# Patient Record
Sex: MALE | Race: WHITE | NOT HISPANIC OR LATINO | Employment: OTHER | URBAN - METROPOLITAN AREA
[De-identification: names, ages, dates, MRNs, and addresses within clinical notes are randomized per-mention and may not be internally consistent; named-entity substitution may affect disease eponyms.]

---

## 2017-02-06 ENCOUNTER — APPOINTMENT (OUTPATIENT)
Dept: RADIOLOGY | Facility: IMAGING CENTER | Age: 62
End: 2017-02-06
Attending: FAMILY MEDICINE
Payer: COMMERCIAL

## 2017-02-06 ENCOUNTER — OFFICE VISIT (OUTPATIENT)
Dept: URGENT CARE | Facility: PHYSICIAN GROUP | Age: 62
End: 2017-02-06
Payer: COMMERCIAL

## 2017-02-06 VITALS
HEART RATE: 114 BPM | TEMPERATURE: 98.4 F | WEIGHT: 315 LBS | OXYGEN SATURATION: 95 % | DIASTOLIC BLOOD PRESSURE: 76 MMHG | RESPIRATION RATE: 18 BRPM | HEIGHT: 71 IN | SYSTOLIC BLOOD PRESSURE: 128 MMHG | BODY MASS INDEX: 44.1 KG/M2

## 2017-02-06 DIAGNOSIS — J40 BRONCHITIS: ICD-10-CM

## 2017-02-06 DIAGNOSIS — R05.9 COUGH: ICD-10-CM

## 2017-02-06 DIAGNOSIS — R06.2 WHEEZE: ICD-10-CM

## 2017-02-06 DIAGNOSIS — J06.9 ACUTE URI: ICD-10-CM

## 2017-02-06 PROCEDURE — 99203 OFFICE O/P NEW LOW 30 MIN: CPT | Mod: 25 | Performed by: FAMILY MEDICINE

## 2017-02-06 PROCEDURE — 71020 DX-CHEST-2 VIEWS: CPT | Mod: 26 | Performed by: FAMILY MEDICINE

## 2017-02-06 PROCEDURE — 94760 N-INVAS EAR/PLS OXIMETRY 1: CPT | Performed by: FAMILY MEDICINE

## 2017-02-06 RX ORDER — DOXYCYCLINE HYCLATE 100 MG
100 TABLET ORAL 2 TIMES DAILY
Qty: 20 TAB | Refills: 0 | Status: SHIPPED | OUTPATIENT
Start: 2017-02-06 | End: 2017-03-16

## 2017-02-06 RX ORDER — PROMETHAZINE HYDROCHLORIDE AND CODEINE PHOSPHATE 6.25; 1 MG/5ML; MG/5ML
5 SYRUP ORAL 4 TIMES DAILY PRN
Qty: 120 ML | Refills: 0 | Status: SHIPPED | OUTPATIENT
Start: 2017-02-06 | End: 2017-03-16

## 2017-02-06 RX ORDER — ALBUTEROL SULFATE 90 UG/1
2 AEROSOL, METERED RESPIRATORY (INHALATION) EVERY 4 HOURS PRN
Qty: 1 INHALER | Refills: 0 | Status: SHIPPED | OUTPATIENT
Start: 2017-02-06 | End: 2019-06-06

## 2017-02-06 ASSESSMENT — ENCOUNTER SYMPTOMS
EYE REDNESS: 0
HEADACHES: 0
COUGH: 1
EYE DISCHARGE: 0
WEIGHT LOSS: 0
WEAKNESS: 1
MYALGIAS: 1

## 2017-02-06 NOTE — PROGRESS NOTES
"Subjective:      Darci Davis is a 61 y.o. male who presents with Cough            Cough  This is a new problem. Episode onset: 1 week waxing and waning productive cough without blood in sputum. Initial fever resolved. +SOB and \"gurgling\". No PMH asthma/pneumonia. +nasal congestion. +ST. OTC mucinex with min improvement.  Associated symptoms include myalgias. Pertinent negatives include no eye redness, headaches, rash or weight loss.   Sx's worse with exertion. No other aggravating or alleviating factors.      Review of Systems   Constitutional: Positive for malaise/fatigue. Negative for weight loss.   Eyes: Negative for discharge and redness.   Respiratory: Positive for cough.    Cardiovascular: Negative for leg swelling.   Musculoskeletal: Positive for myalgias. Negative for joint pain.   Skin: Negative for itching and rash.   Neurological: Positive for weakness. Negative for headaches.     .  Medications, Allergies, and current problem list reviewed today in Epic       Objective:     /76 mmHg  Pulse 114  Temp(Src) 36.9 °C (98.4 °F)  Resp 18  Ht 1.803 m (5' 10.98\")  Wt 147.419 kg (325 lb)  BMI 45.35 kg/m2  SpO2 95%     Physical Exam   Constitutional: He appears well-developed and well-nourished. No distress.   HENT:   Head: Normocephalic and atraumatic.   Nasal congestion  Pharynx red without exudate     Eyes: Conjunctivae are normal.   Neck: Normal range of motion. Neck supple.   Cardiovascular: Regular rhythm and normal heart sounds.    rapid   Pulmonary/Chest: Effort normal. He has wheezes (and rhonchi scattered). He has no rales.   Neurological:   Speech is clear. Patient is appropriate and cooperative.     Skin: Skin is warm and dry. No rash noted.               Assessment/Plan:     Cell: [phone]  CXR: no acute cardiopulmonary process by my read, radiology pending.    1. Acute URI     2. Bronchitis  doxycycline (VIBRAMYCIN) 100 MG Tab   3. Wheeze  DX-CHEST-2 VIEWS    albuterol 108 " (90 BASE) MCG/ACT Aero Soln inhalation aerosol   4. Cough  DX-CHEST-2 VIEWS    promethazine-codeine (PHENERGAN-CODEINE) 6.25-10 MG/5ML Syrup      Differential diagnosis, natural history, supportive care, and indications for immediate follow-up discussed at length.

## 2017-02-06 NOTE — Clinical Note
February 6, 2017         Patient: Darci Davis   YOB: 1955   Date of Visit: 2/6/2017           To Whom it May Concern:    Darci Davis was seen in my clinic on 2/6/2017. Please excuse 2/6 and 2/7/2017.       Sincerely,           Curt Akbar M.D.  Electronically Signed

## 2017-03-16 ENCOUNTER — OFFICE VISIT (OUTPATIENT)
Dept: MEDICAL GROUP | Facility: MEDICAL CENTER | Age: 62
End: 2017-03-16
Payer: COMMERCIAL

## 2017-03-16 VITALS
HEIGHT: 71 IN | BODY MASS INDEX: 44.1 KG/M2 | TEMPERATURE: 98.7 F | RESPIRATION RATE: 16 BRPM | OXYGEN SATURATION: 97 % | HEART RATE: 76 BPM | DIASTOLIC BLOOD PRESSURE: 82 MMHG | WEIGHT: 315 LBS | SYSTOLIC BLOOD PRESSURE: 128 MMHG

## 2017-03-16 DIAGNOSIS — E66.01 MORBID OBESITY, UNSPECIFIED OBESITY TYPE (HCC): Chronic | ICD-10-CM

## 2017-03-16 DIAGNOSIS — E55.9 HYPOVITAMINOSIS D: ICD-10-CM

## 2017-03-16 DIAGNOSIS — Z12.5 PROSTATE CANCER SCREENING: ICD-10-CM

## 2017-03-16 DIAGNOSIS — E78.5 DYSLIPIDEMIA: Chronic | ICD-10-CM

## 2017-03-16 DIAGNOSIS — G47.00 INSOMNIA, UNSPECIFIED TYPE: ICD-10-CM

## 2017-03-16 DIAGNOSIS — R73.01 IMPAIRED FASTING GLUCOSE: Chronic | ICD-10-CM

## 2017-03-16 DIAGNOSIS — Z00.00 PREVENTATIVE HEALTH CARE: ICD-10-CM

## 2017-03-16 DIAGNOSIS — Z12.11 COLON CANCER SCREENING: ICD-10-CM

## 2017-03-16 DIAGNOSIS — I10 ESSENTIAL HYPERTENSION: Chronic | ICD-10-CM

## 2017-03-16 PROCEDURE — 99213 OFFICE O/P EST LOW 20 MIN: CPT | Performed by: INTERNAL MEDICINE

## 2017-03-16 RX ORDER — TRAZODONE HYDROCHLORIDE 50 MG/1
50 TABLET ORAL NIGHTLY PRN
Qty: 30 TAB | Refills: 4 | Status: SHIPPED | OUTPATIENT
Start: 2017-03-16 | End: 2019-06-06

## 2017-03-16 NOTE — MR AVS SNAPSHOT
"        Darci Davis   3/16/2017 11:40 AM   Office Visit   MRN: [id number]    Department:  [facility]   Dept Phone:  [phone]    Description:  Male : 1955   Provider:  Phil Kohler M.D.           Allergies as of 3/16/2017     Allergen Noted Reactions    Ace Inhibitors 2009       Latex 2016   Rash    .    Pcn [Penicillins] 2010   Rash    .    Zocor [Simvastatin] 2010         You were diagnosed with     Morbid obesity, unspecified obesity type (CMS-HCC)   [4583520]       Essential hypertension   [5714797]       Dyslipidemia   [821001]       Impaired fasting glucose   [790.21.ICD-9-CM]       Prostate cancer screening   [445888]       Hypovitaminosis D   [703110]       Preventative health care   [984604]       Insomnia, unspecified type   [6651964]       Colon cancer screening   [090523]         Vital Signs     Blood Pressure Pulse Temperature Respirations Height Weight    128/82 mmHg 76 37.1 °C (98.7 °F) 16 1.803 m (5' 10.98\") 144.244 kg (318 lb)    Body Mass Index Oxygen Saturation Smoking Status             44.37 kg/m2 97% Former Smoker         Basic Information     Date Of Birth Sex Race Ethnicity Preferred Language    1955 Male White Non- English      Problem List              ICD-10-CM Priority Class Noted - Resolved    Hypertension (Chronic) I10   2009 - Present    Morbid obesity (CMS-HCC) (Chronic) E66.01   2009 - Present    Status post total left knee replacement Z96.652   2009 - Present    Preventative health care (Chronic) Z00.00   2009 - Present    Dyslipidemia (Chronic) E78.5   2009 - Present    Low back pain M54.5   12/10/2012 - Present    Steatosis of liver K76.0   2013 - Present    Renal cyst (Chronic) N28.1   2013 - Present    Impaired fasting glucose (Chronic) R73.01   10/10/2014 - Present    BPH (benign prostatic hypertrophy) (Chronic) N40.0   2015 - Present    Hypogonadism male (Chronic) E29.1   " 1/6/2016 - Present    Cervical pain M54.2   2/20/2016 - Present      Health Maintenance        Date Due Completion Dates    IMM INFLUENZA (1) 9/1/2016 12/29/2015, 10/9/2014, 11/26/2013, 12/6/2012, 10/14/2011    COLONOSCOPY 12/19/2022 12/19/2012 (Declined)    Override on 12/19/2012: Patient Declined (pt did not contact [facility])    IMM DTaP/Tdap/Td Vaccine (2 - Td) 5/28/2025 5/28/2015            Current Immunizations     Influenza TIV (IM) 11/26/2013, 12/6/2012, 10/14/2011    Influenza Vaccine Quad Inj (Pf) 12/29/2015  5:10 PM, 10/9/2014    SHINGLES VACCINE 10/9/2014    Tdap Vaccine 5/28/2015    Tetanus Vaccine 7/30/2010  2:40 PM      Below and/or attached are the medications your provider expects you to take. Review all of your home medications and newly ordered medications with your provider and/or pharmacist. Follow medication instructions as directed by your provider and/or pharmacist. Please keep your medication list with you and share with your provider. Update the information when medications are discontinued, doses are changed, or new medications (including over-the-counter products) are added; and carry medication information at all times in the event of emergency situations     Allergies:  ACE INHIBITORS - (reactions not documented)     LATEX - Rash     PCN - Rash     ZOCOR - (reactions not documented)               Medications  Valid as of: March 16, 2017 - 12:02 PM    Generic Name Brand Name Tablet Size Instructions for use    Albuterol Sulfate (Aero Soln) albuterol 108 (90 BASE) MCG/ACT Inhale 2 Puffs by mouth every four hours as needed for Shortness of Breath.        Cholecalciferol (Cap) Vitamin D 2000 UNITS Take  by mouth every day.        Naproxen (Tab) NAPROSYN 500 MG Take 1 Tab by mouth 2 times a day as needed (pain).        Pravastatin Sodium (Tab) PRAVACHOL 20 MG Take 1 Tab by mouth every day.        TraZODone HCl (Tab) DESYREL 50 MG Take 1 Tab by mouth at bedtime as needed for Sleep.         Triamcinolone Acetonide (Cream) KENALOG 0.1 % Apply affected area bid prn itching        Valsartan-Hydrochlorothiazide (Tab) DIOVAN--25 MG Take 1 Tab by mouth every day.        .                 Medicines prescribed today were sent to:     [facility] [address] [place] [address] [place], [place] - [address] AT [facility] OF [facility] & [place]    [address] [place] [address]    Phone: [phone] Fax: [phone]    Open 24 Hours?: No      Medication refill instructions:       If your prescription bottle indicates you have medication refills left, it is not necessary to call your provider’s office. Please contact your pharmacy and they will refill your medication.    If your prescription bottle indicates you do not have any refills left, you may request refills at any time through one of the following ways: The online [employer]Link system (except Urgent Care), by calling your provider’s office, or by asking your pharmacy to contact your provider’s office with a refill request. Medication refills are processed only during regular business hours and may not be available until the next business day. Your provider may request additional information or to have a follow-up visit with you prior to refilling your medication.   *Please Note: Medication refills are assigned a new Rx number when refilled electronically. Your pharmacy may indicate that no refills were authorized even though a new prescription for the same medication is available at the pharmacy. Please request the medicine by name with the pharmacy before contacting your provider for a refill.        Your To Do List     Future Labs/Procedures Complete By Expires    CBC WITH DIFFERENTIAL  As directed 3/17/2018    COMP METABOLIC PANEL  As directed 3/17/2018    LIPID PROFILE  As directed 3/17/2018    MICROALBUMIN CREAT RATIO URINE  As directed 3/17/2018    PROSTATE SPECIFIC AG SCREENING  As directed 3/17/2018    TSH  As directed 3/17/2018    URINALYSIS,CULTURE IF INDICATED   As directed 3/17/2018    VITAMIN D,25 HYDROXY  As directed 3/17/2018      Referral     A referral request has been sent to our patient care coordination department. Please allow 3-5 business days for us to process this request and contact you either by phone or mail. If you do not hear from us by the 5th business day, please call us at [phone].        Other Notes About Your Plan     Need follow up MRI kidney ordered renal cyst                 [employer] Access Code: Activation code not generated  Current [employer] Status: Active

## 2017-03-16 NOTE — PROGRESS NOTES
Subjective:      Darci Davis is a 61 y.o. male who presents with htn        HPI     Blood pressure running 120-130/70 on diovan hct 160/25 no lightheadedness or dizziness, has been exercising more since left knee arthroplasty last year, able to ride a stationary bike twice daily 15 minutes at a time.  No associated chest pain or shortness of breath with activity.  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  No tobacco, no alcohol.  No caffeine during the day, no sodas, diet sodas.  Trying to drink water.  Still trying to eat healthy, limiting sweets and candies, low-carb diet currently.  Insurance not cover bariatric surgery.  Last month developed a cold, symptoms have improved, no sore throat, no cough, no shortness of breath, no fevers or chills, no antibiotics, no asthma COPD        Current Outpatient Prescriptions   Medication Sig Dispense Refill   • doxycycline (VIBRAMYCIN) 100 MG Tab Take 1 Tab by mouth 2 times a day. 20 Tab 0   • promethazine-codeine (PHENERGAN-CODEINE) 6.25-10 MG/5ML Syrup Take 5 mL by mouth 4 times a day as needed for Cough. 120 mL 0   • albuterol 108 (90 BASE) MCG/ACT Aero Soln inhalation aerosol Inhale 2 Puffs by mouth every four hours as needed for Shortness of Breath. 1 Inhaler 0   • Cholecalciferol (VITAMIN D) 2000 UNITS Cap Take  by mouth every day.     • valsartan-hydrochlorothiazide (DIOVAN-HCT) 160-25 MG per tablet Take 1 Tab by mouth every day. 90 Tab 2   • pravastatin (PRAVACHOL) 20 MG Tab Take 1 Tab by mouth every day. 90 Tab 3   • triamcinolone acetonide (KENALOG) 0.1 % CREA Apply affected area bid prn itching 45 g 4   • naproxen (NAPROSYN) 500 MG TABS Take 1 Tab by mouth 2 times a day as needed (pain). 60 Tab 6     No current facility-administered medications for this visit.     Patient Active Problem List    Diagnosis Date  Noted   • Cervical pain 02/20/2016   • Hypogonadism male 01/06/2016   • BPH (benign prostatic hypertrophy) 05/28/2015   • Impaired fasting glucose 10/10/2014   • Steatosis of liver 12/09/2013   • Renal cyst 12/09/2013   • Low back pain 12/10/2012   • Hypertension 05/04/2009   • Morbid obesity (CMS-HCC) 05/04/2009   • Status post total left knee replacement 05/04/2009   • Preventative health care 05/04/2009   • Dyslipidemia 05/04/2009       Steatosis liver  12/9/13 AST 19,ALT 15  12/9/13 ultrasound abdomen echogenic inhomogenous liver nonspecific finding often found to represent steatosis  10/10/14 AST 17,ALT 15  6/3/15 AST 17,ALT 18  1/4/16 AST 14,ALT 19  2/12/16 AST 27,ALT 30  6/21/16 AST 14,ALT 16    Status post total knee replacement  6/08  right meniscus repair  5/11 MRI left knee medial meniscus tear, moderate chondral thinning, chondromalacia  6/11  ortho left knee medial and lateral meniscectomy  7/12  left knee injection  12/12 now sees  ortho  1/13  ortho note, injection left knee  5/21/13  ortho note, injection left knee  3/31/15  ortho note; left knee injection steroid  5/29/15  ortho note knee arthritis,second injection euflexxa left knee  7/11/16  orthopedic operative note, left total knee arthroplasty  1/31/17  orthopedic note 6 months status post left total knee, x-ray left knee 3 view good component position and alignment, follow-up 6 months repeat x-rays    Renal cyst  12/9/13 ultrasound abdomen left renal 18 mm hypoechoic mass most likely cysts, MRI or CT to clarify  12/13/13 CT abd/pelvis with and without; 8 mm simple appearing cyst in the lower pole the left kidney which is discordant in size with the recent ultrasound. Followup ultrasound surveillance or MRI examination may be of value to further study the left kidney,11 mm in diameter simple cyst in the midpole region of the right kidney  11/17/14 ultrasound  renal kidneys are poorly visualized due to poor sonographic penetration of the current exam,no focal renal cyst or mass is identified in the left kidney as seen on prior imaging studies at this time    Preventative health  10/9/14 zoster vaccine  5/28/15 colon cancer screening made  5/28/15 tdap  1/6/16 psa 2.0  6/21/16 vit d 38    Morbid obesity  7/2/13 declines bariatric eval  7/12/13 apnea link AHI 2, desaturation <89% for 27 min, total sleep time 6 hours 34 min  12/2/13  referral bariatric surgery  10/9/14 BMI 44.3; referral to  weight loss bariatric  5/28/15 BMI 46.3 has seen  and declines bariatric surgery, trial orlistat  12/29/15 BMI 46.3    2/4/16 patient called, insurance does not cover bariatric surgery  6/16/16 BMI 45.2    Impaired fasting blood sugar  10/10/14 bs 114  6/3/15 A1c 5.9%,bs 94  1/6/16 bs 104,A1c 6.1%  6/21/16 A1c 5.8%    Hypertension  9/08 urine mac 9, intolerant ace cough  Was on diovan hct 320/12.5 caused lightheadedness, taking half pill a day  10/14/11 on diovan 320/12.5 mg  11/3/11 change to diovan 320 mg, combo dose caused fatigue  12/6/12 change to diovan 160/25 mg daily  12/12 urine mac 4 on diovan hct 160/25 mg  12/2/13 urine mac <0.5 on diovan hct 160/25 mg  10/10/14 urine mac 6 on diovan 160/25 mg  6/3/15 urine mac <0.5 on diovan 160/25 mg  1/6/16 urine mac <0.5 on diovan hct 160/25 mg    Hypogonadism  10/10/14 testosterone 201,free testosterone 49  1/6/16 testosterone 150,free testosterone 28  2/12/16 testosterone 179,prolactin 7,FSH 2.5,LH 1.0,SHBG 37  2/16/16 will try androgel 20.25 mg apply two per day  6/16/16 off androgel    Dyslipidemia  9/08 chol 213, trig 119, hdl 49, ldl 140  5/09 chol 171,trig 118,hdl 51,ldl 96  9/10 hold pravachol  3/11 chol 247,trig 143,hdl 51,ldl 167   3/11 start lipitor samples  5/11 need to try crestor per insurance, trial of crestor 10 mg  10/14/11 on lipitor 20 mg insurance cleared  10/24/11 chol 203,trig 102,hdl 52,ldl  131 off med; start lipitor 20 mg  12/12 chol 167,trig 114,hdl 53,ldl 91 on lipitor 20 mg  12/2/13 chol 199,trig 167,hdl 52,ldl 114 on lipitor 20 mg  10/10/14 chol 198,trig 140,hdl 55,ldl 115 on lipitor 20 mg  6/3/15 chol 162,trig 159,hdl 50,ldl 80 on lipitor 20 mg  12/29/15 off lipitor  1/6/16 chol 233,trig 147,hdl 52,ldl 142 off lipitor  1/7/16 start pravachol 20 mg  2/12/16 chol 190,trig 134,hdl 46,ldl 117 on pravachol 20 mg  6/21/16 chol 184,trig 131,hdl 54,ldl 104 on pravachol 20 mg    cervical pain  2/11/16 spine [place] note evaluation cervical pain, x-rays cervical spine from february 2016 showed lordosis C4-C6 and DJD, slight C4-C5 spondylolisthesis, will obtain cervical flexion and extension x-rays, continue naprosyn and robaxin as needed, start physical therapy, follow-up 6 weeks if no improvement consider MRI    BPH  5/28/15 start Flomax              ROS       Objective:         Physical Exam   Constitutional: He appears well-developed and well-nourished. No distress.   HENT:   Head: Normocephalic and atraumatic.   Eyes: Right eye exhibits no discharge. Left eye exhibits no discharge. No scleral icterus.   Neck: Neck supple.   Cardiovascular: Normal rate and regular rhythm.    Pulmonary/Chest: Effort normal and breath sounds normal. No respiratory distress.   Abdominal: Soft.   Neurological: He is alert.   Skin: Skin is warm. He is not diaphoretic.   Psychiatric: He has a normal mood and affect. His behavior is normal.   Nursing note and vitals reviewed.              Assessment/Plan:     Assessment  #! htn on diovan hct stable and controlled    #2 BMI 44.38 not covered by insurance for bariatric surgery, declines nutrition consultation, riding bike 10-15 minutes twice per day    #3 dyslipidemia on pravachol with no muscle pain or aches     #4 insomnia secondary to stress, no anxiety or depression    #5 resolving bronchitis, chest x-ray last month negative, normal breath sounds, normal oxygenation, afebrile        Plan  #1 sleep hygiene discussed, no caffeine, no alcohol, recommend try meditation therapy, has been trying over-the-counter medication with some benefit, no anxiety or depression concurrently    #2 recommend trazodone 50 mg every evening, can cause dry eyes, dry mouth, constipation    #3 labs    #4 [facility] referral placed again colon cancer screening    #5 declines nutrition consultation, unable to refer to bariatric not covered by insurance, importance of weight loss discussed with regards to preventing diabetes, cardiovascular disease, will continue low carbohydrate diet, exercising at least 30 minutes a day, he has lost weight compared to one month ago    #6 follow-up 6 months

## 2017-04-06 ENCOUNTER — TELEPHONE (OUTPATIENT)
Dept: MEDICAL GROUP | Facility: MEDICAL CENTER | Age: 62
End: 2017-04-06

## 2017-04-06 ENCOUNTER — HOSPITAL ENCOUNTER (OUTPATIENT)
Dept: LAB | Facility: MEDICAL CENTER | Age: 62
End: 2017-04-06
Attending: INTERNAL MEDICINE
Payer: COMMERCIAL

## 2017-04-06 DIAGNOSIS — Z00.00 PREVENTATIVE HEALTH CARE: ICD-10-CM

## 2017-04-06 LAB
25(OH)D3 SERPL-MCNC: 32 NG/ML (ref 30–100)
ALBUMIN SERPL BCP-MCNC: 4 G/DL (ref 3.2–4.9)
ALBUMIN/GLOB SERPL: 1.2 G/DL
ALP SERPL-CCNC: 57 U/L (ref 30–99)
ALT SERPL-CCNC: 14 U/L (ref 2–50)
ANION GAP SERPL CALC-SCNC: 9 MMOL/L (ref 0–11.9)
APPEARANCE UR: CLEAR
AST SERPL-CCNC: 16 U/L (ref 12–45)
BASOPHILS # BLD AUTO: 0.9 % (ref 0–1.8)
BASOPHILS # BLD: 0.09 K/UL (ref 0–0.12)
BILIRUB SERPL-MCNC: 0.5 MG/DL (ref 0.1–1.5)
BILIRUB UR QL STRIP.AUTO: NEGATIVE
BUN SERPL-MCNC: 19 MG/DL (ref 8–22)
CALCIUM SERPL-MCNC: 9.6 MG/DL (ref 8.5–10.5)
CHLORIDE SERPL-SCNC: 102 MMOL/L (ref 96–112)
CHOLEST SERPL-MCNC: 190 MG/DL (ref 100–199)
CO2 SERPL-SCNC: 26 MMOL/L (ref 20–33)
COLOR UR: NORMAL
CREAT SERPL-MCNC: 0.93 MG/DL (ref 0.5–1.4)
CREAT UR-MCNC: 69.7 MG/DL
CULTURE IF INDICATED INDCX: NO UA CULTURE
EOSINOPHIL # BLD AUTO: 0.25 K/UL (ref 0–0.51)
EOSINOPHIL NFR BLD: 2.6 % (ref 0–6.9)
ERYTHROCYTE [DISTWIDTH] IN BLOOD BY AUTOMATED COUNT: 45.3 FL (ref 35.9–50)
GFR SERPL CREATININE-BSD FRML MDRD: >60 ML/MIN/1.73 M 2
GLOBULIN SER CALC-MCNC: 3.4 G/DL (ref 1.9–3.5)
GLUCOSE SERPL-MCNC: 104 MG/DL (ref 65–99)
GLUCOSE UR STRIP.AUTO-MCNC: NEGATIVE MG/DL
HCT VFR BLD AUTO: 51 % (ref 42–52)
HDLC SERPL-MCNC: 51 MG/DL
HGB BLD-MCNC: 16.7 G/DL (ref 14–18)
IMM GRANULOCYTES # BLD AUTO: 0.04 K/UL (ref 0–0.11)
IMM GRANULOCYTES NFR BLD AUTO: 0.4 % (ref 0–0.9)
KETONES UR STRIP.AUTO-MCNC: NEGATIVE MG/DL
LDLC SERPL CALC-MCNC: 110 MG/DL
LEUKOCYTE ESTERASE UR QL STRIP.AUTO: NEGATIVE
LYMPHOCYTES # BLD AUTO: 2.12 K/UL (ref 1–4.8)
LYMPHOCYTES NFR BLD: 22.2 % (ref 22–41)
MCH RBC QN AUTO: 31.3 PG (ref 27–33)
MCHC RBC AUTO-ENTMCNC: 32.7 G/DL (ref 33.7–35.3)
MCV RBC AUTO: 95.7 FL (ref 81.4–97.8)
MICRO URNS: NORMAL
MICROALBUMIN UR-MCNC: <0.7 MG/DL
MICROALBUMIN/CREAT UR: NORMAL MG/G (ref 0–30)
MONOCYTES # BLD AUTO: 0.81 K/UL (ref 0–0.85)
MONOCYTES NFR BLD AUTO: 8.5 % (ref 0–13.4)
NEUTROPHILS # BLD AUTO: 6.26 K/UL (ref 1.82–7.42)
NEUTROPHILS NFR BLD: 65.4 % (ref 44–72)
NITRITE UR QL STRIP.AUTO: NEGATIVE
NRBC # BLD AUTO: 0 K/UL
NRBC BLD AUTO-RTO: 0 /100 WBC
PH UR STRIP.AUTO: 7 [PH]
PLATELET # BLD AUTO: 284 K/UL (ref 164–446)
PMV BLD AUTO: 10.9 FL (ref 9–12.9)
POTASSIUM SERPL-SCNC: 4.3 MMOL/L (ref 3.6–5.5)
PROT SERPL-MCNC: 7.4 G/DL (ref 6–8.2)
PROT UR QL STRIP: NEGATIVE MG/DL
PSA SERPL-MCNC: 2.85 NG/ML (ref 0–4)
RBC # BLD AUTO: 5.33 M/UL (ref 4.7–6.1)
RBC UR QL AUTO: NEGATIVE
SODIUM SERPL-SCNC: 137 MMOL/L (ref 135–145)
SP GR UR STRIP.AUTO: 1.01
TRIGL SERPL-MCNC: 147 MG/DL (ref 0–149)
TSH SERPL DL<=0.005 MIU/L-ACNC: 2.04 UIU/ML (ref 0.3–3.7)
WBC # BLD AUTO: 9.6 K/UL (ref 4.8–10.8)

## 2017-04-06 PROCEDURE — 36415 COLL VENOUS BLD VENIPUNCTURE: CPT

## 2017-04-06 PROCEDURE — 80061 LIPID PANEL: CPT

## 2017-04-06 PROCEDURE — 82570 ASSAY OF URINE CREATININE: CPT

## 2017-04-06 PROCEDURE — 84153 ASSAY OF PSA TOTAL: CPT

## 2017-04-06 PROCEDURE — 85025 COMPLETE CBC W/AUTO DIFF WBC: CPT

## 2017-04-06 PROCEDURE — 82043 UR ALBUMIN QUANTITATIVE: CPT

## 2017-04-06 PROCEDURE — 82306 VITAMIN D 25 HYDROXY: CPT

## 2017-04-06 PROCEDURE — 81003 URINALYSIS AUTO W/O SCOPE: CPT

## 2017-04-06 PROCEDURE — 84443 ASSAY THYROID STIM HORMONE: CPT

## 2017-04-06 PROCEDURE — 80053 COMPREHEN METABOLIC PANEL: CPT

## 2017-04-07 ENCOUNTER — TELEPHONE (OUTPATIENT)
Dept: MEDICAL GROUP | Facility: MEDICAL CENTER | Age: 62
End: 2017-04-07

## 2017-04-07 NOTE — TELEPHONE ENCOUNTER
----- Message from Phil Kohler M.D. sent at 4/6/2017  9:08 PM PDT -----  Called the patient and left a message  Please let him know that his blood tests show:  (1) his cholesterol is still good at 190, good cholesterol is 51 (goal is above 40), bad cholesterol 110 (goal is 100 or less)  (2) his liver function, kidney function, thyroid tests and vitamin D levels are normal  (3) his prostate cancer blood test is normal  (4) his blood sugar is slightly high at 104, normal blood sugar is less than 100  (5) have him continue to work in his diet and exercise

## 2017-04-07 NOTE — TELEPHONE ENCOUNTER
Called the patient and left a message  Please let him know that his blood tests show:  (1) his cholesterol is still good at 190, good cholesterol is 51 (goal is above 40), bad cholesterol 110 (goal is 100 or less)  (2) his liver function, kidney function, thyroid tests and vitamin D levels are normal  (3) his prostate cancer blood test is normal  (4) his blood sugar is slightly high at 104, normal blood sugar is less than 100  (5) have him continue to work in his diet and exercise

## 2017-12-26 ENCOUNTER — OFFICE VISIT (OUTPATIENT)
Dept: MEDICAL GROUP | Facility: MEDICAL CENTER | Age: 62
End: 2017-12-26
Payer: COMMERCIAL

## 2017-12-26 VITALS
SYSTOLIC BLOOD PRESSURE: 134 MMHG | DIASTOLIC BLOOD PRESSURE: 80 MMHG | BODY MASS INDEX: 44.1 KG/M2 | TEMPERATURE: 98 F | OXYGEN SATURATION: 95 % | HEIGHT: 71 IN | WEIGHT: 315 LBS | HEART RATE: 103 BPM

## 2017-12-26 DIAGNOSIS — R73.01 IMPAIRED FASTING GLUCOSE: Chronic | ICD-10-CM

## 2017-12-26 DIAGNOSIS — R19.7 DIARRHEA, UNSPECIFIED TYPE: ICD-10-CM

## 2017-12-26 DIAGNOSIS — E66.01 MORBID OBESITY (HCC): Chronic | ICD-10-CM

## 2017-12-26 DIAGNOSIS — I10 ESSENTIAL HYPERTENSION: Chronic | ICD-10-CM

## 2017-12-26 DIAGNOSIS — E78.5 DYSLIPIDEMIA: Chronic | ICD-10-CM

## 2017-12-26 PROCEDURE — 99214 OFFICE O/P EST MOD 30 MIN: CPT | Performed by: INTERNAL MEDICINE

## 2017-12-26 ASSESSMENT — PATIENT HEALTH QUESTIONNAIRE - PHQ9: CLINICAL INTERPRETATION OF PHQ2 SCORE: 0

## 2017-12-26 NOTE — PROGRESS NOTES
Subjective:      Darci Davis is a 62 y.o. male who presents with GI Problem (diarrhea, stomach pain)            HPI   New complaint  Has had loose stools since thanksgiving, no one else got symptoms, initially was watery and then stools improved but still with looser stools at times can have regularly formed stools at times, will have looser stools a few times per week, usually occurs in am with gas, no blood in stool, no abdominal pain or cramping, no fevers or chills, no nausea or emesis. Solid foods are ok but veggies seem to precipitate the symptoms   No regular NSAIDs , no history of peptic ulcer disease  Has used peptol bismol on a regular basis because of the bowel changes  No food allergies  No diet changes  No new meds or supplements   No recent travel  No recent antibiotics  Blood pressure running 120/70 on diovan hct, no lightheadedness or dizziness  Dyslipidemia on pravastatin no muscle pain or muscle ache with statin therapy  Still keeps active around house         Current Outpatient Prescriptions   Medication Sig Dispense Refill   • pravastatin (PRAVACHOL) 20 MG Tab Take 1 Tab by mouth every day. 90 Tab 3   • valsartan-hydrochlorothiazide (DIOVAN-HCT) 160-25 MG per tablet Take 1 Tab by mouth every day. 90 Tab 3   • trazodone (DESYREL) 50 MG Tab Take 1 Tab by mouth at bedtime as needed for Sleep. 30 Tab 4   • albuterol 108 (90 BASE) MCG/ACT Aero Soln inhalation aerosol Inhale 2 Puffs by mouth every four hours as needed for Shortness of Breath. 1 Inhaler 0   • Cholecalciferol (VITAMIN D) 2000 UNITS Cap Take  by mouth every day.     • triamcinolone acetonide (KENALOG) 0.1 % CREA Apply affected area bid prn itching 45 g 4   • naproxen (NAPROSYN) 500 MG TABS Take 1 Tab by mouth 2 times a day as needed (pain). 60 Tab 6     No current facility-administered medications for this visit.      Patient Active Problem List   Diagnosis   • Hypertension   • Morbid obesity (CMS-HCC)   • Status post total left  knee replacement   • Preventative health care   • Dyslipidemia   • Low back pain   • Steatosis of liver   • Renal cyst   • Impaired fasting glucose   • BPH (benign prostatic hypertrophy)   • Hypogonadism male   • Cervical pain     Depression Screening    Little interest or pleasure in doing things?  0 - not at all  Feeling down, depressed , or hopeless? 0 - not at all  Patient Health Questionnaire Score: 0         Steatosis liver  12/9/13 AST 19,ALT 15  12/9/13 ultrasound abdomen echogenic inhomogenous liver nonspecific finding often found to represent steatosis  10/10/14 AST 17,ALT 15  6/3/15 AST 17,ALT 18  1/4/16 AST 14,ALT 19  2/12/16 AST 27,ALT 30  6/21/16 AST 14,ALT 16  4/6/17 AST 16,ALT 14     Status post total knee replacement  6/08  right meniscus repair  5/11 MRI left knee medial meniscus tear, moderate chondral thinning, chondromalacia  6/11  ortho left knee medial and lateral meniscectomy  7/12  left knee injection  12/12 now sees  ortho  1/13  ortho note, injection left knee  5/21/13  ortho note, injection left knee  3/31/15  ortho note; left knee injection steroid  5/29/15  ortho note knee arthritis,second injection euflexxa left knee  7/11/16  orthopedic operative note, left total knee arthroplasty  1/31/17  orthopedic note 6 months status post left total knee, x-ray left knee 3 view good component position and alignment, follow-up 6 months repeat x-rays     Renal cyst  12/9/13 ultrasound abdomen left renal 18 mm hypoechoic mass most likely cysts, MRI or CT to clarify  12/13/13 CT abd/pelvis with and without; 8 mm simple appearing cyst in the lower pole the left kidney which is discordant in size with the recent ultrasound. Followup ultrasound surveillance or MRI examination may be of value to further study the left kidney,11 mm in diameter simple cyst in the midpole region of the right kidney  11/17/14 ultrasound  renal kidneys are poorly visualized due to poor sonographic penetration of the current exam,no focal renal cyst or mass is identified in the left kidney as seen on prior imaging studies at this time     Preventative health  10/9/14 zoster vaccine  5/28/15 tdap  3/16/17 [facility] referral placed again colon cancer screening  4/6/17 vit d 32  4/6/17 psa 2.8     Morbid obesity  7/2/13 declines bariatric eval  7/12/13 apnea link AHI 2, desaturation <89% for 27 min, total sleep time 6 hours 34 min  12/2/13  referral bariatric surgery  10/9/14 BMI 44.3; referral to  weight loss bariatric  5/28/15 BMI 46.3 has seen  and declines bariatric surgery, trial orlistat  12/29/15 BMI 46.3   2/4/16 patient called, insurance does not cover bariatric surgery  6/16/16 BMI 45.2  9/16/16 BMI 42.8   3/6/17 BMI 44.3 insurance not cover bariatric, declines nutrition consultation; 20-30 minutes bike per day    low back pain  12/12 x-ray hip bilateral mild DJD  12/12 x-ray lumbar spine multilevel DJD and arthropathy, mild to moderate dextroconvex scoliosis    Impaired fasting blood sugar  10/10/14 bs 114  6/3/15 A1c 5.9%,bs 94  1/6/16 bs 104,A1c 6.1%  6/21/16 A1c 5.8%  4/6/17 bs 104     Hypertension  9/08 urine mac 9, intolerant ace cough  Was on diovan hct 320/12.5 caused lightheadedness, taking half pill a day  10/14/11 on diovan 320/12.5 mg  11/3/11 change to diovan 320 mg, combo dose caused fatigue  12/6/12 change to diovan 160/25 mg daily  12/12 urine mac 4 on diovan hct 160/25 mg  12/2/13 urine mac <0.5 on diovan hct 160/25 mg  10/10/14 urine mac 6 on diovan 160/25 mg  6/3/15 urine mac <0.5 on diovan 160/25 mg  1/6/16 urine mac <0.5 on diovan hct 160/25 mg  4/6/17 urine mac <0.7 on diovan hct 160/25 mg     Hypogonadism  10/10/14 testosterone 201,free testosterone 49  1/6/16 testosterone 150,free testosterone 28  2/12/16 testosterone 179,prolactin 7,FSH 2.5,LH 1.0,SHBG 37  2/16/16 will try androgel 20.25 mg apply two  "per day  6/16/16 off androgel     Dyslipidemia  9/08 chol 213, trig 119, hdl 49, ldl 140  5/09 chol 171,trig 118,hdl 51,ldl 96  9/10 hold pravachol  3/11 chol 247,trig 143,hdl 51,ldl 167   3/11 start lipitor samples  5/11 need to try crestor per insurance, trial of crestor 10 mg  10/14/11 on lipitor 20 mg insurance cleared  10/24/11 chol 203,trig 102,hdl 52,ldl 131 off med; start lipitor 20 mg  12/12 chol 167,trig 114,hdl 53,ldl 91 on lipitor 20 mg  12/2/13 chol 199,trig 167,hdl 52,ldl 114 on lipitor 20 mg  10/10/14 chol 198,trig 140,hdl 55,ldl 115 on lipitor 20 mg  6/3/15 chol 162,trig 159,hdl 50,ldl 80 on lipitor 20 mg  12/29/15 off lipitor  1/6/16 chol 233,trig 147,hdl 52,ldl 142 off lipitor  1/7/16 start pravachol 20 mg  2/12/16 chol 190,trig 134,hdl 46,ldl 117 on pravachol 20 mg  6/21/16 chol 184,trig 131,hdl 54,ldl 104 on pravachol 20 mg  4/6/17 chol 190,trig 147,hdl 51,ldl 110 on pravachol 20 mg  (lipitor cause muscle pain)     cervical pain  2/11/16 [place] note evaluation cervical pain, x-rays cervical spine from february 2016 showed lordosis C4-C6 and DJD, slight C4-C5 spondylolisthesis, will obtain cervical flexion and extension x-rays, continue naprosyn and robaxin as needed, start physical therapy, follow-up 6 weeks if no improvement consider MRI     BPH  5/28/15 start Flomax  12/29/15 start cialis 5 mg daily   4/6/17 psa 2.8          Health Maintenance Summary                IMM INFLUENZA Overdue 9/1/2017      Done 12/29/2015 Imm Admin: Influenza Vaccine Quad Inj (Pf)     Patient has more history with this topic...    COLONOSCOPY Next Due 12/19/2022      Patient Declined 12/19/2012 pt did not contact [facility]    IMM DTaP/Tdap/Td Vaccine Next Due 5/28/2025      Done 5/28/2015 Imm Admin: Tdap Vaccine          Patient Care Team:  Philshy Kohler M.D. as PCP - General    ROS       Objective:     /80   Pulse (!) 103   Temp 36.7 °C (98 °F)   Ht 1.803 m (5' 11\")   Wt (!) 147.4 kg (325 lb)   SpO2 " 95%   BMI 45.33 kg/m²      Physical Exam   Constitutional: He appears well-developed and well-nourished. No distress.   HENT:   Head: Normocephalic and atraumatic.   Mouth/Throat: Oropharynx is clear and moist.   Eyes: Conjunctivae are normal. Right eye exhibits no discharge. Left eye exhibits no discharge.   Neck: Neck supple. No JVD present. No thyromegaly present.   Cardiovascular: Normal rate, regular rhythm and normal heart sounds.    Pulmonary/Chest: Effort normal and breath sounds normal. No respiratory distress. He has no wheezes.   Abdominal: Soft. He exhibits no distension and no mass. There is no tenderness. There is no guarding.   Musculoskeletal: He exhibits no edema.   Neurological: He is alert.   Skin: Skin is warm. He is not diaphoretic.   Psychiatric: He has a normal mood and affect. His behavior is normal.   Nursing note and vitals reviewed.              Assessment/Plan:     Assessment  #! Loose stools, off and on for last few months, which are related to Pepto-Bismol, no melena nor hematochezia, consider IBS, bacterial overgrowth, no recent antibiotics, no recent travel, no abdominal pain, no evidence of obstruction, no fevers or chills    #2 hypertension stable and controlled on Diovan HCT    #3 obesity BMI 45.3    #4 dyslipidemia on pravastatin    #5 impaired fasting blood sugar      Plan  #1 stool studies    #2 labs    #3 discontinue Pepto-Bismol which may be contributing to his symptoms    #4 continue check blood pressure and record    #5 importance of diet, exercise, weight loss discussed, has declined nutrition consultation previously, exercises not cover bariatric treatment    #6 notify us if worsening symptoms    #7 Follow up 3 months, sooner if symptoms persist

## 2017-12-27 ENCOUNTER — HOSPITAL ENCOUNTER (OUTPATIENT)
Dept: LAB | Facility: MEDICAL CENTER | Age: 62
End: 2017-12-27
Attending: INTERNAL MEDICINE
Payer: COMMERCIAL

## 2017-12-27 DIAGNOSIS — E78.5 DYSLIPIDEMIA: Chronic | ICD-10-CM

## 2017-12-27 DIAGNOSIS — R73.01 IMPAIRED FASTING GLUCOSE: Chronic | ICD-10-CM

## 2017-12-27 DIAGNOSIS — R19.7 DIARRHEA, UNSPECIFIED TYPE: ICD-10-CM

## 2017-12-27 LAB
ALBUMIN SERPL BCP-MCNC: 4.2 G/DL (ref 3.2–4.9)
ALBUMIN/GLOB SERPL: 1.3 G/DL
ALP SERPL-CCNC: 43 U/L (ref 30–99)
ALT SERPL-CCNC: 20 U/L (ref 2–50)
ANION GAP SERPL CALC-SCNC: 10 MMOL/L (ref 0–11.9)
AST SERPL-CCNC: 23 U/L (ref 12–45)
BASOPHILS # BLD AUTO: 1.1 % (ref 0–1.8)
BASOPHILS # BLD: 0.11 K/UL (ref 0–0.12)
BILIRUB SERPL-MCNC: 0.6 MG/DL (ref 0.1–1.5)
BUN SERPL-MCNC: 16 MG/DL (ref 8–22)
CALCIUM SERPL-MCNC: 10.1 MG/DL (ref 8.5–10.5)
CHLORIDE SERPL-SCNC: 102 MMOL/L (ref 96–112)
CHOLEST SERPL-MCNC: 215 MG/DL (ref 100–199)
CO2 SERPL-SCNC: 26 MMOL/L (ref 20–33)
CREAT SERPL-MCNC: 0.92 MG/DL (ref 0.5–1.4)
EOSINOPHIL # BLD AUTO: 0.42 K/UL (ref 0–0.51)
EOSINOPHIL NFR BLD: 4.2 % (ref 0–6.9)
ERYTHROCYTE [DISTWIDTH] IN BLOOD BY AUTOMATED COUNT: 47.1 FL (ref 35.9–50)
EST. AVERAGE GLUCOSE BLD GHB EST-MCNC: 120 MG/DL
GFR SERPL CREATININE-BSD FRML MDRD: >60 ML/MIN/1.73 M 2
GLOBULIN SER CALC-MCNC: 3.3 G/DL (ref 1.9–3.5)
GLUCOSE SERPL-MCNC: 98 MG/DL (ref 65–99)
HBA1C MFR BLD: 5.8 % (ref 0–5.6)
HCT VFR BLD AUTO: 49.9 % (ref 42–52)
HDLC SERPL-MCNC: 53 MG/DL
HGB BLD-MCNC: 16.5 G/DL (ref 14–18)
IMM GRANULOCYTES # BLD AUTO: 0.03 K/UL (ref 0–0.11)
IMM GRANULOCYTES NFR BLD AUTO: 0.3 % (ref 0–0.9)
LDLC SERPL CALC-MCNC: 122 MG/DL
LYMPHOCYTES # BLD AUTO: 2.71 K/UL (ref 1–4.8)
LYMPHOCYTES NFR BLD: 27 % (ref 22–41)
MCH RBC QN AUTO: 32.2 PG (ref 27–33)
MCHC RBC AUTO-ENTMCNC: 33.1 G/DL (ref 33.7–35.3)
MCV RBC AUTO: 97.3 FL (ref 81.4–97.8)
MONOCYTES # BLD AUTO: 0.8 K/UL (ref 0–0.85)
MONOCYTES NFR BLD AUTO: 8 % (ref 0–13.4)
NEUTROPHILS # BLD AUTO: 5.97 K/UL (ref 1.82–7.42)
NEUTROPHILS NFR BLD: 59.4 % (ref 44–72)
NRBC # BLD AUTO: 0 K/UL
NRBC BLD-RTO: 0 /100 WBC
PLATELET # BLD AUTO: 285 K/UL (ref 164–446)
PMV BLD AUTO: 10.9 FL (ref 9–12.9)
POTASSIUM SERPL-SCNC: 4.4 MMOL/L (ref 3.6–5.5)
PROT SERPL-MCNC: 7.5 G/DL (ref 6–8.2)
RBC # BLD AUTO: 5.13 M/UL (ref 4.7–6.1)
SODIUM SERPL-SCNC: 138 MMOL/L (ref 135–145)
TRIGL SERPL-MCNC: 201 MG/DL (ref 0–149)
TSH SERPL DL<=0.005 MIU/L-ACNC: 1.69 UIU/ML (ref 0.38–5.33)
WBC # BLD AUTO: 10 K/UL (ref 4.8–10.8)

## 2017-12-27 PROCEDURE — 36415 COLL VENOUS BLD VENIPUNCTURE: CPT

## 2017-12-27 PROCEDURE — 80061 LIPID PANEL: CPT

## 2017-12-27 PROCEDURE — 85025 COMPLETE CBC W/AUTO DIFF WBC: CPT

## 2017-12-27 PROCEDURE — 80053 COMPREHEN METABOLIC PANEL: CPT

## 2017-12-27 PROCEDURE — 84443 ASSAY THYROID STIM HORMONE: CPT

## 2017-12-27 PROCEDURE — 83036 HEMOGLOBIN GLYCOSYLATED A1C: CPT

## 2017-12-28 ENCOUNTER — TELEPHONE (OUTPATIENT)
Dept: MEDICAL GROUP | Facility: MEDICAL CENTER | Age: 62
End: 2017-12-28

## 2017-12-28 ENCOUNTER — HOSPITAL ENCOUNTER (OUTPATIENT)
Facility: MEDICAL CENTER | Age: 62
End: 2017-12-28
Attending: INTERNAL MEDICINE
Payer: COMMERCIAL

## 2017-12-28 DIAGNOSIS — R19.7 DIARRHEA, UNSPECIFIED TYPE: ICD-10-CM

## 2017-12-28 LAB
C DIFF DNA SPEC QL NAA+PROBE: NEGATIVE
C DIFF TOX GENS STL QL NAA+PROBE: NEGATIVE

## 2017-12-28 PROCEDURE — 87899 AGENT NOS ASSAY W/OPTIC: CPT

## 2017-12-28 PROCEDURE — 87045 FECES CULTURE AEROBIC BACT: CPT

## 2017-12-28 PROCEDURE — 87046 STOOL CULTR AEROBIC BACT EA: CPT

## 2017-12-28 PROCEDURE — 87493 C DIFF AMPLIFIED PROBE: CPT

## 2017-12-28 NOTE — TELEPHONE ENCOUNTER
----- Message from Phil Kohler M.D. sent at 12/28/2017 12:41 AM PST -----  Called the patient and left message  Please notify him that:  (1) his cholesterol has slightly worsened compared to 8 months ago, total cholesterol 215 compared to 190, good cholesterol is still very good at 53 (goal is less than 100), bad cholesterol is 122, previously it was 110, (goal is less than 100), no need to adjust his cholesterol medication, continue on the same dose and try to optimize his nutrition and exercise program  (2) fasting blood sugar is 98, the 3 month blood sugar percent is 5.8% unchanged from one year ago, he still has slightly elevated blood sugar, but no diabetes; again we will try to optimize his nutrition and exercise program  (3) his liver function, kidney function, thyroid blood tests are normal

## 2017-12-28 NOTE — TELEPHONE ENCOUNTER
Called the patient and left message  Please notify him that:  (1) his cholesterol has slightly worsened compared to 8 months ago, total cholesterol 215 compared to 190, good cholesterol is still very good at 53 (goal is less than 100), bad cholesterol is 122, previously it was 110, (goal is less than 100), no need to adjust his cholesterol medication, continue on the same dose and try to optimize his nutrition and exercise program  (2) fasting blood sugar is 98, the 3 month blood sugar percent is 5.8% unchanged from one year ago, he still has slightly elevated blood sugar, but no diabetes; again we will try to optimize his nutrition and exercise program  (3) his liver function, kidney function, thyroid blood tests are normal

## 2017-12-28 NOTE — TELEPHONE ENCOUNTER
Spoke to Pt, all results reviewed. Pt updated phone number in chart and is sorry he had forgot to do this earlier. Pt did complete stool samples this morning as well.

## 2017-12-29 LAB
E COLI SXT1+2 STL IA: NORMAL
SIGNIFICANT IND 70042: NORMAL
SITE SITE: NORMAL
SOURCE SOURCE: NORMAL

## 2018-01-01 LAB
BACTERIA STL CULT: NORMAL
E COLI SXT1+2 STL IA: NORMAL
SIGNIFICANT IND 70042: NORMAL
SITE SITE: NORMAL
SOURCE SOURCE: NORMAL

## 2018-01-02 ENCOUNTER — TELEPHONE (OUTPATIENT)
Dept: MEDICAL GROUP | Facility: MEDICAL CENTER | Age: 63
End: 2018-01-02

## 2018-01-02 DIAGNOSIS — Z12.11 COLON CANCER SCREENING: ICD-10-CM

## 2018-01-02 DIAGNOSIS — R19.7 DIARRHEA, UNSPECIFIED TYPE: ICD-10-CM

## 2018-01-02 NOTE — TELEPHONE ENCOUNTER
----- Message from Phil Kohler M.D. sent at 1/1/2018 11:20 PM PST -----  Please notify patient that his stool tests are negative for infection, if he still having loose stools?

## 2018-01-31 DIAGNOSIS — I10 HYPERTENSION, UNSPECIFIED TYPE: ICD-10-CM

## 2018-01-31 RX ORDER — VALSARTAN AND HYDROCHLOROTHIAZIDE 160; 25 MG/1; MG/1
1 TABLET ORAL DAILY
Qty: 90 TAB | Refills: 3 | Status: SHIPPED | OUTPATIENT
Start: 2018-01-31 | End: 2020-12-04 | Stop reason: SDUPTHER

## 2018-05-02 DIAGNOSIS — E78.5 DYSLIPIDEMIA: ICD-10-CM

## 2018-05-02 RX ORDER — PRAVASTATIN SODIUM 20 MG
20 TABLET ORAL DAILY
Qty: 90 TAB | Refills: 3 | Status: SHIPPED | OUTPATIENT
Start: 2018-05-02 | End: 2019-04-09 | Stop reason: SDUPTHER

## 2018-05-07 ENCOUNTER — OFFICE VISIT (OUTPATIENT)
Dept: MEDICAL GROUP | Facility: MEDICAL CENTER | Age: 63
End: 2018-05-07

## 2018-05-07 VITALS
WEIGHT: 315 LBS | HEIGHT: 71 IN | RESPIRATION RATE: 16 BRPM | BODY MASS INDEX: 44.1 KG/M2 | HEART RATE: 88 BPM | TEMPERATURE: 99.2 F | SYSTOLIC BLOOD PRESSURE: 128 MMHG | DIASTOLIC BLOOD PRESSURE: 80 MMHG | OXYGEN SATURATION: 98 %

## 2018-05-07 DIAGNOSIS — E78.5 DYSLIPIDEMIA: Chronic | ICD-10-CM

## 2018-05-07 DIAGNOSIS — I10 ESSENTIAL HYPERTENSION: Chronic | ICD-10-CM

## 2018-05-07 DIAGNOSIS — M54.2 NECK PAIN: ICD-10-CM

## 2018-05-07 DIAGNOSIS — D22.9 ATYPICAL MOLE: ICD-10-CM

## 2018-05-07 DIAGNOSIS — L57.0 ACTINIC KERATOSES: ICD-10-CM

## 2018-05-07 DIAGNOSIS — R42 DIZZINESS: ICD-10-CM

## 2018-05-07 PROBLEM — H16.133 ACTINIC KERATITIS OF BOTH EYES: Status: ACTIVE | Noted: 2018-05-07

## 2018-05-07 PROCEDURE — 99214 OFFICE O/P EST MOD 30 MIN: CPT | Performed by: NURSE PRACTITIONER

## 2018-05-07 ASSESSMENT — PAIN SCALES - GENERAL: PAINLEVEL: 4=SLIGHT-MODERATE PAIN
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
## 2018-05-07 NOTE — ASSESSMENT & PLAN NOTE
Patient has been having intermittent dizziness over the last several months. He states it is not severe, he just feels a little off balance. He was attributing it to his blood pressure medication or being dehydrated, however since patient has began having a sharp right-sided neck pain and he was concerned that it may be related to his carotid arteries. He denies headache, vision changes, confusion, numbness or tingling on his face or extremities.
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
## 2018-05-07 NOTE — ASSESSMENT & PLAN NOTE
Patient has an irregular shaped mole on his scalp that he wants to have checked as it has been changing in appearance. He is up out in the sun several hours a day. He generally does not put sunscreen on his head. He does try to wear hats.

## 2018-05-07 NOTE — ASSESSMENT & PLAN NOTE
Patient has multiple hard, crusted spots on bilateral arms. He has noticed them develop over the last month or so. He spends several hours per day out [place] the sun [place] property. He tries to remember to wear sunscreen but does not always. No history of skin cancer. He is requesting a referral for dermatology.

## 2018-05-17 ENCOUNTER — TELEPHONE (OUTPATIENT)
Dept: MEDICAL GROUP | Facility: MEDICAL CENTER | Age: 63
End: 2018-05-17

## 2018-05-17 ENCOUNTER — HOSPITAL ENCOUNTER (OUTPATIENT)
Dept: RADIOLOGY | Facility: MEDICAL CENTER | Age: 63
End: 2018-05-17
Attending: NURSE PRACTITIONER
Payer: COMMERCIAL

## 2018-05-17 DIAGNOSIS — R42 DIZZINESS: ICD-10-CM

## 2018-05-17 DIAGNOSIS — M54.2 NECK PAIN: ICD-10-CM

## 2018-05-17 DIAGNOSIS — I10 ESSENTIAL HYPERTENSION: Chronic | ICD-10-CM

## 2018-05-17 DIAGNOSIS — E78.5 DYSLIPIDEMIA: Chronic | ICD-10-CM

## 2018-05-17 PROCEDURE — 93880 EXTRACRANIAL BILAT STUDY: CPT

## 2018-05-17 NOTE — TELEPHONE ENCOUNTER
----- Message from YULIYA Lee sent at 5/17/2018 12:12 PM PDT -----  Please notify patient that the ultrasound if his carotid artery showed a mild amount of plaque, but no blockage or reduced blood flow.  This is likely not the cause of his neck pain and dizziness.    YULIYA Lee

## 2018-07-04 PROBLEM — D22.9 ATYPICAL MOLE: Status: RESOLVED | Noted: 2018-05-07 | Resolved: 2018-07-04

## 2018-07-04 PROBLEM — R42 DIZZINESS: Status: RESOLVED | Noted: 2018-05-07 | Resolved: 2018-07-04

## 2018-07-04 PROBLEM — L57.0 ACTINIC KERATOSES: Status: RESOLVED | Noted: 2018-05-07 | Resolved: 2018-07-04

## 2018-08-20 ENCOUNTER — OFFICE VISIT (OUTPATIENT)
Dept: DERMATOLOGY | Facility: IMAGING CENTER | Age: 63
End: 2018-08-20
Payer: COMMERCIAL

## 2018-08-20 DIAGNOSIS — Q82.8 ACCESSORY SKIN TAGS: ICD-10-CM

## 2018-08-20 DIAGNOSIS — L82.1 SEBORRHEIC KERATOSES: ICD-10-CM

## 2018-08-20 DIAGNOSIS — L57.8 SUN-DAMAGED SKIN: ICD-10-CM

## 2018-08-20 DIAGNOSIS — D18.01 CHERRY ANGIOMA: ICD-10-CM

## 2018-08-20 PROCEDURE — 17110 DESTRUCTION B9 LES UP TO 14: CPT | Performed by: NURSE PRACTITIONER

## 2018-08-20 PROCEDURE — 99213 OFFICE O/P EST LOW 20 MIN: CPT | Mod: 25 | Performed by: NURSE PRACTITIONER

## 2018-08-20 NOTE — PROGRESS NOTES
Chief Complaint   Patient presents with   • Skin Lesion         HISTORY OF THE PRESENT ILLNESS: Patient is a 63 y.o. male. This pleasant patient is here today regarding skin lesions.   He has no personal or family history of skin cancer. He did chew tobacco some years ago.     Seborrheic keratoses  Patient has some rough lesions on his scalp and face he has been concerned about.  He indicates these are itchy at times    Accessory skin tags  He has skin tags around his neck that bother him.       Allergies: Ace inhibitors; Latex; Pcn [penicillins]; and Zocor [simvastatin]                    Review of Systems   Skin:rough lesions on his scalp, skin tags on his neck.     Exam: There were no vitals taken for this visit.  General: Normal appearing. No distress.  Skin:  Scalp: multiple seborrheic keratosis over the scalp.  No concerning lesions are seen.  2 seborrheic keratosis are noted on the left temple area of the face.  These 2 seborrheic chart ptosis are treated with liquid nitrogen as patient states they do cause some itching and distress.  Also areas of sun damage.  Neck with scattered skin tags.  Trunk and back with scattered cherry angioma and seborrheic keratosis no concerning lesions are seen.    Please note that this dictation was created using voice recognition software. I have made every reasonable attempt to correct obvious errors, but I expect that there are errors of grammar and possibly content that I did not discover before finalizing the note.      Assessment/Plan  1. Seborrheic keratoses      We discussed these are benign lesions that can be treated with liquid nitrogen that are recurrent and persistent   2. Accessory skin tags      We discussed this can be treated cosmetically if desired   3. Cherry angioma      We discussed these are benign lesions that can be treated cosmetically if desired   4. Sun-damaged skin      He is encouraged to use sunscreen daily.   Is given written information on self  screening for melanoma and the use of sunscreen from the [employer] of [employer].

## 2018-12-05 ENCOUNTER — TELEPHONE (OUTPATIENT)
Dept: MEDICAL GROUP | Facility: MEDICAL CENTER | Age: 63
End: 2018-12-05

## 2018-12-05 NOTE — TELEPHONE ENCOUNTER
ESTABLISHED PATIENT PRE-VISIT PLANNING     Patient was NOT contacted to complete PVP.     Note: Patient will not be contacted if there is no indication to call.     1.  Reviewed notes from the last few office visits within the medical group: Yes 12/26/17, 5/7/18    2.  If any orders were placed at last visit or intended to be done for this visit (i.e. 6 mos follow-up), do we have Results/Consult Notes?        •  Labs - Labs ordered, completed on 12/27/18 and results are in chart.   Note: If patient appointment is for lab review and patient did not complete labs, check with provider if OK to reschedule patient until labs completed.       •  Imaging - Imaging ordered, completed and results are in chart.       •  Referrals - Referral ordered, patient has NOT been seen.    3. Is this appointment scheduled as a Hospital Follow-Up? No    4.  Immunizations were updated in [facility] using WebIZ?: Yes       •  Web Iz Recommendations: PREVNAR (PCV13) , TD and ZOSTAVAX (Shingles)    5.  Patient is due for the following Health Maintenance Topics:   Health Maintenance Due   Topic Date Due   • IMM ZOSTER VACCINES (2 of 3) 12/04/2014   • IMM PNEUMOCOCCAL 65+ (ADULT) LOW/MEDIUM RISK SERIES (1 of 2 - PCV13) 04/18/2018     6.  MDX printed for Provider? NO    7.  Patient was NOT informed to arrive 15 min prior to their scheduled appointment and bring in their medication bottles.

## 2019-03-11 ENCOUNTER — TELEPHONE (OUTPATIENT)
Dept: MEDICAL GROUP | Facility: MEDICAL CENTER | Age: 64
End: 2019-03-11

## 2019-03-11 DIAGNOSIS — I10 ESSENTIAL HYPERTENSION: ICD-10-CM

## 2019-03-11 RX ORDER — VALSARTAN AND HYDROCHLOROTHIAZIDE 160; 25 MG/1; MG/1
1 TABLET ORAL DAILY
Qty: 90 TAB | Refills: 0 | Status: SHIPPED | OUTPATIENT
Start: 2019-03-11 | End: 2019-06-03 | Stop reason: SDUPTHER

## 2019-04-09 PROBLEM — Q82.8 ACCESSORY SKIN TAGS: Status: RESOLVED | Noted: 2018-08-20 | Resolved: 2019-04-09

## 2019-05-31 PROBLEM — L82.1 SEBORRHEIC KERATOSES: Status: RESOLVED | Noted: 2018-08-20 | Resolved: 2019-05-31

## 2019-06-06 ENCOUNTER — OFFICE VISIT (OUTPATIENT)
Dept: MEDICAL GROUP | Facility: MEDICAL CENTER | Age: 64
End: 2019-06-06
Payer: COMMERCIAL

## 2019-06-06 VITALS
WEIGHT: 315 LBS | BODY MASS INDEX: 44.1 KG/M2 | TEMPERATURE: 98.7 F | HEIGHT: 71 IN | HEART RATE: 99 BPM | DIASTOLIC BLOOD PRESSURE: 80 MMHG | OXYGEN SATURATION: 95 % | SYSTOLIC BLOOD PRESSURE: 136 MMHG

## 2019-06-06 DIAGNOSIS — Z12.11 COLON CANCER SCREENING: ICD-10-CM

## 2019-06-06 DIAGNOSIS — N40.1 BENIGN PROSTATIC HYPERPLASIA WITH URINARY FREQUENCY: Chronic | ICD-10-CM

## 2019-06-06 DIAGNOSIS — R73.01 IMPAIRED FASTING GLUCOSE: Chronic | ICD-10-CM

## 2019-06-06 DIAGNOSIS — R35.0 BENIGN PROSTATIC HYPERPLASIA WITH URINARY FREQUENCY: Chronic | ICD-10-CM

## 2019-06-06 DIAGNOSIS — I10 ESSENTIAL HYPERTENSION: Chronic | ICD-10-CM

## 2019-06-06 DIAGNOSIS — Z11.59 NEED FOR HEPATITIS C SCREENING TEST: ICD-10-CM

## 2019-06-06 DIAGNOSIS — Z23 NEED FOR PNEUMOCOCCAL VACCINE: ICD-10-CM

## 2019-06-06 DIAGNOSIS — Z00.00 PREVENTATIVE HEALTH CARE: Primary | Chronic | ICD-10-CM

## 2019-06-06 DIAGNOSIS — E66.01 MORBID OBESITY (HCC): Chronic | ICD-10-CM

## 2019-06-06 DIAGNOSIS — Z12.5 PROSTATE CANCER SCREENING: ICD-10-CM

## 2019-06-06 DIAGNOSIS — E78.5 DYSLIPIDEMIA: Chronic | ICD-10-CM

## 2019-06-06 PROCEDURE — 99397 PER PM REEVAL EST PAT 65+ YR: CPT | Mod: 25 | Performed by: INTERNAL MEDICINE

## 2019-06-06 PROCEDURE — 90471 IMMUNIZATION ADMIN: CPT | Performed by: INTERNAL MEDICINE

## 2019-06-06 PROCEDURE — 90670 PCV13 VACCINE IM: CPT | Performed by: INTERNAL MEDICINE

## 2019-06-06 RX ORDER — TAMSULOSIN HYDROCHLORIDE 0.4 MG/1
0.4 CAPSULE ORAL
Qty: 30 CAP | Refills: 3 | Status: SHIPPED | OUTPATIENT
Start: 2019-06-06 | End: 2019-09-21 | Stop reason: SDUPTHER

## 2019-06-06 ASSESSMENT — ENCOUNTER SYMPTOMS
COUGH: 0
HEARTBURN: 0
INSOMNIA: 0
DOUBLE VISION: 0
ABDOMINAL PAIN: 0
DEPRESSION: 0
BLURRED VISION: 0
CONSTIPATION: 0
PALPITATIONS: 0
BLOOD IN STOOL: 0

## 2019-06-06 ASSESSMENT — PATIENT HEALTH QUESTIONNAIRE - PHQ9: CLINICAL INTERPRETATION OF PHQ2 SCORE: 0

## 2019-06-06 NOTE — LETTER
June 6, 2019          Dear Sir raina Best,    This letter is in regards to Mr. Darci Davis ([birth year]).  Mr. Davis was seen today for his annual examination, and his resting blood pressure while seated was 138/80.  Currently his blood pressure is stable and controlled on Diovan /25 mg/day.        Sincerely,        Philshy Kohler M.D.    Electronically Signed

## 2019-06-06 NOTE — PROGRESS NOTES
Subjective:      Darci Davis is a 64 y.o. male who presents with Annual Exam            HPI   Here for annual exam.  Medications, allergies, medical history, surgical history, social history, family history reviewed and updated.  Hypertension on Diovan /25 mg daily. Checks blood pressure once a month but more elevated recently 130-140/85.  Dyslipidemia on pravastatin  Has been eating healthy and limiting sweets and candies, no soda, occasional diet soda, will eat two meals per day, oatmeal in am and dinner will eat chicken and vegetable. Tries to avoid snacks during the day, granola bar for lunch.  Walking 3000 steps per day  SH works at [employer], no tobacco, no etoh, , drinks three cups of coffee in am, occasional powerade drink   FH brother  lung cancer recently   Eye exam annually [facility] uses glasses reading and driving, hearing no changes  Occasional urinary frequency during the day, nocturia x1, does drink caffeinated beverages during the day      Current Outpatient Prescriptions   Medication Sig Dispense Refill   • valsartan-hydrochlorothiazide (DIOVAN-HCT) 160-25 MG per tablet Take 1 Tab by mouth every day. 90 Tab 0   • pravastatin (PRAVACHOL) 20 MG Tab Take 1 Tab by mouth every day. 90 Tab 0   • valsartan-hydrochlorothiazide (DIOVAN-HCT) 160-25 MG per tablet Take 1 Tab by mouth every day. 90 Tab 3   • trazodone (DESYREL) 50 MG Tab Take 1 Tab by mouth at bedtime as needed for Sleep. 30 Tab 4   • albuterol 108 (90 BASE) MCG/ACT Aero Soln inhalation aerosol Inhale 2 Puffs by mouth every four hours as needed for Shortness of Breath. 1 Inhaler 0   • Cholecalciferol (VITAMIN D) 2000 UNITS Cap Take  by mouth every day.     • triamcinolone acetonide (KENALOG) 0.1 % CREA Apply affected area bid prn itching 45 g 4   • naproxen (NAPROSYN) 500 MG TABS Take 1 Tab by mouth 2 times a day as needed (pain). 60 Tab 6     No current facility-administered medications for this visit.          Status post total knee replacement  6/08  right meniscus repair  5/11 MRI left knee medial meniscus tear, moderate chondral thinning, chondromalacia  6/11  ortho left knee medial and lateral meniscectomy  7/12  left knee injection  12/12 now sees dr.mendez ortho  1/13 dr.webster bolanos note, injection left knee  5/21/13  ortho note, injection left knee  3/31/15  ortho note; left knee injection steroid  5/29/15  ortho note knee arthritis,second injection euflexxa left knee  7/11/16  orthopedic operative note, left total knee arthroplasty  1/31/17  orthopedic note 6 months status post left total knee, x-ray left knee 3 view good component position and alignment, follow-up 6 months repeat x-rays     Renal cyst  12/9/13 ultrasound abdomen left renal 18 mm hypoechoic mass most likely cysts, MRI or CT to clarify  12/13/13 CT abd/pelvis with and without; 8 mm simple appearing cyst in the lower pole the left kidney which is discordant in size with the recent ultrasound. Followup ultrasound surveillance or MRI examination may be of value to further study the left kidney,11 mm in diameter simple cyst in the midpole region of the right kidney  11/17/14 ultrasound renal kidneys are poorly visualized due to poor sonographic penetration of the current exam,no focal renal cyst or mass is identified in the left kidney as seen on prior imaging studies at this time     Preventative health  10/9/14 zoster vaccine  5/28/15 tdap  3/16/17 [facility] referral placed again colon cancer screening  4/6/17 vit d 32  4/6/17 psa 2.8  12/31/17 stool tests negative  1/24/18 [facility] note schedule screening colonoscopy     Morbid obesity  7/2/13 declines bariatric eval  7/12/13 apnea link AHI 2, desaturation <89% for 27 min, total sleep time 6 hours 34 min  12/2/13  referral bariatric surgery  10/9/14 BMI 44.3; referral to  weight loss bariatric  5/28/15 BMI 46.3 has seen   and declines bariatric surgery, trial orlistat  12/29/15 BMI 46.3   2/4/16 patient called, insurance does not cover bariatric surgery  6/16/16 BMI 45.2  9/16/16 BMI 42.8   3/6/17 BMI 44.3 insurance not cover bariatric, declines nutrition consultation; 20-30 minutes bike per day     low back pain  12/12 x-ray hip bilateral mild DJD  12/12 x-ray lumbar spine multilevel DJD and arthropathy, mild to moderate dextroconvex scoliosis     Impaired fasting blood sugar  10/10/14 bs 114  6/3/15 A1c 5.9%,bs 94  1/6/16 bs 104,A1c 6.1%  6/21/16 A1c 5.8%  4/6/17 bs 104  12/27/17 A1c 5.8%     Hypertension  9/08 urine mac 9, intolerant ace cough  Was on diovan hct 320/12.5 caused lightheadedness, taking half pill a day  10/14/11 on diovan 320/12.5 mg  11/3/11 change to diovan 320 mg, combo dose caused fatigue  12/6/12 change to diovan 160/25 mg daily  12/12 urine mac 4 on diovan hct 160/25 mg  12/2/13 urine mac <0.5 on diovan hct 160/25 mg  10/10/14 urine mac 6 on diovan 160/25 mg  6/3/15 urine mac <0.5 on diovan 160/25 mg  1/6/16 urine mac <0.5 on diovan hct 160/25 mg  4/6/17 urine mac <0.7 on diovan hct 160/25 mg     Hypogonadism  10/10/14 testosterone 201,free testosterone 49  1/6/16 testosterone 150,free testosterone 28  2/12/16 testosterone 179,prolactin 7,FSH 2.5,LH 1.0,SHBG 37  2/16/16 will try androgel 20.25 mg apply two per day  6/16/16 off androgel     Dyslipidemia  9/08 chol 213, trig 119, hdl 49, ldl 140  5/09 chol 171,trig 118,hdl 51,ldl 96  9/10 hold pravachol  3/11 chol 247,trig 143,hdl 51,ldl 167   3/11 start lipitor samples  5/11 need to try crestor per insurance, trial of crestor 10 mg  10/14/11 on lipitor 20 mg insurance cleared  10/24/11 chol 203,trig 102,hdl 52,ldl 131 off med; start lipitor 20 mg  12/12 chol 167,trig 114,hdl 53,ldl 91 on lipitor 20 mg  12/2/13 chol 199,trig 167,hdl 52,ldl 114 on lipitor 20 mg  10/10/14 chol 198,trig 140,hdl 55,ldl 115 on lipitor 20 mg  6/3/15 chol 162,trig 159,hdl  50,ldl 80 on lipitor 20 mg  12/29/15 off lipitor  1/6/16 chol 233,trig 147,hdl 52,ldl 142 off lipitor  1/7/16 start pravachol 20 mg  2/12/16 chol 190,trig 134,hdl 46,ldl 117 on pravachol 20 mg  6/21/16 chol 184,trig 131,hdl 54,ldl 104 on pravachol 20 mg  4/6/17 chol 190,trig 147,hdl 51,ldl 110 on pravachol 20 mg  12/27/17 chol 215,trig 201,hdl 53,ldl 122 on pravachol 20 mg  (lipitor cause muscle pain)     cervical pain  2/11/16 [place] note evaluation cervical pain, x-rays cervical spine from february 2016 showed lordosis C4-C6 and DJD, slight C4-C5 spondylolisthesis, will obtain cervical flexion and extension x-rays, continue naprosyn and robaxin as needed, start physical therapy, follow-up 6 weeks if no improvement consider MRI     BPH  5/28/15 start Flomax  12/29/15 start cialis 5 mg daily   4/6/17 psa 2.8          Depression Screening    Little interest or pleasure in doing things?  0 - not at all  Feeling down, depressed , or hopeless? 0 - not at all  Patient Health Questionnaire Score: 0            Health Maintenance Summary                HEPATITIS C SCREENING Overdue 1955     IMM ZOSTER VACCINES Overdue 12/4/2014      Done 10/9/2014 Imm Admin: Zoster Vaccine Live (ZVL) (Zostavax)    IMM PNEUMOCOCCAL 65+ (ADULT) LOW/MEDIUM RISK SERIES Overdue 4/18/2018     COLONOSCOPY Next Due 12/19/2022      Patient Declined 12/19/2012 pt did not contact [facility]    IMM DTaP/Tdap/Td Vaccine Next Due 5/28/2025      Done 5/28/2015 Imm Admin: Tdap Vaccine     Patient has more history with this topic...          Patient Care Team:  Philshy Kohler M.D. as PCP - General             Patient Active Problem List   Diagnosis   • Hypertension   • Morbid obesity (HCC)   • Status post total left knee replacement   • Preventative health care   • Dyslipidemia   • Low back pain   • Renal cyst   • Impaired fasting glucose   • BPH (benign prostatic hypertrophy)   • Hypogonadism male   • Cervical pain         Review of Systems   HENT:  Negative for hearing loss and tinnitus.    Eyes: Negative for blurred vision and double vision.   Respiratory: Negative for cough.    Cardiovascular: Negative for chest pain and palpitations.   Gastrointestinal: Negative for abdominal pain, blood in stool, constipation and heartburn.   Genitourinary: Positive for frequency and urgency.        Nocturia x 1   Musculoskeletal: Positive for joint pain.   Skin: Negative for rash.   Endo/Heme/Allergies: Negative for environmental allergies.   Psychiatric/Behavioral: Negative for depression. The patient does not have insomnia.             Physical Exam   Constitutional: He appears well-developed and well-nourished. No distress.   HENT:   Head: Normocephalic and atraumatic.   Right Ear: External ear normal.   Left Ear: External ear normal.   Mouth/Throat: Oropharynx is clear and moist.   Eyes: Conjunctivae are normal. Right eye exhibits no discharge. Left eye exhibits no discharge.   Neck: Neck supple. No JVD present.   Cardiovascular: Normal rate, regular rhythm and normal heart sounds.    Pulmonary/Chest: Effort normal and breath sounds normal. No respiratory distress. He has no wheezes.   Abdominal: He exhibits no distension.   Musculoskeletal: He exhibits no edema.   Neurological: He is alert.   Skin: Skin is warm. He is not diaphoretic.   Psychiatric: He has a normal mood and affect. His behavior is normal. Thought content normal.   Nursing note and vitals reviewed.    Repeat blood pressure right arm today 138/80 sitting      Prostate mildly enlarged, no nodules, guaiac-negative     Assessment/Plan:     Assessment  #! Annual exam     #2 dyslipidemia 12/27/17 chol 215,trig 201,hdl 53,ldl 122 on pravachol 20 mg  (lipitor cause muscle pain)     #3 Impaired fasting blood sugar 12/27/17 A1c 5.8%     #4 Hypertension on diovan hct 160/25 mg blood pressure done by myself today 138/80 while seated.     #5 Morbid obesity BMI 46.0 declines bariatric surgery had apnea link 7/6/13  negative has seen  bariatric surgery but declined bariatric surgery previously.     #6 Status post total knee replacement and able to do all ADLs no falls     #7 Preventative health  10/9/14 zoster vaccine  5/28/15 tdap  3/16/17 [facility] referral placed again colon cancer screening  4/6/17 vit d 32  4/6/17 psa 2.8     #8 BPH, has tried Flomax previously    Plan  #! Weight management program referral I believe this is indicated given his morbid obesity, risk factors of dyslipidemia, hypertension, impaired fasting blood sugar, patient agrees to the referral    #2 prevnar today, pneumococcal 23 in 1 year    #3 shingrix check at pharmacy due to us being out    #4 colon cancer screening referral [facility]    #5 nutrition, diet, exercise discussed, importance of weight loss emphasized as obesity can increase risk for diabetes and heart disease walking 3000 steps per day currently, increase to at least 5000 steps per day, goal 10,000 steps per day    #6 check blood pressure daily and record, continue Diovan HCT no changes, follow-up 4 months repeat blood pressure check, check and record blood pressure at home daily and send us an update    #7 continue avoid alcohol, no tobacco    #8 continue pravastatin    #9 trial of Flomax 1 p.o. daily can cause lightheadedness, dizziness

## 2019-06-07 ENCOUNTER — TELEPHONE (OUTPATIENT)
Dept: MEDICAL GROUP | Facility: MEDICAL CENTER | Age: 64
End: 2019-06-07

## 2019-06-07 ENCOUNTER — HOSPITAL ENCOUNTER (OUTPATIENT)
Dept: LAB | Facility: MEDICAL CENTER | Age: 64
End: 2019-06-07
Attending: INTERNAL MEDICINE
Payer: COMMERCIAL

## 2019-06-07 DIAGNOSIS — Z12.5 PROSTATE CANCER SCREENING: ICD-10-CM

## 2019-06-07 DIAGNOSIS — Z00.00 PREVENTATIVE HEALTH CARE: Chronic | ICD-10-CM

## 2019-06-07 LAB
25(OH)D3 SERPL-MCNC: 26 NG/ML (ref 30–100)
ALBUMIN SERPL BCP-MCNC: 4.2 G/DL (ref 3.2–4.9)
ALBUMIN/GLOB SERPL: 1.4 G/DL
ALP SERPL-CCNC: 49 U/L (ref 30–99)
ALT SERPL-CCNC: 16 U/L (ref 2–50)
ANION GAP SERPL CALC-SCNC: 11 MMOL/L (ref 0–11.9)
APPEARANCE UR: CLEAR
AST SERPL-CCNC: 16 U/L (ref 12–45)
BASOPHILS # BLD AUTO: 1.1 % (ref 0–1.8)
BASOPHILS # BLD: 0.13 K/UL (ref 0–0.12)
BILIRUB SERPL-MCNC: 0.5 MG/DL (ref 0.1–1.5)
BILIRUB UR QL STRIP.AUTO: NEGATIVE
BUN SERPL-MCNC: 23 MG/DL (ref 8–22)
CALCIUM SERPL-MCNC: 9.7 MG/DL (ref 8.5–10.5)
CHLORIDE SERPL-SCNC: 101 MMOL/L (ref 96–112)
CHOLEST SERPL-MCNC: 200 MG/DL (ref 100–199)
CO2 SERPL-SCNC: 27 MMOL/L (ref 20–33)
COLOR UR: YELLOW
CREAT SERPL-MCNC: 0.96 MG/DL (ref 0.5–1.4)
CREAT UR-MCNC: 74.3 MG/DL
EOSINOPHIL # BLD AUTO: 0.3 K/UL (ref 0–0.51)
EOSINOPHIL NFR BLD: 2.6 % (ref 0–6.9)
ERYTHROCYTE [DISTWIDTH] IN BLOOD BY AUTOMATED COUNT: 48.8 FL (ref 35.9–50)
EST. AVERAGE GLUCOSE BLD GHB EST-MCNC: 126 MG/DL
FASTING STATUS PATIENT QL REPORTED: NORMAL
GLOBULIN SER CALC-MCNC: 3.1 G/DL (ref 1.9–3.5)
GLUCOSE SERPL-MCNC: 102 MG/DL (ref 65–99)
GLUCOSE UR STRIP.AUTO-MCNC: NEGATIVE MG/DL
HBA1C MFR BLD: 6 % (ref 0–5.6)
HCT VFR BLD AUTO: 53.1 % (ref 42–52)
HCV AB SER QL: NEGATIVE
HDLC SERPL-MCNC: 50 MG/DL
HGB BLD-MCNC: 16.9 G/DL (ref 14–18)
IMM GRANULOCYTES # BLD AUTO: 0.04 K/UL (ref 0–0.11)
IMM GRANULOCYTES NFR BLD AUTO: 0.3 % (ref 0–0.9)
KETONES UR STRIP.AUTO-MCNC: NEGATIVE MG/DL
LDLC SERPL CALC-MCNC: 111 MG/DL
LEUKOCYTE ESTERASE UR QL STRIP.AUTO: NEGATIVE
LYMPHOCYTES # BLD AUTO: 2.67 K/UL (ref 1–4.8)
LYMPHOCYTES NFR BLD: 23.1 % (ref 22–41)
MCH RBC QN AUTO: 31.7 PG (ref 27–33)
MCHC RBC AUTO-ENTMCNC: 31.8 G/DL (ref 33.7–35.3)
MCV RBC AUTO: 99.6 FL (ref 81.4–97.8)
MICRO URNS: NORMAL
MICROALBUMIN UR-MCNC: <0.7 MG/DL
MICROALBUMIN/CREAT UR: NORMAL MG/G (ref 0–30)
MONOCYTES # BLD AUTO: 0.99 K/UL (ref 0–0.85)
MONOCYTES NFR BLD AUTO: 8.6 % (ref 0–13.4)
NEUTROPHILS # BLD AUTO: 7.43 K/UL (ref 1.82–7.42)
NEUTROPHILS NFR BLD: 64.3 % (ref 44–72)
NITRITE UR QL STRIP.AUTO: NEGATIVE
NRBC # BLD AUTO: 0 K/UL
NRBC BLD-RTO: 0 /100 WBC
PH UR STRIP.AUTO: 6.5 [PH]
PLATELET # BLD AUTO: 277 K/UL (ref 164–446)
PMV BLD AUTO: 11.2 FL (ref 9–12.9)
POTASSIUM SERPL-SCNC: 4.1 MMOL/L (ref 3.6–5.5)
PROT SERPL-MCNC: 7.3 G/DL (ref 6–8.2)
PROT UR QL STRIP: NEGATIVE MG/DL
PSA SERPL-MCNC: 2.16 NG/ML (ref 0–4)
RBC # BLD AUTO: 5.33 M/UL (ref 4.7–6.1)
RBC UR QL AUTO: NEGATIVE
SODIUM SERPL-SCNC: 139 MMOL/L (ref 135–145)
SP GR UR STRIP.AUTO: 1.02
TRIGL SERPL-MCNC: 195 MG/DL (ref 0–149)
TSH SERPL DL<=0.005 MIU/L-ACNC: 2.06 UIU/ML (ref 0.38–5.33)
UROBILINOGEN UR STRIP.AUTO-MCNC: 0.2 MG/DL
WBC # BLD AUTO: 11.6 K/UL (ref 4.8–10.8)

## 2019-06-07 PROCEDURE — 84443 ASSAY THYROID STIM HORMONE: CPT

## 2019-06-07 PROCEDURE — 82043 UR ALBUMIN QUANTITATIVE: CPT

## 2019-06-07 PROCEDURE — 85025 COMPLETE CBC W/AUTO DIFF WBC: CPT

## 2019-06-07 PROCEDURE — 82306 VITAMIN D 25 HYDROXY: CPT

## 2019-06-07 PROCEDURE — 80053 COMPREHEN METABOLIC PANEL: CPT

## 2019-06-07 PROCEDURE — 81003 URINALYSIS AUTO W/O SCOPE: CPT

## 2019-06-07 PROCEDURE — 36415 COLL VENOUS BLD VENIPUNCTURE: CPT

## 2019-06-07 PROCEDURE — 86803 HEPATITIS C AB TEST: CPT

## 2019-06-07 PROCEDURE — 84153 ASSAY OF PSA TOTAL: CPT

## 2019-06-07 PROCEDURE — 82570 ASSAY OF URINE CREATININE: CPT

## 2019-06-07 PROCEDURE — 83036 HEMOGLOBIN GLYCOSYLATED A1C: CPT

## 2019-06-07 PROCEDURE — 80061 LIPID PANEL: CPT

## 2019-06-08 NOTE — TELEPHONE ENCOUNTER
Notified with results, A1c slightly elevated, no need for medication, continue work with nutrition, exercise, weight loss, referral has been placed to weight management team.  Vitamin D slightly low increase to 4000 units daily.  Continue pravastatin no changes.

## 2019-10-04 PROBLEM — M25.569 KNEE PAIN: Status: ACTIVE | Noted: 2019-10-04

## 2020-07-02 ENCOUNTER — OFFICE VISIT (OUTPATIENT)
Dept: MEDICAL GROUP | Facility: MEDICAL CENTER | Age: 65
End: 2020-07-02
Payer: COMMERCIAL

## 2020-07-02 VITALS
HEART RATE: 90 BPM | SYSTOLIC BLOOD PRESSURE: 160 MMHG | HEIGHT: 71 IN | DIASTOLIC BLOOD PRESSURE: 82 MMHG | WEIGHT: 315 LBS | OXYGEN SATURATION: 93 % | TEMPERATURE: 98.4 F | BODY MASS INDEX: 44.1 KG/M2

## 2020-07-02 DIAGNOSIS — E78.5 DYSLIPIDEMIA: Chronic | ICD-10-CM

## 2020-07-02 DIAGNOSIS — Z12.5 PROSTATE CANCER SCREENING: ICD-10-CM

## 2020-07-02 DIAGNOSIS — R35.1 NOCTURIA: ICD-10-CM

## 2020-07-02 DIAGNOSIS — Z00.00 PREVENTATIVE HEALTH CARE: Chronic | ICD-10-CM

## 2020-07-02 DIAGNOSIS — N40.1 BENIGN PROSTATIC HYPERPLASIA WITH URINARY FREQUENCY: Chronic | ICD-10-CM

## 2020-07-02 DIAGNOSIS — Z23 NEED FOR PNEUMOCOCCAL VACCINE: ICD-10-CM

## 2020-07-02 DIAGNOSIS — Z12.11 COLON CANCER SCREENING: ICD-10-CM

## 2020-07-02 DIAGNOSIS — I10 ESSENTIAL HYPERTENSION: Chronic | ICD-10-CM

## 2020-07-02 DIAGNOSIS — R35.0 BENIGN PROSTATIC HYPERPLASIA WITH URINARY FREQUENCY: Chronic | ICD-10-CM

## 2020-07-02 DIAGNOSIS — R73.09 IMPAIRED GLUCOSE METABOLISM: ICD-10-CM

## 2020-07-02 DIAGNOSIS — Z20.822 EXPOSURE TO COVID-19 VIRUS: ICD-10-CM

## 2020-07-02 DIAGNOSIS — E55.9 VITAMIN D DEFICIENCY: ICD-10-CM

## 2020-07-02 DIAGNOSIS — E66.01 MORBID OBESITY (HCC): Chronic | ICD-10-CM

## 2020-07-02 PROCEDURE — 99397 PER PM REEVAL EST PAT 65+ YR: CPT | Mod: 25 | Performed by: INTERNAL MEDICINE

## 2020-07-02 PROCEDURE — 90732 PPSV23 VACC 2 YRS+ SUBQ/IM: CPT | Performed by: INTERNAL MEDICINE

## 2020-07-02 PROCEDURE — 90471 IMMUNIZATION ADMIN: CPT | Performed by: INTERNAL MEDICINE

## 2020-07-02 RX ORDER — CELECOXIB 100 MG/1
100 CAPSULE ORAL
Qty: 30 CAP | Refills: 2 | Status: SHIPPED | OUTPATIENT
Start: 2020-07-02 | End: 2021-03-03 | Stop reason: SDUPTHER

## 2020-07-02 RX ORDER — DOXAZOSIN 2 MG/1
2 TABLET ORAL EVERY EVENING
Qty: 30 TAB | Refills: 2 | Status: SHIPPED | OUTPATIENT
Start: 2020-07-02 | End: 2022-01-14 | Stop reason: SDUPTHER

## 2020-07-02 ASSESSMENT — PATIENT HEALTH QUESTIONNAIRE - PHQ9: CLINICAL INTERPRETATION OF PHQ2 SCORE: 0

## 2020-07-02 ASSESSMENT — FIBROSIS 4 INDEX: FIB4 SCORE: 0.97

## 2020-07-02 NOTE — PROGRESS NOTES
Subjective:      Darci Davis is a 65 y.o. male who presents annual exam        HPI     Patient here for annual exam, medication, allergies, medical history, surgical history, social history, family history reviewed and updated  SH , no regular exercise but does do heavy lifting daily for one hour   Sees [facility] annually  Chronic back pain right side unable to tolerate naproxen due to GI side effects  Blood pressure at home runs 120-130/70 diovan hct   Impaired glucose metabolism no diabetes  Dyslipidemia pravastatin no muscle pain with statin therapy  BPH could not tolerate Flomax because of change in mood, stable off medication dysphoric mood, no anxiety, no depression, good social support with wife, works for [employer] is still keeps active on his own.  Will experience arthritis pains with working at home or at work  low back, hips, knees  Tried naproxen over-the-counter for joint pains but caused GI upset  No current nausea, vomiting, abdominal pain, melena  Practicing social distancing precautions with COVID    Current Outpatient Medications   Medication Sig Dispense Refill   • pravastatin (PRAVACHOL) 20 MG Tab TAKE 1 TABLET BY MOUTH EVERY DAY 90 Tab 0   • tamsulosin (FLOMAX) 0.4 MG capsule Take 1 Cap by mouth every day. 90 Cap 2   • valsartan-hydrochlorothiazide (DIOVAN-HCT) 160-25 MG per tablet Take 1 Tab by mouth every day. 90 Tab 3   • Probiotic Product (PRO-BIOTIC BLEND PO) Take  by mouth.     • valsartan-hydrochlorothiazide (DIOVAN-HCT) 160-25 MG per tablet Take 1 Tab by mouth every day. 90 Tab 3   • Cholecalciferol (VITAMIN D) 2000 UNITS Cap Take  by mouth every day.       No current facility-administered medications for this visit.         Status post total knee replacement  6/08  right meniscus repair  5/11 MRI left knee medial meniscus tear, moderate chondral thinning, chondromalacia  6/11  ortho left knee medial and lateral meniscectomy  7/12  left knee  injection  12/12 now sees  ortho  1/13  ortho note, injection left knee  5/21/13  ortho note, injection left knee  3/31/15  ortho note; left knee injection steroid  5/29/15  ortho note knee arthritis,second injection euflexxa left knee  7/11/16  orthopedic operative note, left total knee arthroplasty  1/31/17  orthopedic note 6 months status post left total knee, x-ray left knee 3 view good component position and alignment, follow-up 6 months repeat x-rays  8/27/19  orthopedic note injury right knee, x-ray loss of medial joint space findings consistent with lateral meniscus tear, obtain MRI follow-up after imaging completed     Renal cyst  12/9/13 ultrasound abdomen left renal 18 mm hypoechoic mass most likely cysts, MRI or CT to clarify  12/13/13 CT abd/pelvis with and without; 8 mm simple appearing cyst in the lower pole the left kidney which is discordant in size with the recent ultrasound. Followup ultrasound surveillance or MRI examination may be of value to further study the left kidney,11 mm in diameter simple cyst in the midpole region of the right kidney  11/17/14 ultrasound renal kidneys are poorly visualized due to poor sonographic penetration of the current exam,no focal renal cyst or mass is identified in the left kidney as seen on prior imaging studies at this time     Preventative health  10/9/14 zoster vaccine  5/28/15 tdap  12/31/17 stool tests negative  1/24/18 [facility] note schedule screening colonoscopy  6/5/19 prevnar  6/7/19 vit d 26 increase to 4000 units   6/7/19 psa 2.1     Morbid obesity  7/2/13 declines bariatric eval  7/12/13 apnea link AHI 2, desaturation <89% for 27 min, total sleep time 6 hours 34 min  12/2/13  referral bariatric surgery  10/9/14 BMI 44.3; referral to  weight loss bariatric  5/28/15 BMI 46.3 has seen  and declines bariatric surgery, trial orlistat  12/29/15 BMI 46.3   2/4/16 patient  called, insurance does not cover bariatric surgery  6/16/16 BMI 45.2  9/16/16 BMI 42.8   3/6/17 BMI 44.3 insurance not cover bariatric, declines nutrition consultation; 20-30 minutes bike per day     low back pain  12/12 x-ray hip bilateral mild DJD  12/12 x-ray lumbar spine multilevel DJD and arthropathy, mild to moderate dextroconvex scoliosis    knee pain  8/27/19  orthopedic note injury right knee, x-ray loss of medial joint space findings consistent with lateral meniscus tear, obtain MRI follow-up after imaging completed  10/1/19  orthopedic evaluation right knee pain, MRI right knee shows medial and patellofemoral degenerative joint changes options discussed conservative treatment versus right arthroscopy, patient elects to proceed with arthroscopy right knee     Impaired fasting blood sugar  10/10/14 bs 114  6/3/15 A1c 5.9%,bs 94  1/6/16 bs 104,A1c 6.1%  6/21/16 A1c 5.8%  4/6/17 bs 104  12/27/17 A1c 5.8%  6/7/19 A1c 6%     Hypertension  9/08 urine mac 9, intolerant ace cough  Was on diovan hct 320/12.5 caused lightheadedness, taking half pill a day  10/14/11 on diovan 320/12.5 mg  11/3/11 change to diovan 320 mg, combo dose caused fatigue  12/6/12 change to diovan 160/25 mg daily  12/12 urine mac 4 on diovan hct 160/25 mg  12/2/13 urine mac <0.5 on diovan hct 160/25 mg  10/10/14 urine mac 6 on diovan 160/25 mg  6/3/15 urine mac <0.5 on diovan 160/25 mg  1/6/16 urine mac <0.5 on diovan hct 160/25 mg  4/6/17 urine mac <0.7 on diovan hct 160/25 mg  5/16/18 ultrasound carotid negative      Hypogonadism  10/10/14 testosterone 201,free testosterone 49  1/6/16 testosterone 150,free testosterone 28  2/12/16 testosterone 179,prolactin 7,FSH 2.5,LH 1.0,SHBG 37  2/16/16 will try androgel 20.25 mg apply two per day  6/16/16 off androgel     Dyslipidemia  9/08 chol 213, trig 119, hdl 49, ldl 140  5/09 chol 171,trig 118,hdl 51,ldl 96  9/10 hold pravachol  3/11 chol 247,trig 143,hdl 51,ldl 167   3/11 start  lipitor samples  5/11 need to try crestor per insurance, trial of crestor 10 mg  10/14/11 on lipitor 20 mg insurance cleared  10/24/11 chol 203,trig 102,hdl 52,ldl 131 off med; start lipitor 20 mg  12/12 chol 167,trig 114,hdl 53,ldl 91 on lipitor 20 mg  12/2/13 chol 199,trig 167,hdl 52,ldl 114 on lipitor 20 mg  10/10/14 chol 198,trig 140,hdl 55,ldl 115 on lipitor 20 mg  6/3/15 chol 162,trig 159,hdl 50,ldl 80 on lipitor 20 mg  12/29/15 off lipitor  1/6/16 chol 233,trig 147,hdl 52,ldl 142 off lipitor  1/7/16 start pravachol 20 mg  2/12/16 chol 190,trig 134,hdl 46,ldl 117 on pravachol 20 mg  6/21/16 chol 184,trig 131,hdl 54,ldl 104 on pravachol 20 mg  4/6/17 chol 190,trig 147,hdl 51,ldl 110 on pravachol 20 mg  12/27/17 chol 215,trig 201,hdl 53,ldl 122 on pravachol 20 mg  6/7/19 chol 200,trig 195,hdl 50,ldl 111 on pravachol 20 mg  (lipitor cause muscle pain)     cervical pain  2/11/16 spine [place] note evaluation cervical pain, x-rays cervical spine from february 2016 showed lordosis C4-C6 and DJD, slight C4-C5 spondylolisthesis, will obtain cervical flexion and extension x-rays, continue naprosyn and robaxin as needed, start physical therapy, follow-up 6 weeks if no improvement consider MRI     BPH  5/28/15 start Flomax  12/29/15 start cialis 5 mg daily   4/6/17 psa 2.8  6/7/19 psa 2.1                Patient Active Problem List   Diagnosis   • Hypertension   • Morbid obesity (HCC)   • Status post total left knee replacement   • Preventative health care   • Dyslipidemia   • Low back pain   • Renal cyst   • Impaired fasting glucose   • BPH (benign prostatic hypertrophy)   • Hypogonadism male   • Cervical pain   • Knee pain     Depression Screening    Little interest or pleasure in doing things?  0 - not at all  Feeling down, depressed , or hopeless? 0 - not at all  Patient Health Questionnaire Score: 0           Health Maintenance Summary                IMM ZOSTER VACCINES Overdue 12/4/2014      Done 10/9/2014 Imm Admin:  Zoster Vaccine Live (ZVL) (Zostavax)    IMM PNEUMOCOCCAL VACCINE: 65+ Years Overdue 6/6/2020      Done 6/6/2019 Imm Admin: Pneumococcal Conjugate Vaccine (Prevnar/PCV-13)    IMM INFLUENZA Next Due 9/1/2020      Done 11/13/2018 Imm Admin: Influenza Vaccine Quad Inj (Pf)     Patient has more history with this topic...    COLONOSCOPY Next Due 12/19/2022      Patient Declined 12/19/2012 pt did not contact [facility]    IMM DTaP/Tdap/Td Vaccine Next Due 5/28/2025      Done 5/28/2015 Imm Admin: Tdap Vaccine     Patient has more history with this topic...          Patient Care Team:  Phil Kohler M.D. as PCP - General      ROS       Objective:          Physical Exam  Vitals signs and nursing note reviewed.   Constitutional:       Appearance: Normal appearance.   HENT:      Head: Normocephalic and atraumatic.      Right Ear: Tympanic membrane and external ear normal.      Left Ear: Tympanic membrane and external ear normal.      Mouth/Throat:      Pharynx: No oropharyngeal exudate.   Eyes:      General:         Right eye: No discharge.         Left eye: No discharge.   Neck:      Musculoskeletal: Neck supple.   Cardiovascular:      Rate and Rhythm: Normal rate and regular rhythm.      Heart sounds: Normal heart sounds. No murmur.   Pulmonary:      Effort: No respiratory distress.      Breath sounds: Normal breath sounds.   Abdominal:      General: There is no distension.   Musculoskeletal:         General: No swelling.   Skin:     General: Skin is warm.   Neurological:      General: No focal deficit present.      Mental Status: He is alert.   Psychiatric:         Mood and Affect: Mood normal.         Behavior: Behavior normal.       Prostate mildly enlarged, no nodules          Assessment/Plan:     Assessment  #! Annual exam    #2 hypertension blood pressure stable at home running 123-132 over 70s on Diovan HCT slightly elevated today but readings at home have been normal    #3 dyslipidemia on pravastatin    #4 bph tried  flomax could not tolerate medication    #5  Morbid obesity BMI 45.89    #6 arthritis, back, hip, knees, takes occasional naproxen with benefit but that does cause GI upset    #7 preventative health  10/9/14 zoster vaccine  5/28/15 tdap  12/31/17 stool tests negative  1/24/18 [facility] note schedule screening colonoscopy  6/5/19 prevnar  6/7/19 vit d 26 increase to 4000 units   6/7/19 psa 2.1  7/2/20 pneumovax     #8 occasional nocturia    #9 vitamin D deficiency      Plan  #! Weight management referral morbid obesity BMI 45.89 with risk factors of hypertension, dyslipidemia, impaired glucose metabolism, previous testing for sleep apnea negative importance of weight loss emphasized as obesity increases risk for diabetes and heart disease    #2 celebrex 100 mg daily as needed for arthritis pains    #3 labs    #4 colon cancer screening [facility] referral    #5 pneumovax     #6 shingrix vaccine to get at pharmacy     #7 health maintenance reviewed and updated    #8 follow-up 6 months    #9 continue practice social distancing measures and wearing a mask in public with COVID pandemic    #10 continue check blood pressures and record    #11 trial of doxazosin since intolerant to Flomax can cause lightheadedness, dizziness, orthostatic precautions

## 2020-07-16 ENCOUNTER — HOSPITAL ENCOUNTER (OUTPATIENT)
Dept: LAB | Facility: MEDICAL CENTER | Age: 65
End: 2020-07-16
Attending: INTERNAL MEDICINE
Payer: COMMERCIAL

## 2020-07-16 DIAGNOSIS — Z00.00 PREVENTATIVE HEALTH CARE: Chronic | ICD-10-CM

## 2020-07-16 DIAGNOSIS — I10 ESSENTIAL HYPERTENSION: Chronic | ICD-10-CM

## 2020-07-16 DIAGNOSIS — Z12.5 PROSTATE CANCER SCREENING: ICD-10-CM

## 2020-07-16 DIAGNOSIS — E78.5 DYSLIPIDEMIA: Chronic | ICD-10-CM

## 2020-07-16 DIAGNOSIS — R73.09 IMPAIRED GLUCOSE METABOLISM: ICD-10-CM

## 2020-07-16 DIAGNOSIS — E55.9 VITAMIN D DEFICIENCY: ICD-10-CM

## 2020-07-16 DIAGNOSIS — Z20.822 EXPOSURE TO COVID-19 VIRUS: ICD-10-CM

## 2020-07-16 LAB
25(OH)D3 SERPL-MCNC: 66 NG/ML (ref 30–100)
ALBUMIN SERPL BCP-MCNC: 4.2 G/DL (ref 3.2–4.9)
ALBUMIN/GLOB SERPL: 1.4 G/DL
ALP SERPL-CCNC: 51 U/L (ref 30–99)
ALT SERPL-CCNC: 22 U/L (ref 2–50)
ANION GAP SERPL CALC-SCNC: 12 MMOL/L (ref 7–16)
APPEARANCE UR: CLEAR
AST SERPL-CCNC: 17 U/L (ref 12–45)
BASOPHILS # BLD AUTO: 1 % (ref 0–1.8)
BASOPHILS # BLD: 0.09 K/UL (ref 0–0.12)
BILIRUB SERPL-MCNC: 0.4 MG/DL (ref 0.1–1.5)
BILIRUB UR QL STRIP.AUTO: NEGATIVE
BUN SERPL-MCNC: 12 MG/DL (ref 8–22)
CALCIUM SERPL-MCNC: 9.5 MG/DL (ref 8.5–10.5)
CHLORIDE SERPL-SCNC: 103 MMOL/L (ref 96–112)
CHOLEST SERPL-MCNC: 203 MG/DL (ref 100–199)
CO2 SERPL-SCNC: 25 MMOL/L (ref 20–33)
COLOR UR: YELLOW
CREAT SERPL-MCNC: 0.88 MG/DL (ref 0.5–1.4)
CREAT UR-MCNC: 60.01 MG/DL
EOSINOPHIL # BLD AUTO: 0.27 K/UL (ref 0–0.51)
EOSINOPHIL NFR BLD: 2.9 % (ref 0–6.9)
ERYTHROCYTE [DISTWIDTH] IN BLOOD BY AUTOMATED COUNT: 47.6 FL (ref 35.9–50)
EST. AVERAGE GLUCOSE BLD GHB EST-MCNC: 137 MG/DL
FASTING STATUS PATIENT QL REPORTED: NORMAL
GLOBULIN SER CALC-MCNC: 2.9 G/DL (ref 1.9–3.5)
GLUCOSE SERPL-MCNC: 118 MG/DL (ref 65–99)
GLUCOSE UR STRIP.AUTO-MCNC: NEGATIVE MG/DL
HBA1C MFR BLD: 6.4 % (ref 0–5.6)
HCT VFR BLD AUTO: 50.9 % (ref 42–52)
HDLC SERPL-MCNC: 55 MG/DL
HGB BLD-MCNC: 16.4 G/DL (ref 14–18)
IMM GRANULOCYTES # BLD AUTO: 0.03 K/UL (ref 0–0.11)
IMM GRANULOCYTES NFR BLD AUTO: 0.3 % (ref 0–0.9)
KETONES UR STRIP.AUTO-MCNC: NEGATIVE MG/DL
LDLC SERPL CALC-MCNC: 125 MG/DL
LEUKOCYTE ESTERASE UR QL STRIP.AUTO: NEGATIVE
LYMPHOCYTES # BLD AUTO: 2.74 K/UL (ref 1–4.8)
LYMPHOCYTES NFR BLD: 29.5 % (ref 22–41)
MCH RBC QN AUTO: 32 PG (ref 27–33)
MCHC RBC AUTO-ENTMCNC: 32.2 G/DL (ref 33.7–35.3)
MCV RBC AUTO: 99.4 FL (ref 81.4–97.8)
MICRO URNS: NORMAL
MICROALBUMIN UR-MCNC: <1.2 MG/DL
MICROALBUMIN/CREAT UR: NORMAL MG/G (ref 0–30)
MONOCYTES # BLD AUTO: 0.73 K/UL (ref 0–0.85)
MONOCYTES NFR BLD AUTO: 7.8 % (ref 0–13.4)
NEUTROPHILS # BLD AUTO: 5.44 K/UL (ref 1.82–7.42)
NEUTROPHILS NFR BLD: 58.5 % (ref 44–72)
NITRITE UR QL STRIP.AUTO: NEGATIVE
NRBC # BLD AUTO: 0 K/UL
NRBC BLD-RTO: 0 /100 WBC
PH UR STRIP.AUTO: 7 [PH] (ref 5–8)
PLATELET # BLD AUTO: 266 K/UL (ref 164–446)
PMV BLD AUTO: 11.1 FL (ref 9–12.9)
POTASSIUM SERPL-SCNC: 4.2 MMOL/L (ref 3.6–5.5)
PROT SERPL-MCNC: 7.1 G/DL (ref 6–8.2)
PROT UR QL STRIP: NEGATIVE MG/DL
PSA SERPL-MCNC: 2.39 NG/ML (ref 0–4)
RBC # BLD AUTO: 5.12 M/UL (ref 4.7–6.1)
RBC UR QL AUTO: NEGATIVE
SARS-COV-2 AB SERPL QL IA: NORMAL
SODIUM SERPL-SCNC: 140 MMOL/L (ref 135–145)
SP GR UR STRIP.AUTO: 1.02
TRIGL SERPL-MCNC: 115 MG/DL (ref 0–149)
TSH SERPL DL<=0.005 MIU/L-ACNC: 2.36 UIU/ML (ref 0.38–5.33)
UROBILINOGEN UR STRIP.AUTO-MCNC: 0.2 MG/DL
WBC # BLD AUTO: 9.3 K/UL (ref 4.8–10.8)

## 2020-07-16 PROCEDURE — 82043 UR ALBUMIN QUANTITATIVE: CPT

## 2020-07-16 PROCEDURE — 81003 URINALYSIS AUTO W/O SCOPE: CPT

## 2020-07-16 PROCEDURE — 36415 COLL VENOUS BLD VENIPUNCTURE: CPT

## 2020-07-16 PROCEDURE — 85025 COMPLETE CBC W/AUTO DIFF WBC: CPT

## 2020-07-16 PROCEDURE — 83036 HEMOGLOBIN GLYCOSYLATED A1C: CPT

## 2020-07-16 PROCEDURE — 80061 LIPID PANEL: CPT

## 2020-07-16 PROCEDURE — 82306 VITAMIN D 25 HYDROXY: CPT

## 2020-07-16 PROCEDURE — 86769 SARS-COV-2 COVID-19 ANTIBODY: CPT

## 2020-07-16 PROCEDURE — 80053 COMPREHEN METABOLIC PANEL: CPT

## 2020-07-16 PROCEDURE — 82570 ASSAY OF URINE CREATININE: CPT

## 2020-07-16 PROCEDURE — 84153 ASSAY OF PSA TOTAL: CPT

## 2020-07-16 PROCEDURE — 84443 ASSAY THYROID STIM HORMONE: CPT

## 2020-07-17 ENCOUNTER — TELEPHONE (OUTPATIENT)
Dept: MEDICAL GROUP | Facility: MEDICAL CENTER | Age: 65
End: 2020-07-17

## 2020-07-17 DIAGNOSIS — E78.5 DYSLIPIDEMIA: Chronic | ICD-10-CM

## 2020-07-17 DIAGNOSIS — E11.9 DIABETES MELLITUS WITHOUT COMPLICATION (HCC): ICD-10-CM

## 2020-07-17 DIAGNOSIS — R73.01 IMPAIRED FASTING GLUCOSE: Chronic | ICD-10-CM

## 2020-07-17 RX ORDER — LANCETS 30 GAUGE
EACH MISCELLANEOUS
Qty: 100 EACH | Refills: 11 | Status: SHIPPED | OUTPATIENT
Start: 2020-07-17 | End: 2021-09-14 | Stop reason: SDUPTHER

## 2020-07-17 RX ORDER — ROSUVASTATIN CALCIUM 10 MG/1
10 TABLET, COATED ORAL EVERY EVENING
Qty: 90 TAB | Refills: 0 | Status: SHIPPED | OUTPATIENT
Start: 2020-07-17 | End: 2021-01-19

## 2020-07-17 RX ORDER — METFORMIN HYDROCHLORIDE 750 MG/1
750 TABLET, EXTENDED RELEASE ORAL DAILY
Qty: 30 TAB | Refills: 3 | Status: SHIPPED | OUTPATIENT
Start: 2020-07-17 | End: 2021-01-19

## 2020-07-17 NOTE — TELEPHONE ENCOUNTER
Notified with labs, A1c increasing given multiple risk factors, recommend start metformin 750 mg daily can cause bloating, gas, loose stools, prescription to pharmacy, also order for glucometer, strips, lancets to pharmacy, he can bring the kit here and we will instruct him on usage checking blood sugar once a day before breakfast, change pravastatin to Crestor, can cause muscle pain, muscle aches, aim for LDL reduction less than 100, needs to work on exercise, nutrition, weight loss, keep follow-up appointment.  He understands and agrees.

## 2020-08-20 DIAGNOSIS — E78.5 DYSLIPIDEMIA: ICD-10-CM

## 2020-08-20 RX ORDER — PRAVASTATIN SODIUM 20 MG
TABLET ORAL
Qty: 90 TAB | Refills: 0 | OUTPATIENT
Start: 2020-08-20

## 2020-11-30 ENCOUNTER — APPOINTMENT (OUTPATIENT)
Dept: MEDICAL GROUP | Facility: MEDICAL CENTER | Age: 65
End: 2020-11-30

## 2020-12-01 ENCOUNTER — PATIENT OUTREACH (OUTPATIENT)
Dept: HEALTH INFORMATION MANAGEMENT | Facility: OTHER | Age: 65
End: 2020-12-01

## 2020-12-02 NOTE — PROGRESS NOTES
Welcome Call for [facility]    Outcome:   Left Message Please transfer to Patient Outreach Team at [phone] when patient returns call.     Attempt # 1  [id number]

## 2020-12-04 DIAGNOSIS — I10 HYPERTENSION, UNSPECIFIED TYPE: ICD-10-CM

## 2020-12-04 RX ORDER — VALSARTAN AND HYDROCHLOROTHIAZIDE 160; 25 MG/1; MG/1
1 TABLET ORAL DAILY
Qty: 30 TAB | Refills: 2 | Status: SHIPPED | OUTPATIENT
Start: 2020-12-04 | End: 2021-01-19 | Stop reason: SDUPTHER

## 2020-12-04 RX ORDER — PRAVASTATIN SODIUM 20 MG
20 TABLET ORAL DAILY
Qty: 30 TAB | Refills: 2 | Status: SHIPPED | OUTPATIENT
Start: 2020-12-04 | End: 2021-01-19 | Stop reason: SDUPTHER

## 2020-12-04 RX ORDER — VALSARTAN AND HYDROCHLOROTHIAZIDE 160; 25 MG/1; MG/1
1 TABLET ORAL DAILY
Qty: 90 TAB | Refills: 0 | Status: SHIPPED | OUTPATIENT
Start: 2020-12-04 | End: 2020-12-04 | Stop reason: SDUPTHER

## 2020-12-04 RX ORDER — PRAVASTATIN SODIUM 20 MG
TABLET ORAL
COMMUNITY
End: 2020-12-04 | Stop reason: SDUPTHER

## 2020-12-31 ENCOUNTER — PATIENT OUTREACH (OUTPATIENT)
Dept: MEDICAL GROUP | Facility: MEDICAL CENTER | Age: 65
End: 2020-12-31

## 2020-12-31 NOTE — PROGRESS NOTES
Left message for patient to call back regarding pre-visit planning. Please transfer call to Tiffanie at [phone] or Grecia at [phone].    Patient did not have a voicemail box set up. No message left

## 2021-01-06 NOTE — PROGRESS NOTES
ESTABLISHED PATIENT PRE-VISIT PLANNING     Patient was NOT contacted to complete PVP.     Note: Patient will not be contacted if there is no indication to call.     1.  Reviewed notes from the last few office visits within the medical group: Yes    2.  If any orders were placed at last visit or intended to be done for this visit (i.e. 6 mos follow-up), do we have Results/Consult Notes?         •  Labs - Labs ordered, completed on 07/16/2020 and results are in chart.  Note: If patient appointment is for lab review and patient did not complete labs, check with provider if OK to reschedule patient until labs completed.       •  Imaging - Imaging was not ordered at last office visit.       •  Referrals - Referral ordered, patient has NOT been seen.    3. Is this appointment scheduled as a Hospital Follow-Up? No    4.  Immunizations were updated in Epic using Reconcile Outside Information activity? Yes    5.  Patient is due for the following Health Maintenance Topics:   Health Maintenance Due   Topic Date Due   • IMM ZOSTER VACCINES (2 of 3) 12/04/2014   • IMM INFLUENZA (1) 09/01/2020           6.  AHA (Pulse8) form printed for Provider? N/A

## 2021-01-12 ENCOUNTER — TELEPHONE (OUTPATIENT)
Dept: MEDICAL GROUP | Facility: MEDICAL CENTER | Age: 66
End: 2021-01-12

## 2021-01-19 ENCOUNTER — TELEMEDICINE (OUTPATIENT)
Dept: MEDICAL GROUP | Facility: MEDICAL CENTER | Age: 66
End: 2021-01-19
Payer: MEDICARE

## 2021-01-19 VITALS
SYSTOLIC BLOOD PRESSURE: 122 MMHG | HEART RATE: 72 BPM | HEIGHT: 71 IN | WEIGHT: 309 LBS | BODY MASS INDEX: 43.26 KG/M2 | DIASTOLIC BLOOD PRESSURE: 71 MMHG

## 2021-01-19 DIAGNOSIS — R73.01 IMPAIRED FASTING GLUCOSE: Chronic | ICD-10-CM

## 2021-01-19 DIAGNOSIS — E78.5 DYSLIPIDEMIA: Chronic | ICD-10-CM

## 2021-01-19 DIAGNOSIS — E66.01 MORBID OBESITY (HCC): Chronic | ICD-10-CM

## 2021-01-19 DIAGNOSIS — I10 HYPERTENSION, UNSPECIFIED TYPE: ICD-10-CM

## 2021-01-19 DIAGNOSIS — Z12.11 COLON CANCER SCREENING: ICD-10-CM

## 2021-01-19 DIAGNOSIS — I10 ESSENTIAL HYPERTENSION: Chronic | ICD-10-CM

## 2021-01-19 PROCEDURE — 99214 OFFICE O/P EST MOD 30 MIN: CPT | Mod: 95,CR | Performed by: INTERNAL MEDICINE

## 2021-01-19 RX ORDER — PRAVASTATIN SODIUM 20 MG
20 TABLET ORAL DAILY
Qty: 90 TAB | Refills: 3 | Status: SHIPPED | OUTPATIENT
Start: 2021-01-19 | End: 2022-03-07

## 2021-01-19 RX ORDER — AZELASTINE 1 MG/ML
1 SPRAY, METERED NASAL 2 TIMES DAILY
Qty: 90 ML | Refills: 3 | Status: SHIPPED | OUTPATIENT
Start: 2021-01-19 | End: 2022-01-14 | Stop reason: SDUPTHER

## 2021-01-19 RX ORDER — AZELASTINE 1 MG/ML
1 SPRAY, METERED NASAL 2 TIMES DAILY
Qty: 90 ML | Refills: 3 | Status: SHIPPED | OUTPATIENT
Start: 2021-01-19 | End: 2021-01-19 | Stop reason: SDUPTHER

## 2021-01-19 RX ORDER — VALSARTAN AND HYDROCHLOROTHIAZIDE 160; 25 MG/1; MG/1
1 TABLET ORAL DAILY
Qty: 90 TAB | Refills: 3 | Status: SHIPPED | OUTPATIENT
Start: 2021-01-19 | End: 2021-05-31 | Stop reason: SDUPTHER

## 2021-01-19 ASSESSMENT — FIBROSIS 4 INDEX: FIB4 SCORE: 0.91

## 2021-01-19 NOTE — PROGRESS NOTES
Subjective:        Virtual Visit: Established Patient   This visit was conducted via zoom using secure and encrypted videoconferencing technology. The patient was in a private location in the state of [place]  The patient's identity was confirmed and verbal consent was obtained for this virtual visit.    Subjective:   CC: diabetes  Darci Davis is a 65 y.o. male presenting for evaluation and management of:  Here follow up diabetes, has retired in August from [employer], did not start metformin due to insurance checking blood sugars daily has been running at home 97 to 120, blood pressures running at home 115-120s/60s stable on 160/25, could not tolerate crestor so back on pravastatin. Has lost 20 lbs with walking and keeping active at home and in the yard. Eats healthier at home since retired, has stopped soda intake, drinks water daily.  He has lost weight because of his good efforts and with the help of his wife's good cooking  No tobacco  Has had more sinus congestion and post nasal drip, no cough, no fever, no covid exposures, staying at home, has groceries delivered.          Allergies   Allergen Reactions   • Ace Inhibitors    • Latex Rash     .   • Pcn [Penicillins] Rash     .   • Zocor [Simvastatin]        Current medicines (including changes today)  Current Outpatient Medications   Medication Sig Dispense Refill   • pravastatin (PRAVACHOL) 20 MG Tab Take 1 Tab by mouth every day. 30 Tab 2   • valsartan-hydrochlorothiazide (DIOVAN-HCT) 160-25 MG per tablet Take 1 Tab by mouth every day. 30 Tab 2   • rosuvastatin (CRESTOR) 10 MG Tab Take 1 Tab by mouth every evening. 90 Tab 0   • metFORMIN ER (GLUCOPHAGE XR) 750 MG TABLET SR 24 HR Take 1 Tab by mouth every day. 30 Tab 3   • Blood Glucose Monitoring Suppl Device Freestyle lite meter, check blood sugar once a day before breakfast, E11.9 1 Each 0   • Blood Glucose Test Strips Freestyle lite meter test strips, check blood sugar once a day before breakfast,  E11.9 50 Each 11   • Lancets Freestyle lite meter lancets, check blood sugar once a day before breakfast, E11.9 100 Each 11   • doxazosin (CARDURA) 2 MG Tab Take 1 Tab by mouth every evening. Indications: Prostate 30 Tab 2   • celecoxib (CELEBREX) 100 MG Cap Take 1 Cap by mouth 1 time daily as needed for Moderate Pain (arthritis). Indications: Arthritis 30 Cap 2   • pravastatin (PRAVACHOL) 20 MG Tab TAKE 1 TABLET BY MOUTH EVERY DAY 90 Tab 0   • Probiotic Product (PRO-BIOTIC BLEND PO) Take  by mouth.     • Cholecalciferol (VITAMIN D) 2000 UNITS Cap Take  by mouth every day.       No current facility-administered medications for this visit.      Status post total knee replacement  6/08  right meniscus repair  5/11 MRI left knee medial meniscus tear, moderate chondral thinning, chondromalacia  6/11  ortho left knee medial and lateral meniscectomy  7/12  left knee injection  12/12 now sees  ortho  1/13  ortho note, injection left knee  5/21/13  ortho note, injection left knee  3/31/15  ortho note; left knee injection steroid  5/29/15  ortho note knee arthritis,second injection euflexxa left knee  7/11/16  orthopedic operative note, left total knee arthroplasty  1/31/17  orthopedic note 6 months status post left total knee, x-ray left knee 3 view good component position and alignment, follow-up 6 months repeat x-rays  8/27/19  orthopedic note injury right knee, x-ray loss of medial joint space findings consistent with lateral meniscus tear, obtain MRI follow-up after imaging completed     Renal cyst  12/9/13 ultrasound abdomen left renal 18 mm hypoechoic mass most likely cysts, MRI or CT to clarify  12/13/13 CT abd/pelvis with and without; 8 mm simple appearing cyst in the lower pole the left kidney which is discordant in size with the recent ultrasound. Followup ultrasound surveillance or MRI examination may be of value to  further study the left kidney,11 mm in diameter simple cyst in the midpole region of the right kidney  11/17/14 ultrasound renal kidneys are poorly visualized due to poor sonographic penetration of the current exam,no focal renal cyst or mass is identified in the left kidney as seen on prior imaging studies at this time     Preventative health  10/9/14 zoster vaccine  5/28/15 tdap  1/24/18 [facility] note schedule screening colonoscopy  6/5/19 prevnar  7/2/20 pneumovax   7/16/20 covid igg negative  7/16/20 vit d 66 on 4000 units  7/16/20 psa 2.3     Morbid obesity  7/2/13 declines bariatric eval  7/12/13 apnea link AHI 2, desaturation <89% for 27 min, total sleep time 6 hours 34 min  12/2/13  referral bariatric surgery  10/9/14 BMI 44.3; referral to  weight loss bariatric  5/28/15 BMI 46.3 has seen  and declines bariatric surgery, trial orlistat  12/29/15 BMI 46.3   2/4/16 patient called, insurance does not cover bariatric surgery  6/16/16 BMI 45.2  9/16/16 BMI 42.8   3/6/17 BMI 44.3 insurance not cover bariatric, declines nutrition consultation; 20-30 minutes bike per day     low back pain  12/12 x-ray hip bilateral mild DJD  12/12 x-ray lumbar spine multilevel DJD and arthropathy, mild to moderate dextroconvex scoliosis     knee pain  8/27/19  orthopedic note injury right knee, x-ray loss of medial joint space findings consistent with lateral meniscus tear, obtain MRI follow-up after imaging completed  10/1/19  orthopedic evaluation right knee pain, MRI right knee shows medial and patellofemoral degenerative joint changes options discussed conservative treatment versus right arthroscopy, patient elects to proceed with arthroscopy right knee     Impaired fasting blood sugar  10/10/14 bs 114  6/3/15 A1c 5.9%,bs 94  1/6/16 bs 104,A1c 6.1%  6/21/16 A1c 5.8%  4/6/17 bs 104  12/27/17 A1c 5.8%  6/7/19 A1c 6%  7/16/20 A1c 6.4%  7/17/20 start metformin  mg, order for freestyle lite  meter and strips to pharmacy, patient will bring here for instruction     Hypertension  9/08 urine mac 9, intolerant ace cough  Was on diovan hct 320/12.5 caused lightheadedness, taking half pill a day  10/14/11 on diovan 320/12.5 mg  11/3/11 change to diovan 320 mg, combo dose caused fatigue  12/6/12 change to diovan 160/25 mg daily  12/12 urine mac 4 on diovan hct 160/25 mg  12/2/13 urine mac <0.5 on diovan hct 160/25 mg  10/10/14 urine mac 6 on diovan 160/25 mg  6/3/15 urine mac <0.5 on diovan 160/25 mg  1/6/16 urine mac <0.5 on diovan hct 160/25 mg  4/6/17 urine mac <0.7 on diovan hct 160/25 mg  5/16/18 ultrasound carotid negative   7/16/20 urine mac<1.2 on diovan hct 160/25 mg      Hypogonadism  10/10/14 testosterone 201,free testosterone 49  1/6/16 testosterone 150,free testosterone 28  2/12/16 testosterone 179,prolactin 7,FSH 2.5,LH 1.0,SHBG 37  2/16/16 will try androgel 20.25 mg apply two per day  6/16/16 off androgel     Dyslipidemia  9/08 chol 213, trig 119, hdl 49, ldl 140  5/09 chol 171,trig 118,hdl 51,ldl 96  9/10 hold pravachol  3/11 chol 247,trig 143,hdl 51,ldl 167   3/11 start lipitor samples  5/11 need to try crestor per insurance, trial of crestor 10 mg  10/14/11 on lipitor 20 mg insurance cleared  10/24/11 chol 203,trig 102,hdl 52,ldl 131 off med; start lipitor 20 mg  12/12 chol 167,trig 114,hdl 53,ldl 91 on lipitor 20 mg  12/2/13 chol 199,trig 167,hdl 52,ldl 114 on lipitor 20 mg  10/10/14 chol 198,trig 140,hdl 55,ldl 115 on lipitor 20 mg  6/3/15 chol 162,trig 159,hdl 50,ldl 80 on lipitor 20 mg  12/29/15 off lipitor  1/6/16 chol 233,trig 147,hdl 52,ldl 142 off lipitor  1/7/16 start pravachol 20 mg  2/12/16 chol 190,trig 134,hdl 46,ldl 117 on pravachol 20 mg  6/21/16 chol 184,trig 131,hdl 54,ldl 104 on pravachol 20 mg  4/6/17 chol 190,trig 147,hdl 51,ldl 110 on pravachol 20 mg  12/27/17 chol 215,trig 201,hdl 53,ldl 122 on pravachol 20 mg  6/7/19 chol 200,trig 195,hdl 50,ldl 111 on pravachol 20  "mg  7/16/20 chol 203,trig 115,hdl 55,ldl 125 on pravachol 20 mg  7/17/20 change to crestor 10 mg  (lipitor cause muscle pain)     cervical pain  2/11/16 [place] note evaluation cervical pain, x-rays cervical spine from february 2016 showed lordosis C4-C6 and DJD, slight C4-C5 spondylolisthesis, will obtain cervical flexion and extension x-rays, continue naprosyn and robaxin as needed, start physical therapy, follow-up 6 weeks if no improvement consider MRI     BPH  5/28/15 start Flomax  12/29/15 start cialis 5 mg daily   4/6/17 psa 2.8  6/7/19 psa 2.1  7/2/20 could not tolerate flomax, trial doxazosin 2 mg  7/16/20 psa 2.3          Patient Active Problem List    Diagnosis Date Noted   • Knee pain 10/04/2019   • Cervical pain 02/20/2016   • Hypogonadism male 01/06/2016   • BPH (benign prostatic hypertrophy) 05/28/2015   • Impaired fasting glucose 10/10/2014   • Renal cyst 12/09/2013   • Low back pain 12/10/2012   • Hypertension 05/04/2009   • Morbid obesity (HCC) 05/04/2009   • S/p total left knee replacement 05/04/2009   • Preventative health care 05/04/2009   • Dyslipidemia 05/04/2009       Family History   Problem Relation Age of Onset   • Cancer Brother         lung cancer       He  has a past medical history of Accessory skin tags (8/20/2018), Allergic rhinitis (5/4/2009), Anesthesia, Degenerative arthritis of knee (5/4/2009), Dental disorder, Dyslipidemia (5/4/2009), High cholesterol, Hypertension (5/4/2009), Morbid obesity (HCC) (5/4/2009), Plantar fasciitis (5/4/2009), Preventative health care (5/4/2009), S/P lateral meniscectomy of right knee (5/4/2009), Seborrheic keratoses (8/20/2018), and Sleep apnea (5/4/2009). He also has no past medical history of COPD.  He  has a past surgical history that includes knee arthroscopy (Right, 6/2008); knee arthroscopy (Left, 6/2010); and knee arthroplasty total (Left, 7/11/2016).       Objective:   /71   Pulse 72   Ht 1.803 m (5' 11\")   Wt (!) 140.2 kg (309 " lb)   BMI 43.10 kg/m²     Physical Exam:   Constitutional: Alert, no distress, well-groomed.  Skin: No rashes in visible areas.  Eye: Round. Conjunctiva clear, lids normal. No icterus.   ENMT: Lips pink without lesions, good dentition, moist mucous membranes. Phonation normal.  Neck: No masses, no thyromegaly. Moves freely without pain.  Respiratory: Unlabored respiratory effort, no cough or audible wheeze  Psych: Alert and oriented x3, normal affect and mood.       Assessment and Plan:   The following treatment plan was discussed:   Assessment  #1 diabetes stable and controlled intolerant of metformin, blood sugars appear improved with diet and exercise but he is retired which has helped as he is more active outdoors and is eating healthier    #2 hypertension stable on diovan hct    #3 dyslipidemia not tolerate crestor back on pravastatin    #4 BMI 43.1 has lost 20 pounds since [facility]    #5 postnasal drip      Plan  #1 labs ordered    #2 continue to check blood sugars daily off metformin    #3 continue to check blood pressures on Diovan HCT    #4  Continue pravastatin recheck lipids    #5 continue work on weight loss, diet, activity    #6 continue check with [facility] about Covid vaccine updates, continue social distancing otherwise and Covid practicing guidelines    #7 check with his insurance regarding his pharmacy of choice    #8 trial of Astelin for postnasal drip

## 2021-02-18 ENCOUNTER — HOSPITAL ENCOUNTER (OUTPATIENT)
Dept: LAB | Facility: MEDICAL CENTER | Age: 66
End: 2021-02-18
Attending: INTERNAL MEDICINE
Payer: MEDICARE

## 2021-02-18 ENCOUNTER — TELEPHONE (OUTPATIENT)
Dept: MEDICAL GROUP | Facility: MEDICAL CENTER | Age: 66
End: 2021-02-18

## 2021-02-18 DIAGNOSIS — I10 ESSENTIAL HYPERTENSION: Chronic | ICD-10-CM

## 2021-02-18 DIAGNOSIS — E78.5 DYSLIPIDEMIA: Chronic | ICD-10-CM

## 2021-02-18 DIAGNOSIS — R73.01 IMPAIRED FASTING GLUCOSE: Chronic | ICD-10-CM

## 2021-02-18 LAB
ALBUMIN SERPL BCP-MCNC: 4 G/DL (ref 3.2–4.9)
ALBUMIN/GLOB SERPL: 1.1 G/DL
ALP SERPL-CCNC: 65 U/L (ref 30–99)
ALT SERPL-CCNC: 17 U/L (ref 2–50)
ANION GAP SERPL CALC-SCNC: 15 MMOL/L (ref 7–16)
AST SERPL-CCNC: 18 U/L (ref 12–45)
BILIRUB SERPL-MCNC: 0.5 MG/DL (ref 0.1–1.5)
BUN SERPL-MCNC: 18 MG/DL (ref 8–22)
CALCIUM SERPL-MCNC: 9.9 MG/DL (ref 8.5–10.5)
CHLORIDE SERPL-SCNC: 100 MMOL/L (ref 96–112)
CHOLEST SERPL-MCNC: 211 MG/DL (ref 100–199)
CO2 SERPL-SCNC: 24 MMOL/L (ref 20–33)
CREAT SERPL-MCNC: 0.6 MG/DL (ref 0.5–1.4)
CREAT UR-MCNC: 85.56 MG/DL
EST. AVERAGE GLUCOSE BLD GHB EST-MCNC: 137 MG/DL
FASTING STATUS PATIENT QL REPORTED: NORMAL
GLOBULIN SER CALC-MCNC: 3.7 G/DL (ref 1.9–3.5)
GLUCOSE SERPL-MCNC: 107 MG/DL (ref 65–99)
HBA1C MFR BLD: 6.4 % (ref 0–5.6)
HDLC SERPL-MCNC: 47 MG/DL
LDLC SERPL CALC-MCNC: 129 MG/DL
MICROALBUMIN UR-MCNC: <1.2 MG/DL
MICROALBUMIN/CREAT UR: NORMAL MG/G (ref 0–30)
POTASSIUM SERPL-SCNC: 4.5 MMOL/L (ref 3.6–5.5)
PROT SERPL-MCNC: 7.7 G/DL (ref 6–8.2)
SODIUM SERPL-SCNC: 139 MMOL/L (ref 135–145)
TRIGL SERPL-MCNC: 175 MG/DL (ref 0–149)

## 2021-02-18 PROCEDURE — 80053 COMPREHEN METABOLIC PANEL: CPT

## 2021-02-18 PROCEDURE — 36415 COLL VENOUS BLD VENIPUNCTURE: CPT

## 2021-02-18 PROCEDURE — 80061 LIPID PANEL: CPT

## 2021-02-18 PROCEDURE — 82043 UR ALBUMIN QUANTITATIVE: CPT

## 2021-02-18 PROCEDURE — 82570 ASSAY OF URINE CREATININE: CPT

## 2021-02-18 PROCEDURE — 83036 HEMOGLOBIN GLYCOSYLATED A1C: CPT

## 2021-02-19 NOTE — TELEPHONE ENCOUNTER
Called patient left message, please notify him that his test shows:  (1) his diabetes 90-day A1c test is 6.4% which is unchanged from last time, this is stable without medications, have him continue on a good nutrition and exercise program  (2) his liver function, kidney function, and urine tests are normal  (3) his cholesterol is 211, our goal is less than 200, his bad cholesterol is 129, our goal is less than 100.  Please check to see if he is taking the cholesterol medication?  If so the cholesterol is still high and we may need to try different medication, please let me know  (4) we can repeat his blood test in 6 months

## 2021-02-22 ENCOUNTER — TELEPHONE (OUTPATIENT)
Dept: MEDICAL GROUP | Facility: MEDICAL CENTER | Age: 66
End: 2021-02-22

## 2021-02-22 DIAGNOSIS — E78.5 DYSLIPIDEMIA: Chronic | ICD-10-CM

## 2021-02-22 NOTE — TELEPHONE ENCOUNTER
----- Message from Phil Kohler M.D. sent at 2/18/2021  7:59 PM PST -----  Called patient left message, please notify him that his test shows:  (1) his diabetes 90-day A1c test is 6.4% which is unchanged from last time, this is stable without medications, have him continue on a good nutrition and exercise program  (2) his liver function, kidney function, and urine tests are normal  (3) his cholesterol is 211, our goal is less than 200, his bad cholesterol is 129, our goal is less than 100.  Please check to see if he is taking the cholesterol medication?  If so the cholesterol is still high and we may need to try different medication, please let me know  (4) we can repeat his blood test in 6 months

## 2021-02-23 DIAGNOSIS — E78.5 DYSLIPIDEMIA: ICD-10-CM

## 2021-02-23 RX ORDER — ATORVASTATIN CALCIUM 20 MG/1
20 TABLET, FILM COATED ORAL DAILY
Qty: 30 TABLET | Refills: 2 | Status: SHIPPED | OUTPATIENT
Start: 2021-02-23 | End: 2021-05-03 | Stop reason: SDUPTHER

## 2021-02-23 NOTE — TELEPHONE ENCOUNTER
Pt states that you started him on one about 6 mo ago (could not remember the name) but says it really knocked him down. It looks to me like it was Rosuvastain (7/17/20). Please advise.

## 2021-02-23 NOTE — TELEPHONE ENCOUNTER
He most recently has been on pravastatin, but it does look like he tried rosuvastatin last year.     Would he be willing to try atorvastatin (Lipitor) as he was on that for 4 to 5 years until 2015?    If so let me know I can send a prescription for the Lipitor to his pharmacy.

## 2021-02-23 NOTE — TELEPHONE ENCOUNTER
Thank you for the notification.  Please call the patient and let him know that I would like to change him to rosuvastatin, a different cholesterol medication.     He may have tried this 10 years ago but his insurance did not cover the medication at that time, this is a slightly stronger cholesterol pill, by using this medication I would like to obtain better control of his cholesterol.  The side effects are similar to the pravastatin, all of these medications can cause muscle pain or liver irritation.    If he is fine with trying the rosuvastatin let me know I will send a prescription to his pharmacy.  We will need to recheck this blood test 4 weeks after starting the new medication.

## 2021-02-24 NOTE — TELEPHONE ENCOUNTER
Thank you, the prescription has been sent to [facility], have him discontinue the pravastatin, start the atorvastatin, and we can repeat his labs in 4 weeks those orders are in the computer system fasting.

## 2021-02-24 NOTE — TELEPHONE ENCOUNTER
Pt states that he will give it a try and wants you to send the new Rx to his pharmacy. Pt has been advised that you will also enter the lab order for him to recheck his blood in 4 wks.

## 2021-03-03 ENCOUNTER — TELEPHONE (OUTPATIENT)
Dept: MEDICAL GROUP | Facility: MEDICAL CENTER | Age: 66
End: 2021-03-03

## 2021-03-03 DIAGNOSIS — Z23 NEED FOR VACCINATION: ICD-10-CM

## 2021-03-03 RX ORDER — CELECOXIB 100 MG/1
100 CAPSULE ORAL
Qty: 90 CAPSULE | Refills: 2 | Status: SHIPPED | OUTPATIENT
Start: 2021-03-03 | End: 2021-12-08 | Stop reason: SDUPTHER

## 2021-03-12 ENCOUNTER — IMMUNIZATION (OUTPATIENT)
Dept: FAMILY PLANNING/WOMEN'S HEALTH CLINIC | Facility: IMMUNIZATION CENTER | Age: 66
End: 2021-03-12
Attending: INTERNAL MEDICINE
Payer: MEDICARE

## 2021-03-12 DIAGNOSIS — Z23 NEED FOR VACCINATION: ICD-10-CM

## 2021-03-12 DIAGNOSIS — Z23 ENCOUNTER FOR VACCINATION: Primary | ICD-10-CM

## 2021-03-12 PROCEDURE — 91301 MODERNA SARS-COV-2 VACCINE: CPT

## 2021-03-12 PROCEDURE — 0011A MODERNA SARS-COV-2 VACCINE: CPT

## 2021-04-08 ENCOUNTER — PATIENT MESSAGE (OUTPATIENT)
Dept: HEALTH INFORMATION MANAGEMENT | Facility: OTHER | Age: 66
End: 2021-04-08

## 2021-04-10 ENCOUNTER — IMMUNIZATION (OUTPATIENT)
Dept: FAMILY PLANNING/WOMEN'S HEALTH CLINIC | Facility: IMMUNIZATION CENTER | Age: 66
End: 2021-04-10
Attending: INTERNAL MEDICINE
Payer: MEDICARE

## 2021-04-10 DIAGNOSIS — Z23 ENCOUNTER FOR VACCINATION: Primary | ICD-10-CM

## 2021-04-10 PROCEDURE — 0012A MODERNA SARS-COV-2 VACCINE: CPT

## 2021-04-10 PROCEDURE — 91301 MODERNA SARS-COV-2 VACCINE: CPT

## 2021-04-21 ENCOUNTER — PATIENT OUTREACH (OUTPATIENT)
Dept: HEALTH INFORMATION MANAGEMENT | Facility: OTHER | Age: 66
End: 2021-04-21

## 2021-04-21 NOTE — PROGRESS NOTES
Outcome: Left Message to call back and schedule Comprehensive Health Assessment    Please transfer to Patient Outreach Team at [phone] when patient returns call.    Attempt # 2

## 2021-05-12 NOTE — PROGRESS NOTES
Outcome: Left Message to call back and schedule Comprehensive Health Assessment     Please transfer to Patient Outreach Team at [phone] when patient returns call.     Attempt # 3

## 2021-05-19 PROBLEM — H35.30 MACULAR DEGENERATION: Status: ACTIVE | Noted: 2021-05-19

## 2021-12-17 ENCOUNTER — TELEPHONE (OUTPATIENT)
Dept: MEDICAL GROUP | Facility: MEDICAL CENTER | Age: 66
End: 2021-12-17

## 2021-12-17 DIAGNOSIS — I10 HYPERTENSION, UNSPECIFIED TYPE: ICD-10-CM

## 2021-12-17 RX ORDER — VALSARTAN AND HYDROCHLOROTHIAZIDE 160; 25 MG/1; MG/1
1 TABLET ORAL DAILY
Qty: 90 TABLET | Refills: 0 | Status: SHIPPED | OUTPATIENT
Start: 2021-12-17 | End: 2022-01-14 | Stop reason: SDUPTHER

## 2021-12-17 NOTE — LETTER
December 20, 2021        Darci Davis  [address]  [place] [address]        Dear Darci:    Our records indicate that you are due for your next  in-clinic visit. Please give us a call at [phone] and our scheduling team will assist you with scheduling this appointment.       If you have any questions or concerns, please don't hesitate to call.        Sincerely,        Phil Kohler M.D.    Electronically Signed

## 2021-12-17 NOTE — TELEPHONE ENCOUNTER
Please call the patient, has not been seen in nearly a year, have him schedule follow-up appointment.

## 2022-01-13 ENCOUNTER — TELEPHONE (OUTPATIENT)
Dept: MEDICAL GROUP | Facility: MEDICAL CENTER | Age: 67
End: 2022-01-13

## 2022-01-13 RX ORDER — ATORVASTATIN CALCIUM 20 MG/1
20 TABLET, FILM COATED ORAL DAILY
Qty: 90 TABLET | Refills: 0 | Status: SHIPPED | OUTPATIENT
Start: 2022-01-13 | End: 2022-01-14 | Stop reason: SDUPTHER

## 2022-01-14 ENCOUNTER — PATIENT MESSAGE (OUTPATIENT)
Dept: MEDICAL GROUP | Facility: MEDICAL CENTER | Age: 67
End: 2022-01-14

## 2022-01-14 DIAGNOSIS — I10 HYPERTENSION, UNSPECIFIED TYPE: ICD-10-CM

## 2022-01-14 RX ORDER — CELECOXIB 100 MG/1
100 CAPSULE ORAL
Qty: 90 CAPSULE | Refills: 1 | Status: SHIPPED | OUTPATIENT
Start: 2022-01-14 | End: 2022-04-11

## 2022-01-14 RX ORDER — DOXAZOSIN 2 MG/1
2 TABLET ORAL EVERY EVENING
Qty: 90 TABLET | Refills: 1 | Status: SHIPPED | OUTPATIENT
Start: 2022-01-14 | End: 2022-03-07

## 2022-01-14 RX ORDER — VALSARTAN AND HYDROCHLOROTHIAZIDE 160; 25 MG/1; MG/1
1 TABLET ORAL DAILY
Qty: 90 TABLET | Refills: 1 | Status: SHIPPED | OUTPATIENT
Start: 2022-01-14 | End: 2022-04-06 | Stop reason: SDUPTHER

## 2022-01-14 RX ORDER — ATORVASTATIN CALCIUM 20 MG/1
20 TABLET, FILM COATED ORAL DAILY
Qty: 90 TABLET | Refills: 1 | Status: SHIPPED | OUTPATIENT
Start: 2022-01-14 | End: 2022-01-18 | Stop reason: SDUPTHER

## 2022-01-14 RX ORDER — AZELASTINE 1 MG/ML
1 SPRAY, METERED NASAL 2 TIMES DAILY
Qty: 90 ML | Refills: 1 | Status: SHIPPED | OUTPATIENT
Start: 2022-01-14 | End: 2022-03-07

## 2022-01-14 NOTE — TELEPHONE ENCOUNTER
Please notify the patient that he is due for a follow-up appointment, we have not seen him in a year.

## 2022-01-14 NOTE — TELEPHONE ENCOUNTER
----- Message from Darci Davis sent at 1/14/2022 12:59 PM PST -----  Regarding: Medications  I have changed pharmacy because [facility] has become a problem getting my meds and their customers service when you call are not helpful at all! I would now like prescriptions sent to : [id number]  [address] ,[address]  [place]. [address]  Phone # [phone]

## 2022-02-05 PROBLEM — M25.561 KNEE PAIN, RIGHT: Status: ACTIVE | Noted: 2019-10-04

## 2022-02-22 ENCOUNTER — PATIENT MESSAGE (OUTPATIENT)
Dept: HEALTH INFORMATION MANAGEMENT | Facility: OTHER | Age: 67
End: 2022-02-22
Payer: MEDICARE

## 2022-02-24 ENCOUNTER — TELEPHONE (OUTPATIENT)
Dept: HEALTH INFORMATION MANAGEMENT | Facility: OTHER | Age: 67
End: 2022-02-24
Payer: MEDICARE

## 2022-03-07 ENCOUNTER — OFFICE VISIT (OUTPATIENT)
Dept: MEDICAL GROUP | Facility: MEDICAL CENTER | Age: 67
End: 2022-03-07
Payer: MEDICARE

## 2022-03-07 VITALS
HEIGHT: 71 IN | TEMPERATURE: 98.6 F | OXYGEN SATURATION: 94 % | BODY MASS INDEX: 43.96 KG/M2 | SYSTOLIC BLOOD PRESSURE: 156 MMHG | HEART RATE: 101 BPM | DIASTOLIC BLOOD PRESSURE: 88 MMHG | WEIGHT: 314 LBS

## 2022-03-07 DIAGNOSIS — E66.01 MORBID OBESITY (HCC): ICD-10-CM

## 2022-03-07 DIAGNOSIS — E55.9 VITAMIN D DEFICIENCY: ICD-10-CM

## 2022-03-07 DIAGNOSIS — R73.01 IMPAIRED FASTING GLUCOSE: ICD-10-CM

## 2022-03-07 DIAGNOSIS — Z12.11 COLON CANCER SCREENING: ICD-10-CM

## 2022-03-07 DIAGNOSIS — R05.9 COUGH: ICD-10-CM

## 2022-03-07 DIAGNOSIS — Z00.00 PREVENTATIVE HEALTH CARE: Chronic | ICD-10-CM

## 2022-03-07 DIAGNOSIS — E78.5 DYSLIPIDEMIA: Chronic | ICD-10-CM

## 2022-03-07 DIAGNOSIS — E11.9 DIABETES MELLITUS WITHOUT COMPLICATION (HCC): ICD-10-CM

## 2022-03-07 DIAGNOSIS — H35.3290 EXUDATIVE AGE-RELATED MACULAR DEGENERATION, UNSPECIFIED LATERALITY, UNSPECIFIED STAGE (HCC): ICD-10-CM

## 2022-03-07 DIAGNOSIS — Z12.5 PROSTATE CANCER SCREENING: ICD-10-CM

## 2022-03-07 DIAGNOSIS — I10 PRIMARY HYPERTENSION: ICD-10-CM

## 2022-03-07 PROCEDURE — 99214 OFFICE O/P EST MOD 30 MIN: CPT | Performed by: INTERNAL MEDICINE

## 2022-03-07 RX ORDER — IPRATROPIUM BROMIDE 21 UG/1
2 SPRAY, METERED NASAL EVERY 12 HOURS
Qty: 30 ML | Refills: 6 | Status: SHIPPED | OUTPATIENT
Start: 2022-03-07 | End: 2023-10-09

## 2022-03-07 RX ORDER — SEMAGLUTIDE 1.34 MG/ML
0.25 INJECTION, SOLUTION SUBCUTANEOUS
Qty: 1.5 ML | Refills: 3 | Status: SHIPPED | OUTPATIENT
Start: 2022-03-07 | End: 2022-04-19 | Stop reason: SDUPTHER

## 2022-03-07 ASSESSMENT — PATIENT HEALTH QUESTIONNAIRE - PHQ9: CLINICAL INTERPRETATION OF PHQ2 SCORE: 0

## 2022-03-07 ASSESSMENT — FIBROSIS 4 INDEX: FIB4 SCORE: 1.12

## 2022-03-07 NOTE — PROGRESS NOTES
Subjective     Darci Davis is a 66 y.o. male who presents with follow up blood sugar       HPI     Here for follow up blood sugar and blood pressure, keeps active and does work in yard with animals for two hours in the morning. Last seen july 2020. Since then he has retired. Tries to follow a healthy diet and limits sweets and processed foods. Blood pressure running 120-130/60-80 on diovan hct.  Since he is retired he has been more active and less sedentary and is improved his diet and lost 23 pounds.  He brings his blood pressure and blood sugar readings from this month.  No nausea or abdominal pain with meals, no gerd  Chest congestion since smoke in the area last year, no tobacco, does have post nasal drip tried astelin no benefit, no headache, no fever or sore throat.  No history of asthma or COPD.  No history of recurrent lung infections.  Has macular degeneration and is on injections by the retina specialist, no diabetic eye changes.   No etoh, no tobacco  Occasional ear buzzing but no hearing loss, some postnasal drip, tried Astelin no benefit, no recurrent sinus infections or headache      Current Outpatient Medications   Medication Sig Dispense Refill   • atorvastatin (LIPITOR) 20 MG Tab Take 1 Tablet by mouth every day. 90 Tablet 0   • celecoxib (CELEBREX) 100 MG Cap Take 1 Capsule by mouth 1 time a day as needed for Moderate Pain (arthritis). Indications: Arthritis 90 Capsule 1   • doxazosin (CARDURA) 2 MG Tab Take 1 Tablet by mouth every evening. Indications: Prostate 90 Tablet 1   • valsartan-hydrochlorothiazide (DIOVAN-HCT) 160-25 MG per tablet Take 1 Tablet by mouth every day. 90 Tablet 1   • azelastine (ASTELIN) 137 MCG/SPRAY nasal spray Administer 1 Spray into affected nostril(S) 2 times a day. 90 mL 1   • FREESTYLE LITE strip USE TO CHECK BLOOD SUGAR ONCE DAILY BEFORE BREAKFAST. 50 Strip 11   • Blood Glucose Monitoring Suppl (FREESTYLE LITE) Device E11.9 1 Each 0   • Lancets Freestyle lite  meter lancets, check blood sugar once a day before breakfast, E11.9 100 Each 11   • pravastatin (PRAVACHOL) 20 MG Tab Take 1 Tab by mouth every day. 90 Tab 3   • Probiotic Product (PRO-BIOTIC BLEND PO) Take  by mouth.     • Cholecalciferol (VITAMIN D) 2000 UNITS Cap Take  by mouth every day.       No current facility-administered medications for this visit.                    Status post total knee replacement  6/08  right meniscus repair  5/11 MRI left knee medial meniscus tear, moderate chondral thinning, chondromalacia  6/11  ortho left knee medial and lateral meniscectomy  7/12  left knee injection  12/12 now sees dr.mendez ortho  1/13  ortho note, injection left knee  5/21/13  ortho note, injection left knee  3/31/15  ortho note; left knee injection steroid  5/29/15  ortho note knee arthritis,second injection euflexxa left knee  7/11/16  orthopedic operative note, left total knee arthroplasty  1/31/17  orthopedic note 6 months status post left total knee, x-ray left knee 3 view good component position and alignment, follow-up 6 months repeat x-rays  8/27/19  orthopedic note injury right knee, x-ray loss of medial joint space findings consistent with lateral meniscus tear, obtain MRI follow-up after imaging completed     Renal cyst  12/9/13 ultrasound abdomen left renal 18 mm hypoechoic mass most likely cysts, MRI or CT to clarify  12/13/13 CT abd/pelvis with and without; 8 mm simple appearing cyst in the lower pole the left kidney which is discordant in size with the recent ultrasound. Followup ultrasound surveillance or MRI examination may be of value to further study the left kidney,11 mm in diameter simple cyst in the midpole region of the right kidney  11/17/14 ultrasound renal kidneys are poorly visualized due to poor sonographic penetration of the current exam,no focal renal cyst or mass is identified in the left kidney as  seen on prior imaging studies at this time     Preventative health  10/9/14 zoster vaccine  5/28/15 tdap  6/5/19 prevnar  7/2/20 pneumovax   7/16/20 vit d 66 on 4000 units  7/16/20 psa 2.3  4/10/21 covid moderna second    neck pain  2/11/16 spine [place] note evaluation cervical pain, x-rays cervical spine from february 2016 showed lordosis C4-C6 and DJD, slight C4-C5 spondylolisthesis, will obtain cervical flexion and extension x-rays, continue naprosyn and robaxin as needed, start physical therapy, follow-up 6 weeks if no improvement consider MRI    Macular degeneration  5/17/21   retina note 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, follow-up 3 weeks     Morbid obesity  7/2/13 declines bariatric eval  7/12/13 apnea link AHI 2, desaturation <89% for 27 min, total sleep time 6 hours 34 min  12/2/13  referral bariatric surgery  10/9/14 BMI 44.3; referral to  weight loss bariatric  5/28/15 BMI 46.3 has seen  and declines bariatric surgery, trial orlistat  12/29/15 BMI 46.3   2/4/16 patient called, insurance does not cover bariatric surgery  6/16/16 BMI 45.2  9/16/16 BMI 42.8   3/6/17 BMI 44.3 insurance not cover bariatric, declines nutrition consultation; 20-30 minutes bike per day     low back pain  12/12 x-ray hip bilateral mild DJD  12/12 x-ray lumbar spine multilevel DJD and arthropathy, mild to moderate dextroconvex scoliosis    knee pain right  8/27/19  orthopedic note injury right knee, x-ray loss of medial joint space findings consistent with lateral meniscus tear, obtain MRI follow-up after imaging completed  10/1/19  orthopedic evaluation right knee pain, MRI right knee shows medial and patellofemoral degenerative joint changes options discussed conservative treatment versus right arthroscopy, patient elects to proceed with arthroscopy right knee     Impaired fasting blood sugar  10/10/14 bs 114  6/3/15 A1c 5.9%,bs 94  1/6/16 bs 104,A1c 6.1%  6/21/16 A1c  5.8%  4/6/17 bs 104  12/27/17 A1c 5.8%  6/7/19 A1c 6%  7/16/20 A1c 6.4%  7/17/20 start metformin  mg, order for freestyle lite meter and strips to pharmacy, patient will bring here for instruction  1/19/21 off metformin   2/18/21 A1c 6.4% no meds  5/17/21   retina note 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, follow-up 3 weeks  Intolerant metformin      Hypertension  9/08 urine mac 9, intolerant ace cough  Was on diovan hct 320/12.5 caused lightheadedness, taking half pill a day  10/14/11 on diovan 320/12.5 mg  11/3/11 change to diovan 320 mg, combo dose caused fatigue  12/6/12 change to diovan 160/25 mg daily  12/12 urine mac 4 on diovan hct 160/25 mg  12/2/13 urine mac <0.5 on diovan hct 160/25 mg  10/10/14 urine mac 6 on diovan 160/25 mg  6/3/15 urine mac <0.5 on diovan 160/25 mg  1/6/16 urine mac <0.5 on diovan hct 160/25 mg  4/6/17 urine mac <0.7 on diovan hct 160/25 mg  5/16/18 ultrasound carotid negative   7/16/20 urine mac<1.2 on diovan hct 160/25 mg   2/18/21 urine mac<1.2  on diovan hct 160/25 mg     Dyslipidemia  9/08 chol 213, trig 119, hdl 49, ldl 140  5/09 chol 171,trig 118,hdl 51,ldl 96  9/10 hold pravachol  3/11 chol 247,trig 143,hdl 51,ldl 167   3/11 start lipitor samples  5/11 need to try crestor per insurance, trial of crestor 10 mg  10/14/11 on lipitor 20 mg insurance cleared  10/24/11 chol 203,trig 102,hdl 52,ldl 131 off med; start lipitor 20 mg  12/12 chol 167,trig 114,hdl 53,ldl 91 on lipitor 20 mg  12/2/13 chol 199,trig 167,hdl 52,ldl 114 on lipitor 20 mg  10/10/14 chol 198,trig 140,hdl 55,ldl 115 on lipitor 20 mg  6/3/15 chol 162,trig 159,hdl 50,ldl 80 on lipitor 20 mg  12/29/15 off lipitor  1/6/16 chol 233,trig 147,hdl 52,ldl 142 off lipitor  1/7/16 start pravachol 20 mg  2/12/16 chol 190,trig 134,hdl 46,ldl 117 on pravachol 20 mg  6/21/16 chol 184,trig 131,hdl 54,ldl 104 on pravachol 20 mg  4/6/17 chol 190,trig 147,hdl 51,ldl 110 on pravachol 20 mg  12/27/17 chol  215,trig 201,hdl 53,ldl 122 on pravachol 20 mg  6/7/19 chol 200,trig 195,hdl 50,ldl 111 on pravachol 20 mg  7/16/20 chol 203,trig 115,hdl 55,ldl 125 on pravachol 20 mg  7/17/20 change to crestor 10 mg  1/19/21 back on pravachol 20 mg (intolerant to crestor)  2/18/21 chol 211,trig 175,hdl 47,ldl 129 on pravastatin 20 mg  2/23/21 change to lipitor 20 mg and stop pravastatin  (lipitor cause muscle pain)     BPH  5/28/15 start Flomax  12/29/15 start cialis 5 mg daily   4/6/17 psa 2.8  6/7/19 psa 2.1  7/2/20 could not tolerate flomax, trial doxazosin 2 mg  7/16/20 psa 2.3               Patient Active Problem List   Diagnosis   • Hypertension   • Morbid obesity (HCC)   • S/p total left knee replacement   • Preventative health care   • Dyslipidemia   • Low back pain   • Renal cyst   • Impaired fasting glucose   • BPH (benign prostatic hypertrophy)   • Hypogonadism male   • Neck pain   • Knee pain, right   • Macular degeneration       Depression Screening  Little interest or pleasure in doing things?  0 - not at all  Feeling down, depressed , or hopeless? 0 - not at all  Patient Health Questionnaire Score: 0        Patient Care Team:  Phil Kohler M.D. as PCP - General    ROS           Objective          Physical Exam  Vitals and nursing note reviewed.   Constitutional:       Appearance: Normal appearance.   HENT:      Head: Normocephalic and atraumatic.      Right Ear: External ear normal.      Left Ear: External ear normal.   Eyes:      Conjunctiva/sclera: Conjunctivae normal.   Cardiovascular:      Rate and Rhythm: Normal rate and regular rhythm.   Pulmonary:      Effort: Pulmonary effort is normal.      Breath sounds: Normal breath sounds.   Abdominal:      General: There is no distension.   Musculoskeletal:         General: No swelling.      Cervical back: Neck supple.   Skin:     General: Skin is warm.   Neurological:      General: No focal deficit present.      Mental Status: He is alert.   Psychiatric:          Mood and Affect: Mood normal.         Behavior: Behavior normal.        TMs clear bilateral           Assessment & Plan        Assessment  #1  Impaired glucose metabolism last A1c 6.4% intolerant of Metformin last lab over a year ago no medications, most recent eye exam in May of last year macular degeneration    #2 dyslipidemia no recent labs on Lipitor    #3 hypertension on Diovan HCT     #4 BMI 43    #5 Chest congestion with post nasal drip, lungs clear and normal saturation with no fever, tried Astelin no benefit    #6 tinnitus no hearing loss or dizziness    #7 vitamin D deficiency    #8 macular degeneration wet    Plan  #1 declines colonoscopy    #2 cologuard ordered    #3 continue checking blood sugar and blood pressure daily     #4 shingrix to get at the pharmacy, recommend he consider the Covid booster as well    #5 trial of Ozempic 0.25 mg weekly, can cause nausea, bloating, this will help with sugar and weight loss, we can increase his dose monthly to a maximum of 1 mg in 2 months, call me for side effects    #6  Atrovent nasal spray for nasal congestion    #7 chest x-ray for cough    #8 follow-up 4 months    #9 continue current medications pending labs    #10 continue work on good nutrition, diet, exercise, weight loss, he has lost 23 pounds since retiring, he is doing a tremendous job, continue avoid sweets, candies, processed foods    #11 follow-up retina specialist

## 2022-03-16 ENCOUNTER — HOSPITAL ENCOUNTER (OUTPATIENT)
Dept: LAB | Facility: MEDICAL CENTER | Age: 67
End: 2022-03-16
Attending: INTERNAL MEDICINE
Payer: MEDICARE

## 2022-03-16 ENCOUNTER — TELEPHONE (OUTPATIENT)
Dept: MEDICAL GROUP | Facility: MEDICAL CENTER | Age: 67
End: 2022-03-16

## 2022-03-16 ENCOUNTER — APPOINTMENT (OUTPATIENT)
Dept: RADIOLOGY | Facility: IMAGING CENTER | Age: 67
End: 2022-03-16
Attending: INTERNAL MEDICINE
Payer: MEDICARE

## 2022-03-16 DIAGNOSIS — Z12.5 PROSTATE CANCER SCREENING: ICD-10-CM

## 2022-03-16 DIAGNOSIS — R05.9 COUGH: ICD-10-CM

## 2022-03-16 DIAGNOSIS — I10 PRIMARY HYPERTENSION: ICD-10-CM

## 2022-03-16 DIAGNOSIS — R73.01 IMPAIRED FASTING GLUCOSE: ICD-10-CM

## 2022-03-16 DIAGNOSIS — E78.5 DYSLIPIDEMIA: Chronic | ICD-10-CM

## 2022-03-16 DIAGNOSIS — E55.9 VITAMIN D DEFICIENCY: ICD-10-CM

## 2022-03-16 DIAGNOSIS — E11.9 DIABETES MELLITUS WITHOUT COMPLICATION (HCC): ICD-10-CM

## 2022-03-16 PROBLEM — Z87.891 HISTORY OF TOBACCO USE: Status: ACTIVE | Noted: 2022-03-16

## 2022-03-16 LAB
25(OH)D3 SERPL-MCNC: 83 NG/ML (ref 30–100)
ALBUMIN SERPL BCP-MCNC: 4.3 G/DL (ref 3.2–4.9)
ALBUMIN/GLOB SERPL: 1.6 G/DL
ALP SERPL-CCNC: 73 U/L (ref 30–99)
ALT SERPL-CCNC: 8 U/L (ref 2–50)
ANION GAP SERPL CALC-SCNC: 14 MMOL/L (ref 7–16)
APPEARANCE UR: CLEAR
AST SERPL-CCNC: 12 U/L (ref 12–45)
BASOPHILS # BLD AUTO: 0.9 % (ref 0–1.8)
BASOPHILS # BLD: 0.1 K/UL (ref 0–0.12)
BILIRUB SERPL-MCNC: 0.4 MG/DL (ref 0.1–1.5)
BILIRUB UR QL STRIP.AUTO: NEGATIVE
BUN SERPL-MCNC: 19 MG/DL (ref 8–22)
CALCIUM SERPL-MCNC: 9.9 MG/DL (ref 8.5–10.5)
CHLORIDE SERPL-SCNC: 101 MMOL/L (ref 96–112)
CHOLEST SERPL-MCNC: 189 MG/DL (ref 100–199)
CO2 SERPL-SCNC: 25 MMOL/L (ref 20–33)
COLOR UR: YELLOW
CREAT SERPL-MCNC: 0.86 MG/DL (ref 0.5–1.4)
CREAT UR-MCNC: 139.57 MG/DL
EOSINOPHIL # BLD AUTO: 0.27 K/UL (ref 0–0.51)
EOSINOPHIL NFR BLD: 2.5 % (ref 0–6.9)
ERYTHROCYTE [DISTWIDTH] IN BLOOD BY AUTOMATED COUNT: 47.2 FL (ref 35.9–50)
EST. AVERAGE GLUCOSE BLD GHB EST-MCNC: 128 MG/DL
FASTING STATUS PATIENT QL REPORTED: NORMAL
GFR SERPLBLD CREATININE-BSD FMLA CKD-EPI: 94 ML/MIN/1.73 M 2
GLOBULIN SER CALC-MCNC: 2.7 G/DL (ref 1.9–3.5)
GLUCOSE SERPL-MCNC: 107 MG/DL (ref 65–99)
GLUCOSE UR STRIP.AUTO-MCNC: NEGATIVE MG/DL
HBA1C MFR BLD: 6.1 % (ref 4–5.6)
HCT VFR BLD AUTO: 51.2 % (ref 42–52)
HDLC SERPL-MCNC: 49 MG/DL
HGB BLD-MCNC: 16.6 G/DL (ref 14–18)
IMM GRANULOCYTES # BLD AUTO: 0.04 K/UL (ref 0–0.11)
IMM GRANULOCYTES NFR BLD AUTO: 0.4 % (ref 0–0.9)
KETONES UR STRIP.AUTO-MCNC: NEGATIVE MG/DL
LDLC SERPL CALC-MCNC: 101 MG/DL
LEUKOCYTE ESTERASE UR QL STRIP.AUTO: NEGATIVE
LYMPHOCYTES # BLD AUTO: 2.33 K/UL (ref 1–4.8)
LYMPHOCYTES NFR BLD: 21.3 % (ref 22–41)
MCH RBC QN AUTO: 31.4 PG (ref 27–33)
MCHC RBC AUTO-ENTMCNC: 32.4 G/DL (ref 33.7–35.3)
MCV RBC AUTO: 96.8 FL (ref 81.4–97.8)
MICRO URNS: NORMAL
MICROALBUMIN UR-MCNC: <1.2 MG/DL
MICROALBUMIN/CREAT UR: NORMAL MG/G (ref 0–30)
MONOCYTES # BLD AUTO: 0.94 K/UL (ref 0–0.85)
MONOCYTES NFR BLD AUTO: 8.6 % (ref 0–13.4)
NEUTROPHILS # BLD AUTO: 7.26 K/UL (ref 1.82–7.42)
NEUTROPHILS NFR BLD: 66.3 % (ref 44–72)
NITRITE UR QL STRIP.AUTO: NEGATIVE
NRBC # BLD AUTO: 0 K/UL
NRBC BLD-RTO: 0 /100 WBC
PH UR STRIP.AUTO: 6 [PH] (ref 5–8)
PLATELET # BLD AUTO: 267 K/UL (ref 164–446)
PMV BLD AUTO: 11.2 FL (ref 9–12.9)
POTASSIUM SERPL-SCNC: 4.6 MMOL/L (ref 3.6–5.5)
PROT SERPL-MCNC: 7 G/DL (ref 6–8.2)
PROT UR QL STRIP: NEGATIVE MG/DL
PSA SERPL-MCNC: 2.96 NG/ML (ref 0–4)
RBC # BLD AUTO: 5.29 M/UL (ref 4.7–6.1)
RBC UR QL AUTO: NEGATIVE
SODIUM SERPL-SCNC: 140 MMOL/L (ref 135–145)
SP GR UR STRIP.AUTO: 1.02
TRIGL SERPL-MCNC: 193 MG/DL (ref 0–149)
TSH SERPL DL<=0.005 MIU/L-ACNC: 1.79 UIU/ML (ref 0.38–5.33)
UROBILINOGEN UR STRIP.AUTO-MCNC: 0.2 MG/DL
WBC # BLD AUTO: 10.9 K/UL (ref 4.8–10.8)

## 2022-03-16 PROCEDURE — 83036 HEMOGLOBIN GLYCOSYLATED A1C: CPT

## 2022-03-16 PROCEDURE — 82570 ASSAY OF URINE CREATININE: CPT

## 2022-03-16 PROCEDURE — 84153 ASSAY OF PSA TOTAL: CPT

## 2022-03-16 PROCEDURE — 84443 ASSAY THYROID STIM HORMONE: CPT

## 2022-03-16 PROCEDURE — 80053 COMPREHEN METABOLIC PANEL: CPT

## 2022-03-16 PROCEDURE — 82043 UR ALBUMIN QUANTITATIVE: CPT

## 2022-03-16 PROCEDURE — 82306 VITAMIN D 25 HYDROXY: CPT

## 2022-03-16 PROCEDURE — 80061 LIPID PANEL: CPT

## 2022-03-16 PROCEDURE — 85025 COMPLETE CBC W/AUTO DIFF WBC: CPT

## 2022-03-16 PROCEDURE — 71046 X-RAY EXAM CHEST 2 VIEWS: CPT | Mod: TC | Performed by: STUDENT IN AN ORGANIZED HEALTH CARE EDUCATION/TRAINING PROGRAM

## 2022-03-16 PROCEDURE — 36415 COLL VENOUS BLD VENIPUNCTURE: CPT

## 2022-03-16 PROCEDURE — 81003 URINALYSIS AUTO W/O SCOPE: CPT

## 2022-03-17 ENCOUNTER — TELEPHONE (OUTPATIENT)
Dept: MEDICAL GROUP | Facility: MEDICAL CENTER | Age: 67
End: 2022-03-17
Payer: MEDICARE

## 2022-03-17 DIAGNOSIS — R73.01 IMPAIRED FASTING GLUCOSE: Chronic | ICD-10-CM

## 2022-03-17 DIAGNOSIS — J44.9 CHRONIC OBSTRUCTIVE PULMONARY DISEASE, UNSPECIFIED COPD TYPE (HCC): ICD-10-CM

## 2022-03-17 DIAGNOSIS — E66.01 MORBID OBESITY (HCC): Chronic | ICD-10-CM

## 2022-03-17 NOTE — TELEPHONE ENCOUNTER
Please notify the patient that his chest x-ray shows no evidence of pneumonia or inflammation.  The x-ray does suggest that he might have lung disease related to smoking.  I know he quit smoking many years ago however he still could have some lung related damage even though he quit smoking many years ago.    I would recommend getting a breathing test of his lungs to see if the smoking did contribute to lung damage as that could cause a chronic cough.  If he would like me to order the breathing test of his labs, please let me know.

## 2022-03-17 NOTE — TELEPHONE ENCOUNTER
Called the patient, line was busy unable to leave a message, please notify the patient that his test shows:  (1) his diabetes test is excellent at 6.1% that is improved from his last test, normal is less than 5.7% but there is no need for diabetes medications, have him continue good nutrition and exercise program.  I would like him to start the weight loss medication if it was covered by his insurance  (2) his thyroid blood test, vitamin D, prostate cancer blood test is normal  (3) his liver function and kidney function tests are normal  (4) cholesterol is excellent at 189, good cholesterol is 49, bad cholesterol is 101 (goal is less than 100), have him continue on the cholesterol medication

## 2022-03-17 NOTE — TELEPHONE ENCOUNTER
----- Message from Phil Kohler M.D. sent at 3/16/2022  7:39 PM PDT -----  Called the patient, line was busy unable to leave a message, please notify the patient that his test shows:  (1) his diabetes test is excellent at 6.1% that is improved from his last test, normal is less than 5.7% but there is no need for diabetes medications, have him continue good nutrition and exercise program.  I would like him to start the weight loss medication if it was covered by his insurance  (2) his thyroid blood test, vitamin D, prostate cancer blood test is normal  (3) his liver function and kidney function tests are normal  (4) cholesterol is excellent at 189, good cholesterol is 49, bad cholesterol is 101 (goal is less than 100), have him continue on the cholesterol medication

## 2022-03-17 NOTE — TELEPHONE ENCOUNTER
Pt started the wt loss meds on Monday, has lost 4 lbs to date. Pt would also like to do the breathing test.

## 2022-03-17 NOTE — TELEPHONE ENCOUNTER
----- Message from Phil Kohler M.D. sent at 3/16/2022  7:43 PM PDT -----  Please notify the patient that his chest x-ray shows no evidence of pneumonia or inflammation.  The x-ray does suggest that he might have lung disease related to smoking.  I know he quit smoking many years ago however he still could have some lung related damage even though he quit smoking many years ago.    I would recommend getting a breathing test of his lungs to see if the smoking did contribute to lung damage as that could cause a chronic cough.  If he would like me to order the breathing test of his labs, please let me know.

## 2022-03-18 NOTE — TELEPHONE ENCOUNTER
Please notify the patient that I submitted the referral for the breathing test, the pulmonary function test (lung function test) department should contact him to set up the appointment.  If he has not heard from them in 1 week please have him let us know.

## 2022-04-06 DIAGNOSIS — I10 HYPERTENSION, UNSPECIFIED TYPE: ICD-10-CM

## 2022-04-06 RX ORDER — VALSARTAN AND HYDROCHLOROTHIAZIDE 160; 25 MG/1; MG/1
1 TABLET ORAL DAILY
Qty: 90 TABLET | Refills: 3 | Status: SHIPPED | OUTPATIENT
Start: 2022-04-06 | End: 2023-04-09

## 2022-07-07 ENCOUNTER — APPOINTMENT (OUTPATIENT)
Dept: MEDICAL GROUP | Facility: MEDICAL CENTER | Age: 67
End: 2022-07-07
Payer: MEDICARE

## 2022-08-02 ENCOUNTER — OFFICE VISIT (OUTPATIENT)
Dept: MEDICAL GROUP | Facility: MEDICAL CENTER | Age: 67
End: 2022-08-02
Payer: MEDICARE

## 2022-08-02 VITALS
WEIGHT: 302 LBS | DIASTOLIC BLOOD PRESSURE: 78 MMHG | BODY MASS INDEX: 42.28 KG/M2 | HEART RATE: 89 BPM | HEIGHT: 71 IN | OXYGEN SATURATION: 96 % | TEMPERATURE: 99.2 F | SYSTOLIC BLOOD PRESSURE: 130 MMHG

## 2022-08-02 DIAGNOSIS — E66.01 MORBID OBESITY (HCC): Chronic | ICD-10-CM

## 2022-08-02 DIAGNOSIS — Z00.00 PREVENTATIVE HEALTH CARE: Chronic | ICD-10-CM

## 2022-08-02 DIAGNOSIS — E78.5 DYSLIPIDEMIA: ICD-10-CM

## 2022-08-02 DIAGNOSIS — I10 HYPERTENSION, UNSPECIFIED TYPE: ICD-10-CM

## 2022-08-02 DIAGNOSIS — E11.9 DIABETES MELLITUS WITHOUT COMPLICATION (HCC): ICD-10-CM

## 2022-08-02 PROCEDURE — 99214 OFFICE O/P EST MOD 30 MIN: CPT | Performed by: INTERNAL MEDICINE

## 2022-08-02 RX ORDER — SEMAGLUTIDE 1.34 MG/ML
1 INJECTION, SOLUTION SUBCUTANEOUS
Qty: 3 ML | Refills: 11 | Status: SHIPPED | OUTPATIENT
Start: 2022-08-02 | End: 2023-06-02 | Stop reason: SDUPTHER

## 2022-08-02 ASSESSMENT — FIBROSIS 4 INDEX: FIB4 SCORE: 1.1

## 2022-08-02 NOTE — PROGRESS NOTES
Subjective     Darci Davis is a 67 y.o. male who presents for follow-up blood sugar        HPI       Blood sugars running 100-110 before breakfast and blood pressures 125-130/65-75, has been able to lose weight has been on ozempic 0.5 mg weekly with no bloating or gas.  Started on Ozempic in March and he was metformin intolerant, A1c was 6.4% last year off metformin, 6.1% earlier this year prior to initiation of Ozempic, has been keeping active and does work in the fields five hours per day and tries to hydrate with water, uses a hat and long sleeves outdoors.  Tries to limit sweets, candies, processed foods including carbohydrates.  Blood pressure has been stable on Diovan HCT, tries to drink enough water during the day.  No chest pain, lightheadedness with activity.  Dyslipidemia Lipitor 20 mg, no muscle pain with statin therapy.  Has been working in the yard pounding poles and does have some right low back pain that feels like a muscle cramp, no hematuria.  No history of kidney stones.  No radiation down the legs.  No weakness of his legs.  No falls, no trauma.  No snoring, no history of sleep apnea  No tobacco, no etoh         Current Outpatient Medications   Medication Sig Dispense Refill   • Semaglutide,0.25 or 0.5MG/DOS, (OZEMPIC, 0.25 OR 0.5 MG/DOSE,) 2 MG/1.5ML Solution Pen-injector Inject 0.5 mg under the skin every 7 days. 1.5 mL 1   • atorvastatin (LIPITOR) 20 MG Tab TAKE 1 TABLET BY MOUTH ONCE DAILY 90 Tablet 3   • celecoxib (CELEBREX) 100 MG Cap TAKE ONE CAPSULE BY MOUTH EVERY DAY AS NEEDED FOR PAIN 90 Capsule 3   • valsartan-hydrochlorothiazide (DIOVAN-HCT) 160-25 MG per tablet Take 1 Tablet by mouth every day. 90 Tablet 3   • ipratropium (ATROVENT) 0.03 % Solution Administer 2 Sprays into affected nostril(S) every 12 hours. 30 mL 6   • FREESTYLE LITE strip USE TO CHECK BLOOD SUGAR ONCE DAILY BEFORE BREAKFAST. 50 Strip 11   • Blood Glucose Monitoring Suppl (FREESTYLE LITE) Device E11.9 1 Each  0   • Lancets Freestyle lite meter lancets, check blood sugar once a day before breakfast, E11.9 100 Each 11   • Probiotic Product (PRO-BIOTIC BLEND PO) Take  by mouth.     • Cholecalciferol (VITAMIN D) 2000 UNITS Cap Take  by mouth every day.       No current facility-administered medications for this visit.               Status post total knee replacement  6/08  right meniscus repair  5/11 MRI left knee medial meniscus tear, moderate chondral thinning, chondromalacia  6/11 dr.sobiek bolanos left knee medial and lateral meniscectomy  7/12  left knee injection  12/12 now sees dr.mendez ortho  1/13  ortho note, injection left knee  5/21/13 dr.webster bolanos note, injection left knee  3/31/15  ortho note; left knee injection steroid  5/29/15  ortho note knee arthritis,second injection euflexxa left knee  7/11/16  orthopedic operative note, left total knee arthroplasty  1/31/17  orthopedic note 6 months status post left total knee, x-ray left knee 3 view good component position and alignment, follow-up 6 months repeat x-rays  8/27/19  orthopedic note injury right knee, x-ray loss of medial joint space findings consistent with lateral meniscus tear, obtain MRI follow-up after imaging completed     Renal cyst  12/9/13 ultrasound abdomen left renal 18 mm hypoechoic mass most likely cysts, MRI or CT to clarify  12/13/13 CT abd/pelvis with and without; 8 mm simple appearing cyst in the lower pole the left kidney which is discordant in size with the recent ultrasound. Followup ultrasound surveillance or MRI examination may be of value to further study the left kidney,11 mm in diameter simple cyst in the midpole region of the right kidney  11/17/14 ultrasound renal kidneys are poorly visualized due to poor sonographic penetration of the current exam,no focal renal cyst or mass is identified in the left kidney as seen on prior imaging studies at this time      Preventative health  10/9/14 zoster vaccine  5/28/15 tdap  6/5/19 prevnar  7/2/20 pneumovax   4/10/21 covid moderna second  3/7/22 declines colonoscopy, cologuard ordered  3/16/22 psa 2.9  3/16/22 vit d 83     neck pain  2/11/16 [place] note evaluation cervical pain, x-rays cervical spine from february 2016 showed lordosis C4-C6 and DJD, slight C4-C5 spondylolisthesis, will obtain cervical flexion and extension x-rays, continue naprosyn and robaxin as needed, start physical therapy, follow-up 6 weeks if no improvement consider MRI     Macular degeneration  5/17/21   retina note 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, follow-up 3 weeks     Morbid obesity  7/2/13 declines bariatric eval  7/12/13 apnea link AHI 2, desaturation <89% for 27 min, total sleep time 6 hours 34 min  12/2/13  referral bariatric surgery  10/9/14 BMI 44.3; referral to  weight loss bariatric  5/28/15 BMI 46.3 has seen  and declines bariatric surgery, trial orlistat  12/29/15 BMI 46.3   2/4/16 patient called, insurance does not cover bariatric surgery  6/16/16 BMI 45.2  9/16/16 BMI 42.8   3/6/17 BMI 44.3 insurance not cover bariatric, declines nutrition consultation; 20-30 minutes bike per day     low back pain  12/12 x-ray hip bilateral mild DJD  12/12 x-ray lumbar spine multilevel DJD and arthropathy, mild to moderate dextroconvex scoliosis     knee pain right  8/27/19  orthopedic note injury right knee, x-ray loss of medial joint space findings consistent with lateral meniscus tear, obtain MRI follow-up after imaging completed  10/1/19  orthopedic evaluation right knee pain, MRI right knee shows medial and patellofemoral degenerative joint changes options discussed conservative treatment versus right arthroscopy, patient elects to proceed with arthroscopy right knee     Impaired fasting blood sugar  10/10/14 bs 114  6/3/15 A1c 5.9%,bs 94  1/6/16 bs 104,A1c 6.1%  6/21/16 A1c  5.8%  4/6/17 bs 104  12/27/17 A1c 5.8%  6/7/19 A1c 6%  7/16/20 A1c 6.4%  7/17/20 start metformin  mg, order for freestyle lite meter and strips to pharmacy, patient will bring here for instruction  1/19/21 off metformin   2/18/21 A1c 6.4% no meds  5/17/21   retina note 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, follow-up 3 weeks  3/16/22 A1c 6.1%  3/7/22 started ozempic 0.25 mg weekly  4/19/22 increase to ozempic 0.5 mg weekly  Intolerant metformin      Hypertension  9/08 urine mac 9, intolerant ace cough  Was on diovan hct 320/12.5 caused lightheadedness, taking half pill a day  10/14/11 on diovan 320/12.5 mg  11/3/11 change to diovan 320 mg, combo dose caused fatigue  12/6/12 change to diovan 160/25 mg daily  12/12 urine mac 4 on diovan hct 160/25 mg  12/2/13 urine mac <0.5 on diovan hct 160/25 mg  10/10/14 urine mac 6 on diovan 160/25 mg  6/3/15 urine mac <0.5 on diovan 160/25 mg  1/6/16 urine mac <0.5 on diovan hct 160/25 mg  4/6/17 urine mac <0.7 on diovan hct 160/25 mg  5/16/18 ultrasound carotid negative   7/16/20 urine mac<1.2 on diovan hct 160/25 mg   2/18/21 urine mac<1.2  on diovan hct 160/25 mg   3/16/22 urine mac<1.2 on diovan hct 160/25 mg      Dyslipidemia  9/08 chol 213, trig 119, hdl 49, ldl 140  5/09 chol 171,trig 118,hdl 51,ldl 96  9/10 hold pravachol  3/11 chol 247,trig 143,hdl 51,ldl 167   3/11 start lipitor samples  5/11 need to try crestor per insurance, trial of crestor 10 mg  10/14/11 on lipitor 20 mg insurance cleared  10/24/11 chol 203,trig 102,hdl 52,ldl 131 off med; start lipitor 20 mg  12/12 chol 167,trig 114,hdl 53,ldl 91 on lipitor 20 mg  12/2/13 chol 199,trig 167,hdl 52,ldl 114 on lipitor 20 mg  10/10/14 chol 198,trig 140,hdl 55,ldl 115 on lipitor 20 mg  6/3/15 chol 162,trig 159,hdl 50,ldl 80 on lipitor 20 mg  12/29/15 off lipitor  1/6/16 chol 233,trig 147,hdl 52,ldl 142 off lipitor  1/7/16 start pravachol 20 mg  2/12/16 chol 190,trig 134,hdl 46,ldl 117 on  "pravachol 20 mg  6/21/16 chol 184,trig 131,hdl 54,ldl 104 on pravachol 20 mg  4/6/17 chol 190,trig 147,hdl 51,ldl 110 on pravachol 20 mg  12/27/17 chol 215,trig 201,hdl 53,ldl 122 on pravachol 20 mg  6/7/19 chol 200,trig 195,hdl 50,ldl 111 on pravachol 20 mg  7/16/20 chol 203,trig 115,hdl 55,ldl 125 on pravachol 20 mg  7/17/20 change to crestor 10 mg  1/19/21 back on pravachol 20 mg (intolerant to crestor)  2/18/21 chol 211,trig 175,hdl 47,ldl 129 on pravastatin 20 mg  2/23/21 change to lipitor 20 mg and stop pravastatin  3/16/22 chol 189,trig 193,hdl 49,ldl 101 on lipitor 20 mg  (lipitor cause muscle pain)     BPH  5/28/15 start Flomax  12/29/15 start cialis 5 mg daily   4/6/17 psa 2.8  6/7/19 psa 2.1  7/2/20 could not tolerate flomax, trial doxazosin 2 mg  7/16/20 psa 2.3  3/7/22 off doxasozin   3/16/22 psa 2.9                 Patient Active Problem List   Diagnosis   • Hypertension   • Morbid obesity (HCC)   • S/p knee left replacement   • Allergic rhinitis   • Preventative health care   • Dyslipidemia   • Low back pain   • Renal cyst   • Impaired fasting glucose   • BPH (benign prostatic hypertrophy)   • Hypogonadism male   • Neck pain   • Knee pain, right   • Macular degeneration   • History of tobacco use        Patient Care Team:  Phil Kohler M.D. as PCP - General      ROS           Objective     /78   Pulse 89   Temp 37.3 °C (99.2 °F) (Temporal)   Ht 1.803 m (5' 11\")   Wt (!) 137 kg (302 lb)   SpO2 96%   BMI 42.12 kg/m²      Physical Exam  Vitals and nursing note reviewed.   Constitutional:       Appearance: Normal appearance.   HENT:      Head: Normocephalic and atraumatic.      Right Ear: External ear normal.      Left Ear: External ear normal.   Eyes:      Conjunctiva/sclera: Conjunctivae normal.   Cardiovascular:      Rate and Rhythm: Normal rate and regular rhythm.      Heart sounds: Normal heart sounds. No murmur heard.  Pulmonary:      Effort: No respiratory distress.      Breath " sounds: Normal breath sounds.   Abdominal:      General: There is no distension.   Musculoskeletal:         General: No swelling.      Cervical back: Neck supple.   Skin:     General: Skin is warm.   Neurological:      General: No focal deficit present.      Mental Status: He is alert.   Psychiatric:         Mood and Affect: Mood normal.         Behavior: Behavior normal.     Back no spinal tenderness, no CVA tenderness, mild tenderness right lumbar paraspinal muscle        Assessment & Plan        Assessment  #!  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 Increase ozempic to 1 mg weekly continue to check blood sugars and record    #2 continue check blood pressure recordings are adequate hydration in the heat, continue hat and sunscreen use outdoors    #3 continue work on good nutrition, diet, exercise, weight loss    #4 Ensure adequate hydration of water    #5 repeat labs ordered for next month    #6 follow up 6 months

## 2022-09-27 ENCOUNTER — TELEPHONE (OUTPATIENT)
Dept: MEDICAL GROUP | Facility: MEDICAL CENTER | Age: 67
End: 2022-09-27
Payer: MEDICARE

## 2022-12-30 ENCOUNTER — TELEPHONE (OUTPATIENT)
Dept: HEALTH INFORMATION MANAGEMENT | Facility: OTHER | Age: 67
End: 2022-12-30
Payer: MEDICARE

## 2022-12-30 ENCOUNTER — DOCUMENTATION (OUTPATIENT)
Dept: HEALTH INFORMATION MANAGEMENT | Facility: OTHER | Age: 67
End: 2022-12-30
Payer: MEDICARE

## 2023-01-13 ENCOUNTER — TELEPHONE (OUTPATIENT)
Dept: MEDICAL GROUP | Facility: MEDICAL CENTER | Age: 68
End: 2023-01-13
Payer: MEDICARE

## 2023-01-13 NOTE — TELEPHONE ENCOUNTER
The only suggestions I would have for the Ozempic is he could check with the [facility] on [address] as that is covered on his plan and also see if there is a mail away option for his prescription plan.

## 2023-01-13 NOTE — TELEPHONE ENCOUNTER
Darci Davis  [phone] (home)     Spoke with pt and he is concerned that his Ozempic is so hard to get. Wants to know if there is anything that needs to be done. States that you asked him to increase his dose and he has not. Please advise.

## 2023-01-16 NOTE — TELEPHONE ENCOUNTER
Pt got his Ozempic at [facility]. States that [facility] was over $200 and he got it for $46 at [facility].

## 2023-04-30 ENCOUNTER — TELEPHONE (OUTPATIENT)
Dept: MEDICAL GROUP | Facility: MEDICAL CENTER | Age: 68
End: 2023-04-30
Payer: MEDICARE

## 2023-04-30 DIAGNOSIS — I10 PRIMARY HYPERTENSION: Chronic | ICD-10-CM

## 2023-04-30 DIAGNOSIS — E11.9 DIABETES MELLITUS WITHOUT COMPLICATION (HCC): ICD-10-CM

## 2023-04-30 DIAGNOSIS — E55.9 VITAMIN D DEFICIENCY: ICD-10-CM

## 2023-04-30 DIAGNOSIS — Z12.5 PROSTATE CANCER SCREENING: ICD-10-CM

## 2023-04-30 DIAGNOSIS — E78.5 DYSLIPIDEMIA: Chronic | ICD-10-CM

## 2023-04-30 RX ORDER — TESTOSTERONE 16.2 MG/G
GEL TRANSDERMAL
COMMUNITY
End: 2023-10-09

## 2023-04-30 RX ORDER — ATORVASTATIN CALCIUM 20 MG/1
20 TABLET, FILM COATED ORAL DAILY
Qty: 45 TABLET | Refills: 0 | Status: SHIPPED | OUTPATIENT
Start: 2023-04-30 | End: 2023-06-02 | Stop reason: SDUPTHER

## 2023-05-01 DIAGNOSIS — L30.9 ECZEMA: ICD-10-CM

## 2023-05-01 RX ORDER — TRIAMCINOLONE ACETONIDE 1 MG/G
CREAM TOPICAL
Qty: 45 G | Refills: 4 | Status: SHIPPED | OUTPATIENT
Start: 2023-05-01

## 2023-05-01 NOTE — TELEPHONE ENCOUNTER
Please notify the patient that he is overdue for his fasting blood and urine test, these were ordered in August of last year.  I have resubmitted the orders in the computer system.  Also please have him schedule a follow-up appointment to see me for his diabetes.

## 2023-06-02 ENCOUNTER — TELEPHONE (OUTPATIENT)
Dept: MEDICAL GROUP | Facility: MEDICAL CENTER | Age: 68
End: 2023-06-02

## 2023-06-02 ENCOUNTER — HOSPITAL ENCOUNTER (OUTPATIENT)
Dept: LAB | Facility: MEDICAL CENTER | Age: 68
End: 2023-06-02
Attending: INTERNAL MEDICINE
Payer: MEDICARE

## 2023-06-02 DIAGNOSIS — E55.9 VITAMIN D DEFICIENCY: ICD-10-CM

## 2023-06-02 DIAGNOSIS — E78.5 DYSLIPIDEMIA: Chronic | ICD-10-CM

## 2023-06-02 DIAGNOSIS — E11.9 DIABETES MELLITUS WITHOUT COMPLICATION (HCC): ICD-10-CM

## 2023-06-02 DIAGNOSIS — Z12.5 PROSTATE CANCER SCREENING: ICD-10-CM

## 2023-06-02 LAB
ALBUMIN SERPL BCP-MCNC: 4.1 G/DL (ref 3.2–4.9)
ALBUMIN/GLOB SERPL: 1.2 G/DL
ALP SERPL-CCNC: 64 U/L (ref 30–99)
ALT SERPL-CCNC: 16 U/L (ref 2–50)
ANION GAP SERPL CALC-SCNC: 12 MMOL/L (ref 7–16)
APPEARANCE UR: CLEAR
AST SERPL-CCNC: 22 U/L (ref 12–45)
BASOPHILS # BLD AUTO: 1.1 % (ref 0–1.8)
BASOPHILS # BLD: 0.08 K/UL (ref 0–0.12)
BILIRUB SERPL-MCNC: 0.4 MG/DL (ref 0.1–1.5)
BILIRUB UR QL STRIP.AUTO: NEGATIVE
BUN SERPL-MCNC: 17 MG/DL (ref 8–22)
CALCIUM ALBUM COR SERPL-MCNC: 9.7 MG/DL (ref 8.5–10.5)
CALCIUM SERPL-MCNC: 9.8 MG/DL (ref 8.5–10.5)
CHLORIDE SERPL-SCNC: 100 MMOL/L (ref 96–112)
CHOLEST SERPL-MCNC: 246 MG/DL (ref 100–199)
CO2 SERPL-SCNC: 26 MMOL/L (ref 20–33)
COLOR UR: YELLOW
CREAT SERPL-MCNC: 0.88 MG/DL (ref 0.5–1.4)
EOSINOPHIL # BLD AUTO: 0.19 K/UL (ref 0–0.51)
EOSINOPHIL NFR BLD: 2.7 % (ref 0–6.9)
ERYTHROCYTE [DISTWIDTH] IN BLOOD BY AUTOMATED COUNT: 47.4 FL (ref 35.9–50)
EST. AVERAGE GLUCOSE BLD GHB EST-MCNC: 128 MG/DL
FASTING STATUS PATIENT QL REPORTED: NORMAL
GFR SERPLBLD CREATININE-BSD FMLA CKD-EPI: 92 ML/MIN/1.73 M 2
GLOBULIN SER CALC-MCNC: 3.3 G/DL (ref 1.9–3.5)
GLUCOSE SERPL-MCNC: 89 MG/DL (ref 65–99)
GLUCOSE UR STRIP.AUTO-MCNC: NEGATIVE MG/DL
HBA1C MFR BLD: 6.1 % (ref 4–5.6)
HCT VFR BLD AUTO: 50.6 % (ref 42–52)
HDLC SERPL-MCNC: 51 MG/DL
HGB BLD-MCNC: 17.2 G/DL (ref 14–18)
IMM GRANULOCYTES # BLD AUTO: 0.02 K/UL (ref 0–0.11)
IMM GRANULOCYTES NFR BLD AUTO: 0.3 % (ref 0–0.9)
KETONES UR STRIP.AUTO-MCNC: NEGATIVE MG/DL
LDLC SERPL CALC-MCNC: 167 MG/DL
LEUKOCYTE ESTERASE UR QL STRIP.AUTO: NEGATIVE
LYMPHOCYTES # BLD AUTO: 1.68 K/UL (ref 1–4.8)
LYMPHOCYTES NFR BLD: 23.6 % (ref 22–41)
MCH RBC QN AUTO: 32.3 PG (ref 27–33)
MCHC RBC AUTO-ENTMCNC: 34 G/DL (ref 32.3–36.5)
MCV RBC AUTO: 95.1 FL (ref 81.4–97.8)
MICRO URNS: NORMAL
MONOCYTES # BLD AUTO: 1.34 K/UL (ref 0–0.85)
MONOCYTES NFR BLD AUTO: 18.8 % (ref 0–13.4)
NEUTROPHILS # BLD AUTO: 3.82 K/UL (ref 1.82–7.42)
NEUTROPHILS NFR BLD: 53.5 % (ref 44–72)
NITRITE UR QL STRIP.AUTO: NEGATIVE
NRBC # BLD AUTO: 0 K/UL
NRBC BLD-RTO: 0 /100 WBC (ref 0–0.2)
PH UR STRIP.AUTO: 6 [PH] (ref 5–8)
PLATELET # BLD AUTO: 231 K/UL (ref 164–446)
PMV BLD AUTO: 11 FL (ref 9–12.9)
POTASSIUM SERPL-SCNC: 4.6 MMOL/L (ref 3.6–5.5)
PROT SERPL-MCNC: 7.4 G/DL (ref 6–8.2)
PROT UR QL STRIP: NEGATIVE MG/DL
RBC # BLD AUTO: 5.32 M/UL (ref 4.7–6.1)
RBC UR QL AUTO: NEGATIVE
SODIUM SERPL-SCNC: 138 MMOL/L (ref 135–145)
SP GR UR STRIP.AUTO: 1.02
TRIGL SERPL-MCNC: 140 MG/DL (ref 0–149)
UROBILINOGEN UR STRIP.AUTO-MCNC: 0.2 MG/DL
WBC # BLD AUTO: 7.1 K/UL (ref 4.8–10.8)

## 2023-06-02 PROCEDURE — 36415 COLL VENOUS BLD VENIPUNCTURE: CPT

## 2023-06-02 PROCEDURE — 83036 HEMOGLOBIN GLYCOSYLATED A1C: CPT

## 2023-06-02 PROCEDURE — 80061 LIPID PANEL: CPT

## 2023-06-02 PROCEDURE — 82043 UR ALBUMIN QUANTITATIVE: CPT

## 2023-06-02 PROCEDURE — 82570 ASSAY OF URINE CREATININE: CPT

## 2023-06-02 PROCEDURE — 80053 COMPREHEN METABOLIC PANEL: CPT

## 2023-06-02 PROCEDURE — 82306 VITAMIN D 25 HYDROXY: CPT

## 2023-06-02 PROCEDURE — 85025 COMPLETE CBC W/AUTO DIFF WBC: CPT

## 2023-06-02 PROCEDURE — 84443 ASSAY THYROID STIM HORMONE: CPT

## 2023-06-02 PROCEDURE — 81003 URINALYSIS AUTO W/O SCOPE: CPT

## 2023-06-02 PROCEDURE — 84153 ASSAY OF PSA TOTAL: CPT

## 2023-06-02 RX ORDER — ATORVASTATIN CALCIUM 20 MG/1
20 TABLET, FILM COATED ORAL DAILY
Qty: 90 TABLET | Refills: 0 | Status: SHIPPED | OUTPATIENT
Start: 2023-06-02 | End: 2023-06-26 | Stop reason: SDUPTHER

## 2023-06-02 RX ORDER — PEGCETACOPLAN 15 MG/.1ML
INJECTION, SOLUTION INTRAVITREAL
COMMUNITY
Start: 2023-06-01

## 2023-06-02 RX ORDER — SEMAGLUTIDE 1.34 MG/ML
1 INJECTION, SOLUTION SUBCUTANEOUS
Qty: 3 ML | Refills: 1 | Status: SHIPPED | OUTPATIENT
Start: 2023-06-02 | End: 2023-06-26

## 2023-06-03 ENCOUNTER — TELEPHONE (OUTPATIENT)
Dept: MEDICAL GROUP | Facility: MEDICAL CENTER | Age: 68
End: 2023-06-03
Payer: MEDICARE

## 2023-06-03 LAB
25(OH)D3 SERPL-MCNC: 42 NG/ML (ref 30–100)
CREAT UR-MCNC: 94.77 MG/DL
MICROALBUMIN UR-MCNC: <1.2 MG/DL
MICROALBUMIN/CREAT UR: NORMAL MG/G (ref 0–30)
PSA SERPL-MCNC: 3.73 NG/ML (ref 0–4)
TSH SERPL DL<=0.005 MIU/L-ACNC: 1.78 UIU/ML (ref 0.38–5.33)

## 2023-06-03 NOTE — TELEPHONE ENCOUNTER
Please call the patient, thank him for getting his blood test done and he is due for his follow-up diabetes appointment since he was last seen August of last year.      His diabetes A1c is still very good at 6.1%.  We can discuss the rest of his results at his appointment.

## 2023-06-08 ENCOUNTER — PATIENT MESSAGE (OUTPATIENT)
Dept: HEALTH INFORMATION MANAGEMENT | Facility: OTHER | Age: 68
End: 2023-06-08

## 2023-06-08 ENCOUNTER — TELEPHONE (OUTPATIENT)
Dept: MEDICAL GROUP | Facility: MEDICAL CENTER | Age: 68
End: 2023-06-08
Payer: MEDICARE

## 2023-06-08 NOTE — LETTER
Jennie 15, 2023        Darci Davis  [address]  [place] [address]        Dear Darci:    Please be advised that our office has tried to reach you by phone several times, without success.    Please note that Dr. Kohler has a message for you. Please call our office at your convenience, you can speak with any Medical Assistant on staff to get this message from your provider.    MESSAGE SENT WITH LETTER.    If you have any questions or concerns, please don't hesitate to call.        Sincerely,        Katie Moser, Med Ass't      Electronically Signed

## 2023-06-08 NOTE — TELEPHONE ENCOUNTER
----- Message from Phil Kohler M.D. sent at 6/3/2023  3:28 PM PDT -----  Please call the patient, thank him for getting his blood test done and he is due for his follow-up diabetes appointment since he was last seen August of last year.      His diabetes A1c is still very good at 6.1%.  We can discuss the rest of his results at his appointment.

## 2023-06-19 SDOH — HEALTH STABILITY: PHYSICAL HEALTH: ON AVERAGE, HOW MANY DAYS PER WEEK DO YOU ENGAGE IN MODERATE TO STRENUOUS EXERCISE (LIKE A BRISK WALK)?: 7 DAYS

## 2023-06-19 SDOH — ECONOMIC STABILITY: HOUSING INSECURITY
IN THE LAST 12 MONTHS, WAS THERE A TIME WHEN YOU DID NOT HAVE A STEADY PLACE TO SLEEP OR SLEPT IN A SHELTER (INCLUDING NOW)?: NO

## 2023-06-19 SDOH — ECONOMIC STABILITY: FOOD INSECURITY: WITHIN THE PAST 12 MONTHS, THE FOOD YOU BOUGHT JUST DIDN'T LAST AND YOU DIDN'T HAVE MONEY TO GET MORE.: NEVER TRUE

## 2023-06-19 SDOH — ECONOMIC STABILITY: TRANSPORTATION INSECURITY
IN THE PAST 12 MONTHS, HAS THE LACK OF TRANSPORTATION KEPT YOU FROM MEDICAL APPOINTMENTS OR FROM GETTING MEDICATIONS?: YES

## 2023-06-19 SDOH — HEALTH STABILITY: PHYSICAL HEALTH: ON AVERAGE, HOW MANY MINUTES DO YOU ENGAGE IN EXERCISE AT THIS LEVEL?: 30 MIN

## 2023-06-19 SDOH — ECONOMIC STABILITY: TRANSPORTATION INSECURITY
IN THE PAST 12 MONTHS, HAS LACK OF RELIABLE TRANSPORTATION KEPT YOU FROM MEDICAL APPOINTMENTS, MEETINGS, WORK OR FROM GETTING THINGS NEEDED FOR DAILY LIVING?: NO

## 2023-06-19 SDOH — ECONOMIC STABILITY: INCOME INSECURITY: HOW HARD IS IT FOR YOU TO PAY FOR THE VERY BASICS LIKE FOOD, HOUSING, MEDICAL CARE, AND HEATING?: SOMEWHAT HARD

## 2023-06-19 SDOH — ECONOMIC STABILITY: TRANSPORTATION INSECURITY
IN THE PAST 12 MONTHS, HAS LACK OF TRANSPORTATION KEPT YOU FROM MEETINGS, WORK, OR FROM GETTING THINGS NEEDED FOR DAILY LIVING?: NO

## 2023-06-19 SDOH — ECONOMIC STABILITY: FOOD INSECURITY: WITHIN THE PAST 12 MONTHS, YOU WORRIED THAT YOUR FOOD WOULD RUN OUT BEFORE YOU GOT MONEY TO BUY MORE.: NEVER TRUE

## 2023-06-19 SDOH — ECONOMIC STABILITY: HOUSING INSECURITY

## 2023-06-19 SDOH — ECONOMIC STABILITY: INCOME INSECURITY: IN THE LAST 12 MONTHS, WAS THERE A TIME WHEN YOU WERE NOT ABLE TO PAY THE MORTGAGE OR RENT ON TIME?: NO

## 2023-06-19 SDOH — HEALTH STABILITY: MENTAL HEALTH
STRESS IS WHEN SOMEONE FEELS TENSE, NERVOUS, ANXIOUS, OR CAN'T SLEEP AT NIGHT BECAUSE THEIR MIND IS TROUBLED. HOW STRESSED ARE YOU?: NOT AT ALL

## 2023-06-19 ASSESSMENT — LIFESTYLE VARIABLES
HOW OFTEN DO YOU HAVE SIX OR MORE DRINKS ON ONE OCCASION: NEVER
HOW OFTEN DO YOU HAVE A DRINK CONTAINING ALCOHOL: 2-4 TIMES A MONTH
SKIP TO QUESTIONS 9-10: 1
AUDIT-C TOTAL SCORE: 2
HOW MANY STANDARD DRINKS CONTAINING ALCOHOL DO YOU HAVE ON A TYPICAL DAY: 1 OR 2

## 2023-06-19 ASSESSMENT — SOCIAL DETERMINANTS OF HEALTH (SDOH)
DO YOU BELONG TO ANY CLUBS OR ORGANIZATIONS SUCH AS CHURCH GROUPS UNIONS, FRATERNAL OR ATHLETIC GROUPS, OR SCHOOL GROUPS?: NO
IN A TYPICAL WEEK, HOW MANY TIMES DO YOU TALK ON THE PHONE WITH FAMILY, FRIENDS, OR NEIGHBORS?: MORE THAN THREE TIMES A WEEK
HOW OFTEN DO YOU ATTENT MEETINGS OF THE CLUB OR ORGANIZATION YOU BELONG TO?: NEVER
HOW OFTEN DO YOU GET TOGETHER WITH FRIENDS OR RELATIVES?: PATIENT DECLINED
DO YOU BELONG TO ANY CLUBS OR ORGANIZATIONS SUCH AS CHURCH GROUPS UNIONS, FRATERNAL OR ATHLETIC GROUPS, OR SCHOOL GROUPS?: NO
HOW OFTEN DO YOU HAVE SIX OR MORE DRINKS ON ONE OCCASION: NEVER
HOW OFTEN DO YOU HAVE A DRINK CONTAINING ALCOHOL: 2-4 TIMES A MONTH
HOW OFTEN DO YOU GET TOGETHER WITH FRIENDS OR RELATIVES?: PATIENT DECLINED
IN A TYPICAL WEEK, HOW MANY TIMES DO YOU TALK ON THE PHONE WITH FAMILY, FRIENDS, OR NEIGHBORS?: MORE THAN THREE TIMES A WEEK
HOW OFTEN DO YOU ATTEND CHURCH OR RELIGIOUS SERVICES?: NEVER
HOW HARD IS IT FOR YOU TO PAY FOR THE VERY BASICS LIKE FOOD, HOUSING, MEDICAL CARE, AND HEATING?: SOMEWHAT HARD
HOW OFTEN DO YOU ATTEND CHURCH OR RELIGIOUS SERVICES?: NEVER
WITHIN THE PAST 12 MONTHS, YOU WORRIED THAT YOUR FOOD WOULD RUN OUT BEFORE YOU GOT THE MONEY TO BUY MORE: NEVER TRUE
HOW MANY DRINKS CONTAINING ALCOHOL DO YOU HAVE ON A TYPICAL DAY WHEN YOU ARE DRINKING: 1 OR 2
HOW OFTEN DO YOU ATTENT MEETINGS OF THE CLUB OR ORGANIZATION YOU BELONG TO?: NEVER

## 2023-06-26 ENCOUNTER — OFFICE VISIT (OUTPATIENT)
Dept: MEDICAL GROUP | Facility: PHYSICIAN GROUP | Age: 68
End: 2023-06-26
Payer: MEDICARE

## 2023-06-26 VITALS
WEIGHT: 292 LBS | HEART RATE: 92 BPM | BODY MASS INDEX: 40.88 KG/M2 | TEMPERATURE: 97.6 F | RESPIRATION RATE: 14 BRPM | DIASTOLIC BLOOD PRESSURE: 84 MMHG | SYSTOLIC BLOOD PRESSURE: 146 MMHG | HEIGHT: 71 IN | OXYGEN SATURATION: 94 %

## 2023-06-26 DIAGNOSIS — I10 PRIMARY HYPERTENSION: Chronic | ICD-10-CM

## 2023-06-26 DIAGNOSIS — E78.5 DYSLIPIDEMIA: ICD-10-CM

## 2023-06-26 DIAGNOSIS — E66.01 MORBID OBESITY (HCC): Chronic | ICD-10-CM

## 2023-06-26 DIAGNOSIS — I10 HYPERTENSION, UNSPECIFIED TYPE: ICD-10-CM

## 2023-06-26 DIAGNOSIS — R73.03 PREDIABETES: ICD-10-CM

## 2023-06-26 PROCEDURE — 99214 OFFICE O/P EST MOD 30 MIN: CPT | Performed by: FAMILY MEDICINE

## 2023-06-26 PROCEDURE — 3077F SYST BP >= 140 MM HG: CPT | Performed by: FAMILY MEDICINE

## 2023-06-26 PROCEDURE — 3079F DIAST BP 80-89 MM HG: CPT | Performed by: FAMILY MEDICINE

## 2023-06-26 RX ORDER — VALSARTAN AND HYDROCHLOROTHIAZIDE 160; 25 MG/1; MG/1
1 TABLET ORAL DAILY
Qty: 90 TABLET | Refills: 3 | Status: SHIPPED | OUTPATIENT
Start: 2023-06-26 | End: 2023-07-09 | Stop reason: SDUPTHER

## 2023-06-26 RX ORDER — ATORVASTATIN CALCIUM 20 MG/1
20 TABLET, FILM COATED ORAL DAILY
Qty: 90 TABLET | Refills: 3 | Status: SHIPPED | OUTPATIENT
Start: 2023-06-26 | End: 2023-07-09 | Stop reason: SDUPTHER

## 2023-06-26 ASSESSMENT — PATIENT HEALTH QUESTIONNAIRE - PHQ9: CLINICAL INTERPRETATION OF PHQ2 SCORE: 0

## 2023-06-26 ASSESSMENT — FIBROSIS 4 INDEX: FIB4 SCORE: 1.666666666666666667
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
## 2023-07-09 DIAGNOSIS — E78.5 DYSLIPIDEMIA: ICD-10-CM

## 2023-07-09 DIAGNOSIS — I10 PRIMARY HYPERTENSION: Chronic | ICD-10-CM

## 2023-07-11 RX ORDER — VALSARTAN AND HYDROCHLOROTHIAZIDE 160; 25 MG/1; MG/1
1 TABLET ORAL DAILY
Qty: 90 TABLET | Refills: 3 | Status: SHIPPED | OUTPATIENT
Start: 2023-07-11

## 2023-07-11 RX ORDER — ATORVASTATIN CALCIUM 20 MG/1
20 TABLET, FILM COATED ORAL DAILY
Qty: 90 TABLET | Refills: 3 | Status: SHIPPED | OUTPATIENT
Start: 2023-07-11

## 2023-07-20 ENCOUNTER — TELEPHONE (OUTPATIENT)
Dept: HEALTH INFORMATION MANAGEMENT | Facility: OTHER | Age: 68
End: 2023-07-20
Payer: MEDICARE

## 2023-08-22 ENCOUNTER — OFFICE VISIT (OUTPATIENT)
Dept: MEDICAL GROUP | Facility: PHYSICIAN GROUP | Age: 68
End: 2023-08-22
Payer: MEDICARE

## 2023-08-22 VITALS
SYSTOLIC BLOOD PRESSURE: 130 MMHG | TEMPERATURE: 98.4 F | HEIGHT: 71 IN | BODY MASS INDEX: 41.69 KG/M2 | RESPIRATION RATE: 16 BRPM | DIASTOLIC BLOOD PRESSURE: 72 MMHG | WEIGHT: 297.8 LBS | HEART RATE: 90 BPM | OXYGEN SATURATION: 98 %

## 2023-08-22 DIAGNOSIS — Z12.11 COLON CANCER SCREENING: ICD-10-CM

## 2023-08-22 DIAGNOSIS — R73.03 PREDIABETES: ICD-10-CM

## 2023-08-22 DIAGNOSIS — I10 PRIMARY HYPERTENSION: ICD-10-CM

## 2023-08-22 DIAGNOSIS — E66.01 MORBID OBESITY (HCC): ICD-10-CM

## 2023-08-22 DIAGNOSIS — Z00.00 MEDICARE ANNUAL WELLNESS VISIT, SUBSEQUENT: ICD-10-CM

## 2023-08-22 DIAGNOSIS — K42.9 UMBILICAL HERNIA WITHOUT OBSTRUCTION AND WITHOUT GANGRENE: ICD-10-CM

## 2023-08-22 DIAGNOSIS — E78.5 DYSLIPIDEMIA: ICD-10-CM

## 2023-08-22 PROCEDURE — G0439 PPPS, SUBSEQ VISIT: HCPCS | Performed by: FAMILY MEDICINE

## 2023-08-22 PROCEDURE — 3078F DIAST BP <80 MM HG: CPT | Performed by: FAMILY MEDICINE

## 2023-08-22 PROCEDURE — 3075F SYST BP GE 130 - 139MM HG: CPT | Performed by: FAMILY MEDICINE

## 2023-08-22 ASSESSMENT — FIBROSIS 4 INDEX: FIB4 SCORE: 1.666666666666666667

## 2023-08-22 ASSESSMENT — PATIENT HEALTH QUESTIONNAIRE - PHQ9: CLINICAL INTERPRETATION OF PHQ2 SCORE: 0

## 2023-08-22 ASSESSMENT — ENCOUNTER SYMPTOMS: GENERAL WELL-BEING: GOOD

## 2023-08-22 ASSESSMENT — ACTIVITIES OF DAILY LIVING (ADL): BATHING_REQUIRES_ASSISTANCE: 0

## 2023-08-22 NOTE — PROGRESS NOTES
Chief Complaint   Patient presents with    Annual Exam     HPI:  Darci Davis is a 68 y.o. here for Medicare Annual Wellness Visit     Patient Active Problem List    Diagnosis Date Noted    History of tobacco use 03/16/2022    Macular degeneration 05/19/2021    Knee pain, right 10/04/2019    Neck pain 02/20/2016    Hypogonadism male 01/06/2016    BPH (benign prostatic hypertrophy) 05/28/2015    Renal cyst 12/09/2013    Low back pain 12/10/2012    Hypertension 05/04/2009    Morbid obesity (HCC) 05/04/2009    S/p knee left replacement 05/04/2009    Allergic rhinitis 05/04/2009    Preventative health care 05/04/2009    Dyslipidemia 05/04/2009     Current Outpatient Medications   Medication Sig Dispense Refill    valsartan-hydrochlorothiazide (DIOVAN-HCT) 160-25 MG per tablet Take 1 Tablet by mouth every day. 90 Tablet 3    atorvastatin (LIPITOR) 20 MG Tab Take 1 Tablet by mouth every day. 90 Tablet 3    Pegcetacoplan, Ophthalmic, (SYFOVRE) 15 MG/0.1ML Solution       triamcinolone acetonide (KENALOG) 0.1 % Cream Apply affected area bid prn itching 45 g 4    Testosterone (ANDROGEL PUMP) 20.25 MG/ACT (1.62%) Gel AndroGel 20.25 mg/1.25 gram per pump act. (1.62 %) transdermal gel      ipratropium (ATROVENT) 0.03 % Solution Administer 2 Sprays into affected nostril(S) every 12 hours. 30 mL 6    FREESTYLE LITE strip USE TO CHECK BLOOD SUGAR ONCE DAILY BEFORE BREAKFAST. 50 Strip 11    Blood Glucose Monitoring Suppl (FREESTYLE LITE) Device E11.9 1 Each 0    Lancets Freestyle lite meter lancets, check blood sugar once a day before breakfast, E11.9 100 Each 11    Probiotic Product (PRO-BIOTIC BLEND PO) Take  by mouth.      Cholecalciferol (VITAMIN D) 2000 UNITS Cap Take  by mouth every day.       No current facility-administered medications for this visit.          Current supplements as per medication list.     Allergies: Ace inhibitors, Latex, Pcn [penicillins], and Zocor [simvastatin]    Current social  contact/activities:   He  reports that he quit smoking about 27 years ago. His smoking use included cigarettes. He started smoking about 42 years ago. He has a 15.0 pack-year smoking history. He has never used smokeless tobacco. He reports that he does not drink alcohol and does not use drugs.  Counseling given: Not Answered  Tobacco comments: 10 pack year history    ROS:    Gait: Uses no assistive device  Ostomy: No  Other tubes: No  Amputations: No  Chronic oxygen use: No  Last eye exam: follows with retinal specialist  Wears hearing aids: No   : Reports urinary leakage during the last 6 months that has not interfered at all with their daily activities or sleep.    Screening:  Depression Screening  Little interest or pleasure in doing things?  0 - not at all  Feeling down, depressed , or hopeless? 0 - not at all  Patient Health Questionnaire Score: 0     If depressive symptoms identified deferred to follow up visit unless specifically addressed in assessment and plan.    Screening for Cognitive Impairment  Three Minute Recall (daughter, heaven, mountain) 2/3    Jared clock face with all 12 numbers and set the hands to show 10 past 11.  No Patient unable too is being treated for retinal declined for now    Cognitive concerns identified deferred for follow up unless specifically addressed in assessment and plan.    Fall Risk Assessment  Has the patient had two or more falls in the last year or any fall with injury in the last year?  No    Safety Assessment  Throw rugs on floor.     Handrails on all stairs.     Good lighting in all hallways.     Difficulty hearing.  No  Patient counseled about all safety risks that were identified.    Functional Assessment ADLs  Are there any barriers preventing you from cooking for yourself or meeting nutritional needs?  No.    Are there any barriers preventing you from driving safely or obtaining transportation?  No.    Are there any barriers preventing you from using a telephone  or calling for help?  No.    Are there any barriers preventing you from shopping?  No.    Are there any barriers preventing you from taking care of your own finances?  No.    Are there any barriers preventing you from managing your medications?  No.    Are there any barriers preventing you from showering, bathing or dressing yourself?  No.    Are you currently engaging in any exercise or physical activity?  Yes.  Walking 6 Acers   What is your perception of your health?  Good    Advance Care Planning  Do you have an Advance Directive, Living Will, Durable Power of , or POLST? No                 Health Maintenance Summary            Overdue - Annual Wellness Visit (Every 366 Days) Overdue - never done      No completion history exists for this topic.              Overdue - Zoster (Shingles) Vaccines (2 of 3) Overdue since 12/4/2014      10/09/2014  Imm Admin: Zoster Vaccine Live (ZVL) (Zostavax) - HISTORICAL DATA              Overdue - Abdominal Aortic Aneurysm (AAA) Screening (Once) Overdue since 4/18/2018 12/07/2013  US-ABDOMEN COMPLETE SURVEY              Overdue - COVID-19 Vaccine (4 - Moderna series) Overdue since 8/1/2022 06/06/2022  Imm Admin: MODERNA SARS-COV-2 VACCINE (12+)    04/10/2021  Imm Admin: MODERNA SARS-COV-2 VACCINE (12+)    03/12/2021  Imm Admin: MODERNA SARS-COV-2 VACCINE (12+)              Overdue - Colorectal Cancer Screening (Colonoscopy - Every 10 Years) Overdue since 12/19/2022 12/19/2012  Colonoscopy (Patient Declined - pt did not contact [facility])              Overdue - Diabetes: Monofilament / LE Exam (Yearly) Order placed this encounter      03/07/2022  Diabetic Monofilament Lower Extremity Exam              Influenza Vaccine (1) Next due on 9/1/2023 11/13/2018  Imm Admin: Influenza Vaccine Quad Inj (Pf)    12/29/2015  Imm Admin: Influenza Vaccine Quad Inj (Pf)    10/09/2014  Imm Admin: Influenza Vaccine Quad Inj (Pf)    11/26/2013  Imm Admin: INFLUENZA TIV (IM)     11/26/2013  Imm Admin: Influenza Seasonal Injectable - Historical Data    Only the first 5 history entries have been loaded, but more history exists.              A1c Screening (Every 6 Months) Next due on 12/2/2023 06/02/2023  HEMOGLOBIN A1C    03/16/2022  HEMOGLOBIN A1C    02/18/2021  HEMOGLOBIN A1C    07/16/2020  HEMOGLOBIN A1C    06/07/2019  HEMOGLOBIN A1C    Only the first 5 history entries have been loaded, but more history exists.              Diabetes: Retinopathy Screening (Yearly) Next due on 5/9/2024 05/09/2023  AMB EXTERNAL RETINAL SCREENING RESULTS    01/16/2023  RETINAL SCREENING RESULTS    08/25/2022  AMB EXTERNAL RETINAL SCREENING RESULTS    08/25/2022  AMB EXTERNAL RETINAL SCREENING RESULTS    08/25/2022  AMB EXTERNAL RETINAL SCREENING RESULTS    Only the first 5 history entries have been loaded, but more history exists.              Fasting Lipid Profile (Yearly) Tentatively due on 6/2/2024 06/02/2023  Lipid Profile    03/16/2022  Lipid Profile    02/18/2021  Lipid Profile    07/16/2020  Lipid Profile    06/07/2019  Lipid Profile    Only the first 5 history entries have been loaded, but more history exists.              Diabetes: Urine Protein Screening (Yearly) Next due on 6/2/2024 06/02/2023  MICROALBUMIN CREAT RATIO URINE    03/16/2022  MICROALBUMIN CREAT RATIO URINE    02/18/2021  MICROALBUMIN CREAT RATIO URINE    07/16/2020  MICROALBUMIN CREAT RATIO URINE    06/07/2019  MICROALBUMIN CREAT RATIO URINE    Only the first 5 history entries have been loaded, but more history exists.              SERUM CREATININE (Yearly) Next due on 6/2/2024 06/02/2023  Comp Metabolic Panel    03/16/2022  Comp Metabolic Panel    02/18/2021  Comp Metabolic Panel    07/16/2020  Comp Metabolic Panel    06/07/2019  Comp Metabolic Panel    Only the first 5 history entries have been loaded, but more history exists.              IMM DTaP/Tdap/Td Vaccine (2 - Td or Tdap) Next due on 5/28/2025       2015  Imm Admin: Tdap Vaccine    2010  Imm Admin: TD Vaccine              Hepatitis C Screening  Tentatively Complete      2019  Hepatitis C Antibody component of HEP C VIRUS ANTIBODY              Pneumococcal Vaccine: 65+ Years (Series Information) Completed      2020  Imm Admin: Pneumococcal polysaccharide vaccine (PPSV-23)    2019  Imm Admin: Pneumococcal Conjugate Vaccine (Prevnar/PCV-13)              Hepatitis A Vaccine (Hep A) (Series Information) Aged Out      No completion history exists for this topic.              Hepatitis B Vaccine (Hep B) (Series Information) Aged Out      No completion history exists for this topic.              HPV Vaccines (Series Information) Aged Out      No completion history exists for this topic.              Polio Vaccine (Inactivated Polio) (Series Information) Aged Out      No completion history exists for this topic.              IMM MENINGOCOCCAL ACWY VACCINE (Series Information) Aged Out      No completion history exists for this topic.                    Patient Care Team:  Damien Bell M.D. as PCP - General (Family Medicine)  Damien Bell M.D. as PCP - [place] [facility] (Family Medicine)    Social History     Tobacco Use    Smoking status: Former     Current packs/day: 0.00     Average packs/day: 1 pack/day for 15.0 years (15.0 ttl pk-yrs)     Types: Cigarettes     Start date: 1981     Quit date: 1996     Years since quittin.6    Smokeless tobacco: Never    Tobacco comments:     10 pack year history   Substance Use Topics    Alcohol use: No     Alcohol/week: 0.0 oz     Comment: none    Drug use: No     Family History   Problem Relation Age of Onset    Cancer Brother         lung cancer     He  has a past medical history of Accessory skin tags (2018), Allergic rhinitis (2009), Anesthesia, Degenerative arthritis of knee (2009), Dental disorder, Dyslipidemia (2009), High cholesterol, Hypertension (2009),  "Morbid obesity (HCC) (5/4/2009), Plantar fasciitis (5/4/2009), Preventative health care (5/4/2009), S/P lateral meniscectomy of right knee (5/4/2009), Seborrheic keratoses (8/20/2018), and Sleep apnea (5/4/2009).    He has no past medical history of COPD.   Past Surgical History:   Procedure Laterality Date    KNEE ARTHROPLASTY TOTAL Left 7/11/2016    Procedure: KNEE ARTHROPLASTY TOTAL;  Surgeon: Madhav Iqbal M.D.;  [facility];  Service:     KNEE ARTHROSCOPY Left 6/2010    left knee    KNEE ARTHROSCOPY Right 6/2008    right knee     Exam:   /72 (BP Location: Left arm, Patient Position: Sitting, BP Cuff Size: Adult)   Pulse 90   Temp 36.9 °C (98.4 °F) (Temporal)   Resp 16   Ht 1.803 m (5' 11\")   Wt (!) 135 kg (297 lb 12.8 oz)   SpO2 98%  Body mass index is 41.53 kg/m².    Hearing good.    Dentition good  Alert, oriented in no acute distress.  Eye contact is good, speech goal directed, affect calm    Assessment and Plan.   1. Medicare annual wellness visit, subsequent  Services suggested: No services needed at this time  Health Care Screening: Age-appropriate preventive services recommended by USPTF and ACIP covered by Medicare were discussed today. Services ordered if indicated and agreed upon by the patient.  Referrals offered: Community-based lifestyle interventions to reduce health risks and promote self-management and wellness, fall prevention, nutrition, physical activity, tobacco-use cessation, weight loss, and mental health services as per orders if indicated.    Discussion today about general wellness and lifestyle habits:    Prevent falls and reduce trip hazards; Cautioned about securing or removing rugs.  Have a working fire alarm and carbon monoxide detector;   Engage in regular physical activity and social activities     Follow-up: 6 months    2. Umbilical hernia without obstruction and without gangrene  Asymptomatic, chronic, refer to gen surg for consultation  - " Referral to General Surgery    3. Morbid obesity (HCC)  4. Primary hypertension  - CBC WITH DIFFERENTIAL; Future  - Comp Metabolic Panel; Future    5. Dyslipidemia  - Lipid Profile; Future    6. Prediabetes  - HEMOGLOBIN A1C; Future    7. Colon cancer screening  - Referral to GI for Colonoscopy    Obtain labs prior to 6 mo f/u visit

## 2023-10-09 ENCOUNTER — OFFICE VISIT (OUTPATIENT)
Dept: URGENT CARE | Facility: PHYSICIAN GROUP | Age: 68
End: 2023-10-09
Payer: MEDICARE

## 2023-10-09 VITALS
WEIGHT: 297 LBS | OXYGEN SATURATION: 96 % | SYSTOLIC BLOOD PRESSURE: 140 MMHG | DIASTOLIC BLOOD PRESSURE: 80 MMHG | BODY MASS INDEX: 41.58 KG/M2 | HEART RATE: 82 BPM | TEMPERATURE: 98.7 F | HEIGHT: 71 IN | RESPIRATION RATE: 16 BRPM

## 2023-10-09 DIAGNOSIS — U07.1 POSITIVE SELF-ADMINISTERED ANTIGEN TEST FOR COVID-19: ICD-10-CM

## 2023-10-09 PROCEDURE — 3079F DIAST BP 80-89 MM HG: CPT

## 2023-10-09 PROCEDURE — 3077F SYST BP >= 140 MM HG: CPT

## 2023-10-09 PROCEDURE — 99214 OFFICE O/P EST MOD 30 MIN: CPT

## 2023-10-09 RX ORDER — HYDROCODONE BITARTRATE AND ACETAMINOPHEN 10; 325 MG/1; MG/1
TABLET ORAL
COMMUNITY
End: 2023-10-09

## 2023-10-09 RX ORDER — METHOCARBAMOL 750 MG/1
TABLET, FILM COATED ORAL
COMMUNITY
End: 2023-10-09

## 2023-10-09 RX ORDER — TAMSULOSIN HYDROCHLORIDE 0.4 MG/1
1 CAPSULE ORAL
COMMUNITY
End: 2023-10-09

## 2023-10-09 RX ORDER — ZOLPIDEM TARTRATE 5 MG/1
TABLET ORAL
COMMUNITY

## 2023-10-09 RX ORDER — ATORVASTATIN CALCIUM 20 MG/1
1 TABLET, FILM COATED ORAL
COMMUNITY

## 2023-10-09 RX ORDER — NAPROXEN 500 MG/1
TABLET ORAL
COMMUNITY
End: 2023-10-09

## 2023-10-09 RX ORDER — VALSARTAN AND HYDROCHLOROTHIAZIDE 160; 25 MG/1; MG/1
1 TABLET ORAL
COMMUNITY

## 2023-10-09 RX ORDER — ONDANSETRON 4 MG/1
TABLET, FILM COATED ORAL
COMMUNITY
End: 2023-10-09

## 2023-10-09 RX ORDER — PRAVASTATIN SODIUM 20 MG
1 TABLET ORAL
COMMUNITY

## 2023-10-09 RX ORDER — OXYCODONE AND ACETAMINOPHEN 10; 325 MG/1; MG/1
TABLET ORAL
COMMUNITY
End: 2023-10-09

## 2023-10-09 RX ORDER — ALBUTEROL SULFATE 90 UG/1
2 AEROSOL, METERED RESPIRATORY (INHALATION) EVERY 6 HOURS PRN
Qty: 8.5 G | Refills: 0 | Status: SHIPPED | OUTPATIENT
Start: 2023-10-09

## 2023-10-09 ASSESSMENT — ENCOUNTER SYMPTOMS
SHORTNESS OF BREATH: 0
CHILLS: 0
SPUTUM PRODUCTION: 1
FEVER: 0
COUGH: 1
SORE THROAT: 1
WHEEZING: 0
MYALGIAS: 0

## 2023-10-09 ASSESSMENT — FIBROSIS 4 INDEX: FIB4 SCORE: 1.666666666666666667

## 2023-10-09 NOTE — PROGRESS NOTES
Subjective:     CHIEF COMPLAINT  Chief Complaint   Patient presents with    Coronavirus Screening     Tested positive 10/9/23  Would like anti viral   Watery eyes, scratchy throat ,runny nose, x4 days        HPI  Darci Davis is a very pleasant 68 y.o. male who presents with 4 days of a cough, runny nose, and sore throat.  He tested positive for COVID at home today.  He reports that he has been taking TheraFlu for his cough with a substantial improvement in symptoms.  He states that he still has a runny nose and sore throat, but otherwise feels better than he was previously.  He has not had any fevers, body aches, or chills.  He is interested in taking COVID antiviral medications.    REVIEW OF SYSTEMS  Review of Systems   Constitutional:  Negative for chills and fever.   HENT:  Positive for congestion and sore throat.    Respiratory:  Positive for cough (Improving) and sputum production. Negative for shortness of breath and wheezing.    Cardiovascular:  Negative for chest pain.   Musculoskeletal:  Negative for myalgias.       PAST MEDICAL HISTORY  Patient Active Problem List    Diagnosis Date Noted    History of tobacco use 03/16/2022    Macular degeneration 05/19/2021    Knee pain, right 10/04/2019    Neck pain 02/20/2016    Hypogonadism male 01/06/2016    BPH (benign prostatic hypertrophy) 05/28/2015    Renal cyst 12/09/2013    Low back pain 12/10/2012    Hypertension 05/04/2009    Morbid obesity (HCC) 05/04/2009    S/p knee left replacement 05/04/2009    Allergic rhinitis 05/04/2009    Preventative health care 05/04/2009    Dyslipidemia 05/04/2009       SURGICAL HISTORY   has a past surgical history that includes knee arthroscopy (Right, 6/2008); knee arthroscopy (Left, 6/2010); and knee arthroplasty total (Left, 7/11/2016).    ALLERGIES  Allergies   Allergen Reactions    Ace Inhibitors     Latex Rash     .    Pcn [Penicillins] Rash     .    Zocor [Simvastatin]        CURRENT MEDICATIONS  Home Medications        Reviewed by Kassidy Partida P.A.-C. (Physician Assistant) on 10/09/23 at 1549  Med List Status: <None>     Medication Last Dose Status   atorvastatin (LIPITOR) 20 MG Tab Taking Active   atorvastatin (LIPITOR) 20 MG Tab Not Taking Active   Blood Glucose Monitoring Suppl (FREESTYLE LITE) Device Taking Active   Cholecalciferol (VITAMIN D) 2000 UNITS Cap Not Taking Active   FREESTYLE LITE strip Not Taking Active   HYDROcodone/acetaminophen (NORCO)  MG Tab Not Taking Active   influenza vaccine quad (FLUZONE/FLUARIX) 0.5 ML Suspension Prefilled Syringe injection Not Taking Active   ipratropium (ATROVENT) 0.03 % Solution Not Taking Active   Lancets Not Taking Active   methocarbamol (ROBAXIN) 750 MG Tab Not Taking Active   naproxen (NAPROSYN) 500 MG Tab Not Taking Active   ondansetron (ZOFRAN) 4 MG Tab tablet Not Taking Active   oxyCODONE-acetaminophen (PERCOCET-10)  MG Tab Not Taking Active   Pegcetacoplan, Ophthalmic, (SYFOVRE) 15 MG/0.1ML Solution Taking Active   pravastatin (PRAVACHOL) 20 MG Tab Not Taking Active   Probiotic Product (PRO-BIOTIC BLEND PO) Not Taking Active   tamsulosin (FLOMAX) 0.4 MG capsule Not Taking Active   Testosterone (ANDROGEL PUMP) 20.25 MG/ACT (1.62%) Gel Not Taking Active   triamcinolone acetonide (KENALOG) 0.1 % Cream Not Taking Active   valsartan-hydrochlorothiazide (DIOVAN-HCT) 160-25 MG per tablet Not Taking Active   valsartan-hydrochlorothiazide (DIOVAN-HCT) 160-25 MG per tablet Not Taking Active   zolpidem (AMBIEN) 5 MG Tab Not Taking Active                    SOCIAL HISTORY  Social History     Tobacco Use    Smoking status: Former     Current packs/day: 0.00     Average packs/day: 1 pack/day for 15.0 years (15.0 ttl pk-yrs)     Types: Cigarettes     Start date: 1981     Quit date: 1996     Years since quittin.7    Smokeless tobacco: Never    Tobacco comments:     10 pack year history   Substance and Sexual Activity    Alcohol use: No     Alcohol/week:  "0.0 oz     Comment: none    Drug use: No    Sexual activity: Not on file       FAMILY HISTORY  Family History   Problem Relation Age of Onset    Cancer Brother         lung cancer          Objective:     VITAL SIGNS: BP (!) 140/80 (BP Location: Right arm, Patient Position: Sitting, BP Cuff Size: Adult)   Pulse 82   Temp 37.1 °C (98.7 °F) (Temporal)   Resp 16   Ht 1.803 m (5' 11\")   Wt (!) 135 kg (297 lb)   SpO2 96%   BMI 41.42 kg/m²     PHYSICAL EXAM  Physical Exam  Vitals reviewed.   Constitutional:       General: He is not in acute distress.     Appearance: Normal appearance. He is not ill-appearing or toxic-appearing.   HENT:      Head: Normocephalic and atraumatic.      Nose: Nose normal.      Mouth/Throat:      Mouth: Mucous membranes are moist.      Pharynx: No oropharyngeal exudate or posterior oropharyngeal erythema.      Comments: Uvula midline.  Tonsils not visible.  Eyes:      Conjunctiva/sclera: Conjunctivae normal.      Pupils: Pupils are equal, round, and reactive to light.   Cardiovascular:      Rate and Rhythm: Normal rate and regular rhythm.      Heart sounds: Normal heart sounds.   Pulmonary:      Effort: Pulmonary effort is normal. No respiratory distress.      Breath sounds: No stridor. Wheezing (Mild wheezing present in bilateral lung fields) present. No rhonchi or rales.   Skin:     General: Skin is warm and dry.      Coloration: Skin is not pale.   Neurological:      General: No focal deficit present.      Mental Status: He is alert and oriented to person, place, and time.   Psychiatric:         Mood and Affect: Mood normal.         Assessment/Plan:     1. Positive self-administered antigen test for COVID-19  - molnupiravir 200 MG capsule; Take 4 Capsules by mouth 2 times a day for 5 days. CAUTION: DO NOT USE IF PREGNANT OR PLANNING TO BECOME PREGNANT  Dispense: 40 Capsule; Refill: 0  - albuterol 108 (90 Base) MCG/ACT Aero Soln inhalation aerosol; Inhale 2 Puffs every 6 hours as needed " for Shortness of Breath.  Dispense: 8.5 g; Refill: 0    Other orders  - atorvastatin (LIPITOR) 20 MG Tab; Take 1 Tablet by mouth every day. (Patient not taking: Reported on 10/9/2023)  - pravastatin (PRAVACHOL) 20 MG Tab; Take 1 Tablet by mouth every day. (Patient not taking: Reported on 10/9/2023)  - zolpidem (AMBIEN) 5 MG Tab;  (Patient not taking: Reported on 10/9/2023)  - valsartan-hydrochlorothiazide (DIOVAN-HCT) 160-25 MG per tablet; Take 1 Tablet by mouth every day. (Patient not taking: Reported on 10/9/2023)  -Tylenol/ibuprofen OTC as needed for discomfort  -Warm salt water gargles for sore throat  -Isolate at home for the first 5 days from the onset of symptoms.  May return to normal activities on days 6 through 10 but must wear a mask  -Return to clinic/ER if any acute changes occur    MDM/Comments:  Patient has stable vital signs and is non-toxic appearing. Discussed supportive care with hydration, rest, Tylenol/Ibuprofen as needed.  Patient tested positive for COVID.  Shared decision making used and patient will be started on COVID antiviral medications given their past medical history and age.  Patient will be prescribed Lagevrio given his current medications and to avoid medication interactions.  Strict ER precautions discussed with patient if symptoms worsen in any way.  Isolation protocols discussed with patient.  Patient demonstrated understanding of treatment plan at this time and will RTC if symptoms worsen or fail to resolve.     Differential diagnosis, natural history, supportive care, and indications for immediate follow-up discussed. All questions answered. Patient agrees with the plan of care.    Follow-up as needed if symptoms worsen or fail to improve to PCP, Urgent care or Emergency Room.    I have personally reviewed prior external notes and test results pertinent to today's visit.  I have independently reviewed and interpreted all diagnostics ordered during this urgent care acute visit.    Discussed management options (risks,benefits, and alternatives to treatment). Pt expresses understanding and the treatment plan was agreed upon. Questions were encouraged and answered to pt's satisfaction.    Please note that this dictation was created using voice recognition software. I have made a reasonable attempt to correct obvious errors, but I expect that there are errors of grammar and possibly content that I did not discover before finalizing the note.

## 2024-05-08 ENCOUNTER — APPOINTMENT (OUTPATIENT)
Dept: MEDICAL GROUP | Facility: PHYSICIAN GROUP | Age: 69
End: 2024-05-08
Payer: MEDICARE

## 2024-05-25 ENCOUNTER — HOSPITAL ENCOUNTER (OUTPATIENT)
Dept: LAB | Facility: MEDICAL CENTER | Age: 69
End: 2024-05-25
Attending: FAMILY MEDICINE
Payer: MEDICARE

## 2024-05-25 DIAGNOSIS — E78.5 DYSLIPIDEMIA: ICD-10-CM

## 2024-05-25 DIAGNOSIS — I10 PRIMARY HYPERTENSION: ICD-10-CM

## 2024-05-25 DIAGNOSIS — R73.03 PREDIABETES: ICD-10-CM

## 2024-05-25 LAB
ALBUMIN SERPL BCP-MCNC: 3.9 G/DL (ref 3.2–4.9)
ALBUMIN/GLOB SERPL: 1.3 G/DL
ALP SERPL-CCNC: 67 U/L (ref 30–99)
ALT SERPL-CCNC: 11 U/L (ref 2–50)
ANION GAP SERPL CALC-SCNC: 13 MMOL/L (ref 7–16)
AST SERPL-CCNC: 17 U/L (ref 12–45)
BASOPHILS # BLD AUTO: 1 % (ref 0–1.8)
BASOPHILS # BLD: 0.09 K/UL (ref 0–0.12)
BILIRUB SERPL-MCNC: 0.5 MG/DL (ref 0.1–1.5)
BUN SERPL-MCNC: 20 MG/DL (ref 8–22)
CALCIUM ALBUM COR SERPL-MCNC: 10 MG/DL (ref 8.5–10.5)
CALCIUM SERPL-MCNC: 9.9 MG/DL (ref 8.5–10.5)
CHLORIDE SERPL-SCNC: 103 MMOL/L (ref 96–112)
CHOLEST SERPL-MCNC: 182 MG/DL (ref 100–199)
CO2 SERPL-SCNC: 25 MMOL/L (ref 20–33)
CREAT SERPL-MCNC: 0.88 MG/DL (ref 0.5–1.4)
EOSINOPHIL # BLD AUTO: 0.3 K/UL (ref 0–0.51)
EOSINOPHIL NFR BLD: 3.2 % (ref 0–6.9)
ERYTHROCYTE [DISTWIDTH] IN BLOOD BY AUTOMATED COUNT: 47 FL (ref 35.9–50)
EST. AVERAGE GLUCOSE BLD GHB EST-MCNC: 128 MG/DL
FASTING STATUS PATIENT QL REPORTED: NORMAL
GFR SERPLBLD CREATININE-BSD FMLA CKD-EPI: 92 ML/MIN/1.73 M 2
GLOBULIN SER CALC-MCNC: 3.1 G/DL (ref 1.9–3.5)
GLUCOSE SERPL-MCNC: 105 MG/DL (ref 65–99)
HBA1C MFR BLD: 6.1 % (ref 4–5.6)
HCT VFR BLD AUTO: 49.4 % (ref 42–52)
HDLC SERPL-MCNC: 51 MG/DL
HGB BLD-MCNC: 16.7 G/DL (ref 14–18)
IMM GRANULOCYTES # BLD AUTO: 0.03 K/UL (ref 0–0.11)
IMM GRANULOCYTES NFR BLD AUTO: 0.3 % (ref 0–0.9)
LDLC SERPL CALC-MCNC: 107 MG/DL
LYMPHOCYTES # BLD AUTO: 2.58 K/UL (ref 1–4.8)
LYMPHOCYTES NFR BLD: 27.2 % (ref 22–41)
MCH RBC QN AUTO: 32.2 PG (ref 27–33)
MCHC RBC AUTO-ENTMCNC: 33.8 G/DL (ref 32.3–36.5)
MCV RBC AUTO: 95.2 FL (ref 81.4–97.8)
MONOCYTES # BLD AUTO: 0.99 K/UL (ref 0–0.85)
MONOCYTES NFR BLD AUTO: 10.5 % (ref 0–13.4)
NEUTROPHILS # BLD AUTO: 5.48 K/UL (ref 1.82–7.42)
NEUTROPHILS NFR BLD: 57.8 % (ref 44–72)
NRBC # BLD AUTO: 0 K/UL
NRBC BLD-RTO: 0 /100 WBC (ref 0–0.2)
PLATELET # BLD AUTO: 271 K/UL (ref 164–446)
PMV BLD AUTO: 11.1 FL (ref 9–12.9)
POTASSIUM SERPL-SCNC: 4 MMOL/L (ref 3.6–5.5)
PROT SERPL-MCNC: 7 G/DL (ref 6–8.2)
RBC # BLD AUTO: 5.19 M/UL (ref 4.7–6.1)
SODIUM SERPL-SCNC: 141 MMOL/L (ref 135–145)
TRIGL SERPL-MCNC: 118 MG/DL (ref 0–149)
WBC # BLD AUTO: 9.5 K/UL (ref 4.8–10.8)

## 2024-05-29 ENCOUNTER — APPOINTMENT (OUTPATIENT)
Dept: MEDICAL GROUP | Facility: PHYSICIAN GROUP | Age: 69
End: 2024-05-29
Payer: MEDICARE

## 2024-05-29 VITALS
BODY MASS INDEX: 42.28 KG/M2 | OXYGEN SATURATION: 95 % | HEART RATE: 85 BPM | HEIGHT: 71 IN | TEMPERATURE: 98.8 F | DIASTOLIC BLOOD PRESSURE: 66 MMHG | RESPIRATION RATE: 18 BRPM | WEIGHT: 302 LBS | SYSTOLIC BLOOD PRESSURE: 116 MMHG

## 2024-05-29 DIAGNOSIS — I10 PRIMARY HYPERTENSION: Chronic | ICD-10-CM

## 2024-05-29 DIAGNOSIS — N40.1 BENIGN PROSTATIC HYPERPLASIA WITH URINARY FREQUENCY: ICD-10-CM

## 2024-05-29 DIAGNOSIS — M15.9 PRIMARY OSTEOARTHRITIS INVOLVING MULTIPLE JOINTS: ICD-10-CM

## 2024-05-29 DIAGNOSIS — L82.1 SEBORRHEIC KERATOSES: ICD-10-CM

## 2024-05-29 DIAGNOSIS — R35.0 BENIGN PROSTATIC HYPERPLASIA WITH URINARY FREQUENCY: ICD-10-CM

## 2024-05-29 DIAGNOSIS — E66.01 MORBID OBESITY (HCC): Chronic | ICD-10-CM

## 2024-05-29 DIAGNOSIS — E78.5 DYSLIPIDEMIA: ICD-10-CM

## 2024-05-29 RX ORDER — TAMSULOSIN HYDROCHLORIDE 0.4 MG/1
0.4 CAPSULE ORAL DAILY
Qty: 90 CAPSULE | Refills: 3 | Status: SHIPPED | OUTPATIENT
Start: 2024-05-29

## 2024-05-29 RX ORDER — ATORVASTATIN CALCIUM 20 MG/1
20 TABLET, FILM COATED ORAL DAILY
Qty: 100 TABLET | Refills: 3 | Status: SHIPPED | OUTPATIENT
Start: 2024-05-29

## 2024-05-29 RX ORDER — SEMAGLUTIDE 0.68 MG/ML
0.5 INJECTION, SOLUTION SUBCUTANEOUS
Qty: 9 ML | Refills: 11 | Status: SHIPPED | OUTPATIENT
Start: 2024-05-29

## 2024-05-29 RX ORDER — VALSARTAN AND HYDROCHLOROTHIAZIDE 160; 25 MG/1; MG/1
1 TABLET ORAL DAILY
Qty: 100 TABLET | Refills: 3 | Status: SHIPPED | OUTPATIENT
Start: 2024-05-29

## 2024-05-29 ASSESSMENT — FIBROSIS 4 INDEX: FIB4 SCORE: 1.342893700757788533

## 2024-05-29 NOTE — PROGRESS NOTES
"CHIEF COMPLAINT / REASON FOR VISIT  Darci Davis is a 69 y.o. male that presents today for   Chief Complaint   Patient presents with    Lab Results     HISTORY OF PRESENT ILLNESS  Decreased urinary stream, increased frequency. Chronic    OBJECTIVE   /66 (BP Location: Left arm, Patient Position: Sitting, BP Cuff Size: Large adult)   Pulse 85   Temp 37.1 °C (98.8 °F) (Temporal)   Resp 18   Ht 1.803 m (5' 11\")   Wt (!) 137 kg (302 lb)   SpO2 95%   BMI 42.12 kg/m²      PHYSICAL EXAM  Constitutional: Sitting comfortably, in no acute distress, responds to questions appropriately.  Heart: Regular S1 S2, no murmurs, rub, or gallops  Lungs: Clear to auscultation bilaterally, no wheezes, rales, or rhonchi  Extremities: No lower extremity edema  Skin: waxy brown stuck-on papular lesions scattered throughout exposed skin    ASSESSMENT & PLAN  1. Dyslipidemia  Chronic, stable, continue atorvastatin 20 mg daily.  - atorvastatin (LIPITOR) 20 MG Tab; Take 1 Tablet by mouth every day.  Dispense: 100 Tablet; Refill: 3    2. Primary hypertension  Chronic, controlled, continue valsartan-hydrochlorothiazide 160-25 mg daily.  - valsartan-hydrochlorothiazide (DIOVAN-HCT) 160-25 MG per tablet; Take 1 Tablet by mouth every day.  Dispense: 100 Tablet; Refill: 3    3. Morbid obesity (HCC)  Chronic, uncontrolled.  BMI 42.12.  Comorbidities of hypertension and dyslipidemia.  After discussion decided to reinitiate semaglutide 0.5 mg weekly for weight loss.  This is in conjunction with dietary caloric restriction and regular exercise  - Semaglutide,0.25 or 0.5MG/DOS, (OZEMPIC, 0.25 OR 0.5 MG/DOSE,) 2 MG/3ML Solution Pen-injector; Inject 0.5 mg under the skin every 7 days.  Dispense: 9 mL; Refill: 11    4. Primary osteoarthritis involving multiple joints  Chronic, stable.  Recommend Tylenol 1000 mg 3 times daily as needed.    5. Benign prostatic hyperplasia with urinary frequency  Chronic, uncontrolled, recommend reinitiation " of tamsulosin 0.4 mg daily.  - tamsulosin (FLOMAX) 0.4 MG capsule; Take 1 Capsule by mouth every day.  Dispense: 90 Capsule; Refill: 3    6. Seborrheic keratoses  Informed of benign nature, no intervention required

## 2024-08-05 ENCOUNTER — APPOINTMENT (OUTPATIENT)
Dept: RADIOLOGY | Facility: IMAGING CENTER | Age: 69
End: 2024-08-05
Attending: NURSE PRACTITIONER
Payer: MEDICARE

## 2024-08-05 ENCOUNTER — OFFICE VISIT (OUTPATIENT)
Dept: URGENT CARE | Facility: PHYSICIAN GROUP | Age: 69
End: 2024-08-05
Payer: MEDICARE

## 2024-08-05 VITALS
HEART RATE: 70 BPM | RESPIRATION RATE: 16 BRPM | WEIGHT: 292 LBS | SYSTOLIC BLOOD PRESSURE: 130 MMHG | TEMPERATURE: 98.2 F | OXYGEN SATURATION: 95 % | DIASTOLIC BLOOD PRESSURE: 70 MMHG | BODY MASS INDEX: 40.73 KG/M2

## 2024-08-05 DIAGNOSIS — M25.511 ACUTE PAIN OF RIGHT SHOULDER: ICD-10-CM

## 2024-08-05 PROCEDURE — 3078F DIAST BP <80 MM HG: CPT | Performed by: NURSE PRACTITIONER

## 2024-08-05 PROCEDURE — 99214 OFFICE O/P EST MOD 30 MIN: CPT | Performed by: NURSE PRACTITIONER

## 2024-08-05 PROCEDURE — 3075F SYST BP GE 130 - 139MM HG: CPT | Performed by: NURSE PRACTITIONER

## 2024-08-05 PROCEDURE — 73030 X-RAY EXAM OF SHOULDER: CPT | Mod: TC,RT | Performed by: RADIOLOGY

## 2024-08-05 RX ORDER — NAPROXEN 500 MG/1
500 TABLET ORAL EVERY 12 HOURS PRN
Qty: 30 TABLET | Refills: 1 | Status: SHIPPED | OUTPATIENT
Start: 2024-08-05

## 2024-08-05 ASSESSMENT — VISUAL ACUITY: OU: 1

## 2024-08-05 ASSESSMENT — ENCOUNTER SYMPTOMS
SENSORY CHANGE: 0
CONSTITUTIONAL NEGATIVE: 1
NEUROLOGICAL NEGATIVE: 1
WEAKNESS: 0

## 2024-08-05 ASSESSMENT — FIBROSIS 4 INDEX: FIB4 SCORE: 1.342893700757788533

## 2024-08-05 NOTE — PROGRESS NOTES
Subjective:     Darci Davis is a 69 y.o. male who presents for Shoulder Injury (Dropped fork lift when panel hit pt making him fall backwards, r shoulder pain x4 weeks )       Shoulder Injury   The right shoulder is affected.     Patient reports about 20 days ago, reports he was pulling some horse panels when the back of his heels tripped on some forks on the ground causing him to fall backwards.  Landed onto his right shoulder.  Has had pain, tenderness, and decreased range of motion.  Improving overall.  However, symptoms persist and comes in for evaluation.    Suffered a small abrasion at the right elbow which has healed.  Elbow feels fine.  Tdap received 5/28/2015 per chart review.    Review of Systems   Constitutional: Negative.    Musculoskeletal:         Per HPI   Neurological: Negative.  Negative for sensory change and weakness.   All other systems reviewed and are negative.    Refer to [facility] for additional details.    During this visit, appropriate PPE was worn, and hand hygiene was performed.    PMH:  has a past medical history of Accessory skin tags (8/20/2018), Allergic rhinitis (5/4/2009), Anesthesia, Degenerative arthritis of knee (5/4/2009), Dental disorder, Dyslipidemia (5/4/2009), High cholesterol, Hypertension (5/4/2009), Morbid obesity (HCC) (5/4/2009), Plantar fasciitis (5/4/2009), Preventative health care (5/4/2009), S/P lateral meniscectomy of right knee (5/4/2009), Seborrheic keratoses (8/20/2018), and Sleep apnea (5/4/2009).    He has no past medical history of COPD.    MEDS:   Current Outpatient Medications:     naproxen (NAPROSYN) 500 MG Tab, Take 1 Tablet by mouth every 12 hours as needed (Pain/inflammation)., Disp: 30 Tablet, Rfl: 1    atorvastatin (LIPITOR) 20 MG Tab, Take 1 Tablet by mouth every day., Disp: 100 Tablet, Rfl: 3    valsartan-hydrochlorothiazide (DIOVAN-HCT) 160-25 MG per tablet, Take 1 Tablet by mouth every day., Disp: 100 Tablet, Rfl: 3    Semaglutide,0.25 or  0.5MG/DOS, (OZEMPIC, 0.25 OR 0.5 MG/DOSE,) 2 MG/3ML Solution Pen-injector, Inject 0.5 mg under the skin every 7 days., Disp: 9 mL, Rfl: 11    albuterol 108 (90 Base) MCG/ACT Aero Soln inhalation aerosol, Inhale 2 Puffs every 6 hours as needed for Shortness of Breath., Disp: 8.5 g, Rfl: 0    Pegcetacoplan, Ophthalmic, (SYFOVRE) 15 MG/0.1ML Solution, , Disp: , Rfl:     Blood Glucose Monitoring Suppl (FREESTYLE LITE) Device, E11.9, Disp: 1 Each, Rfl: 0    tamsulosin (FLOMAX) 0.4 MG capsule, Take 1 Capsule by mouth every day. (Patient not taking: Reported on 8/5/2024), Disp: 90 Capsule, Rfl: 3    ALLERGIES:   Allergies   Allergen Reactions    Ace Inhibitors     Latex Rash     .    Pcn [Penicillins] Rash     .    Zocor [Simvastatin]      SURGHX:   Past Surgical History:   Procedure Laterality Date    KNEE ARTHROPLASTY TOTAL Left 7/11/2016    Procedure: KNEE ARTHROPLASTY TOTAL;  Surgeon: Madhav Iqbal M.D.;  Location: SURGERY [facility];  Service:     KNEE ARTHROSCOPY Left 6/2010    left knee    KNEE ARTHROSCOPY Right 6/2008    right knee     SOCHX:  reports that he quit smoking about 28 years ago. His smoking use included cigarettes. He started smoking about 43 years ago. He has a 15 pack-year smoking history. He has never used smokeless tobacco. He reports that he does not drink alcohol and does not use drugs.    FH: Per HPI as applicable/pertinent.      Objective:     /70 (BP Location: Left arm, Patient Position: Sitting, BP Cuff Size: Adult)   Pulse 70   Temp 36.8 °C (98.2 °F) (Temporal)   Resp 16   Wt (!) 132 kg (292 lb)   SpO2 95%   BMI 40.73 kg/m²     Physical Exam  Nursing note reviewed.   Constitutional:       General: He is not in acute distress.     Appearance: He is well-developed. He is not ill-appearing or toxic-appearing.   Eyes:      General: Vision grossly intact.   Cardiovascular:      Rate and Rhythm: Normal rate.      Pulses: Normal pulses.   Pulmonary:      Effort: Pulmonary effort  is normal. No respiratory distress.   Musculoskeletal:         General: No deformity.      Right shoulder: No swelling or deformity. Decreased range of motion (Flexion, abduction 90 degrees).      Right elbow: No swelling, deformity or lacerations (Healed abrasion). Normal range of motion. No tenderness.      Comments: R shoulder empty can: pain without weakness   Skin:     General: Skin is warm and dry.      Coloration: Skin is not pale.   Neurological:      Mental Status: He is alert and oriented to person, place, and time.      Motor: No weakness.   Psychiatric:         Behavior: Behavior normal. Behavior is cooperative.     X-ray of R shoulder:    Details    Reading Physician Reading Date Result Priority   Charles Traore M.D.  [phone] 8/5/2024      Narrative & Impression     8/5/2024 10:56 AM     HISTORY/REASON FOR EXAM:  Pain/Deformity Following Trauma.  Fall 4 weeks ago, RIGHT shoulder pain.     TECHNIQUE/EXAM DESCRIPTION AND NUMBER OF VIEWS:  3 views of the RIGHT shoulder.     COMPARISON: None     FINDINGS:  Clavicle is intact.  Degenerative change of AC joint.  Visualized proximal humerus is intact and normally located.  Visualized upper chest is unremarkable.     IMPRESSION:     1.  No fracture or dislocation of RIGHT shoulder.  2.  Degenerative change of AC joint.           Exam Ended: 08/05/24 10:57 AM Last Resulted: 08/05/24 10:59 AM        Radiology report and images reviewed by myself. Concur with findings.      Assessment/Plan:     1. Acute pain of right shoulder  - DX-SHOULDER 2+ RIGHT; Future  - naproxen (NAPROSYN) 500 MG Tab; Take 1 Tablet by mouth every 12 hours as needed (Pain/inflammation).  Dispense: 30 Tablet; Refill: 1    Rest, ice, compression, and elevation (RICE) and over-the-counter acetaminophen, per 's instructions, as needed for pain. May apply heat up to 20 minutes at a time. May alternate with ice. May use gentle massage, stretching, and range of motion exercises as  tolerated and as symptoms improve. Rx as above sent electronically. Stop ibuprofen.    Monitor. Follow up in 7-14 days if symptoms do not improve or sooner if symptoms change or worsen. Suggested [facility]. Patient does have an orthopedist he sees for his left knee in 2 weeks.    Differential diagnosis, natural history, supportive care, over-the-counter symptom management per 's instructions, close monitoring, and indications for immediate follow-up discussed.     All questions answered. Patient agrees with the plan of care.    Discharge summary provided.    Billing note: moderate complexity (independent interpretation) and moderate risk (Rx management). Established patient. [id number]. Please refer to LOS tool for details.

## 2024-08-05 NOTE — PATIENT INSTRUCTIONS
Details    Reading Physician Reading Date Result Priority   Charles Traore M.D.  [phone] 8/5/2024      Narrative & Impression     8/5/2024 10:56 AM     HISTORY/REASON FOR EXAM:  Pain/Deformity Following Trauma.  Fall 4 weeks ago, RIGHT shoulder pain.     TECHNIQUE/EXAM DESCRIPTION AND NUMBER OF VIEWS:  3 views of the RIGHT shoulder.     COMPARISON: None     FINDINGS:  Clavicle is intact.  Degenerative change of AC joint.  Visualized proximal humerus is intact and normally located.  Visualized upper chest is unremarkable.     IMPRESSION:     1.  No fracture or dislocation of RIGHT shoulder.  2.  Degenerative change of AC joint.           Exam Ended: 08/05/24 10:57 AM Last Resulted: 08/05/24 10:59 AM

## 2024-09-19 ENCOUNTER — DOCUMENTATION (OUTPATIENT)
Dept: HEALTH INFORMATION MANAGEMENT | Facility: OTHER | Age: 69
End: 2024-09-19
Payer: MEDICARE

## 2024-09-23 SDOH — ECONOMIC STABILITY: INCOME INSECURITY: IN THE LAST 12 MONTHS, WAS THERE A TIME WHEN YOU WERE NOT ABLE TO PAY THE MORTGAGE OR RENT ON TIME?: NO

## 2024-09-23 SDOH — HEALTH STABILITY: PHYSICAL HEALTH: ON AVERAGE, HOW MANY DAYS PER WEEK DO YOU ENGAGE IN MODERATE TO STRENUOUS EXERCISE (LIKE A BRISK WALK)?: 4 DAYS

## 2024-09-23 SDOH — ECONOMIC STABILITY: INCOME INSECURITY: HOW HARD IS IT FOR YOU TO PAY FOR THE VERY BASICS LIKE FOOD, HOUSING, MEDICAL CARE, AND HEATING?: NOT HARD AT ALL

## 2024-09-23 SDOH — HEALTH STABILITY: PHYSICAL HEALTH: ON AVERAGE, HOW MANY MINUTES DO YOU ENGAGE IN EXERCISE AT THIS LEVEL?: 20 MIN

## 2024-09-23 SDOH — ECONOMIC STABILITY: FOOD INSECURITY: WITHIN THE PAST 12 MONTHS, THE FOOD YOU BOUGHT JUST DIDN'T LAST AND YOU DIDN'T HAVE MONEY TO GET MORE.: NEVER TRUE

## 2024-09-23 SDOH — ECONOMIC STABILITY: FOOD INSECURITY: WITHIN THE PAST 12 MONTHS, YOU WORRIED THAT YOUR FOOD WOULD RUN OUT BEFORE YOU GOT MONEY TO BUY MORE.: NEVER TRUE

## 2024-09-23 SDOH — ECONOMIC STABILITY: TRANSPORTATION INSECURITY
IN THE PAST 12 MONTHS, HAS THE LACK OF TRANSPORTATION KEPT YOU FROM MEDICAL APPOINTMENTS OR FROM GETTING MEDICATIONS?: NO

## 2024-09-23 ASSESSMENT — LIFESTYLE VARIABLES
SKIP TO QUESTIONS 9-10: 1
HOW MANY STANDARD DRINKS CONTAINING ALCOHOL DO YOU HAVE ON A TYPICAL DAY: PATIENT DOES NOT DRINK
HOW OFTEN DO YOU HAVE SIX OR MORE DRINKS ON ONE OCCASION: NEVER
AUDIT-C TOTAL SCORE: 0
HOW OFTEN DO YOU HAVE A DRINK CONTAINING ALCOHOL: NEVER

## 2024-09-23 ASSESSMENT — SOCIAL DETERMINANTS OF HEALTH (SDOH)
HOW OFTEN DO YOU HAVE A DRINK CONTAINING ALCOHOL: NEVER
HOW OFTEN DO YOU HAVE SIX OR MORE DRINKS ON ONE OCCASION: NEVER
HOW OFTEN DO YOU ATTEND CHURCH OR RELIGIOUS SERVICES?: NEVER
HOW OFTEN DO YOU ATTENT MEETINGS OF THE CLUB OR ORGANIZATION YOU BELONG TO?: PATIENT DECLINED
IN A TYPICAL WEEK, HOW MANY TIMES DO YOU TALK ON THE PHONE WITH FAMILY, FRIENDS, OR NEIGHBORS?: ONCE A WEEK
WITHIN THE PAST 12 MONTHS, YOU WORRIED THAT YOUR FOOD WOULD RUN OUT BEFORE YOU GOT THE MONEY TO BUY MORE: NEVER TRUE
IN THE PAST 12 MONTHS, HAS THE ELECTRIC, GAS, OIL, OR WATER COMPANY THREATENED TO SHUT OFF SERVICE IN YOUR HOME?: NO
HOW MANY DRINKS CONTAINING ALCOHOL DO YOU HAVE ON A TYPICAL DAY WHEN YOU ARE DRINKING: PATIENT DOES NOT DRINK
HOW OFTEN DO YOU GET TOGETHER WITH FRIENDS OR RELATIVES?: PATIENT DECLINED
HOW OFTEN DO YOU GET TOGETHER WITH FRIENDS OR RELATIVES?: PATIENT DECLINED
HOW OFTEN DO YOU ATTEND CHURCH OR RELIGIOUS SERVICES?: NEVER
HOW HARD IS IT FOR YOU TO PAY FOR THE VERY BASICS LIKE FOOD, HOUSING, MEDICAL CARE, AND HEATING?: NOT HARD AT ALL
DO YOU BELONG TO ANY CLUBS OR ORGANIZATIONS SUCH AS CHURCH GROUPS UNIONS, FRATERNAL OR ATHLETIC GROUPS, OR SCHOOL GROUPS?: NO
IN A TYPICAL WEEK, HOW MANY TIMES DO YOU TALK ON THE PHONE WITH FAMILY, FRIENDS, OR NEIGHBORS?: ONCE A WEEK
DO YOU BELONG TO ANY CLUBS OR ORGANIZATIONS SUCH AS CHURCH GROUPS UNIONS, FRATERNAL OR ATHLETIC GROUPS, OR SCHOOL GROUPS?: NO
HOW OFTEN DO YOU ATTENT MEETINGS OF THE CLUB OR ORGANIZATION YOU BELONG TO?: PATIENT DECLINED

## 2024-09-24 ENCOUNTER — APPOINTMENT (OUTPATIENT)
Dept: MEDICAL GROUP | Facility: MEDICAL CENTER | Age: 69
End: 2024-09-24
Payer: MEDICARE

## 2024-10-17 ENCOUNTER — TELEPHONE (OUTPATIENT)
Dept: MEDICAL GROUP | Facility: MEDICAL CENTER | Age: 69
End: 2024-10-17

## 2024-10-17 ENCOUNTER — APPOINTMENT (OUTPATIENT)
Dept: MEDICAL GROUP | Facility: MEDICAL CENTER | Age: 69
End: 2024-10-17
Payer: MEDICARE

## 2024-10-17 VITALS
TEMPERATURE: 98.2 F | HEART RATE: 64 BPM | SYSTOLIC BLOOD PRESSURE: 116 MMHG | BODY MASS INDEX: 41.16 KG/M2 | DIASTOLIC BLOOD PRESSURE: 66 MMHG | WEIGHT: 294 LBS | RESPIRATION RATE: 18 BRPM | OXYGEN SATURATION: 91 % | HEIGHT: 71 IN

## 2024-10-17 DIAGNOSIS — Z00.00 PREVENTATIVE HEALTH CARE: Chronic | ICD-10-CM

## 2024-10-17 DIAGNOSIS — E78.5 DYSLIPIDEMIA: Chronic | ICD-10-CM

## 2024-10-17 DIAGNOSIS — K76.0 HEPATIC STEATOSIS: ICD-10-CM

## 2024-10-17 DIAGNOSIS — E66.813 CLASS 3 DRUG-INDUCED OBESITY WITH SERIOUS COMORBIDITY AND BODY MASS INDEX (BMI) OF 40.0 TO 44.9 IN ADULT (HCC): ICD-10-CM

## 2024-10-17 DIAGNOSIS — R73.09 IMPAIRED GLUCOSE METABOLISM: ICD-10-CM

## 2024-10-17 DIAGNOSIS — I10 PRIMARY HYPERTENSION: Chronic | ICD-10-CM

## 2024-10-17 DIAGNOSIS — E66.1 CLASS 3 DRUG-INDUCED OBESITY WITH SERIOUS COMORBIDITY AND BODY MASS INDEX (BMI) OF 40.0 TO 44.9 IN ADULT (HCC): ICD-10-CM

## 2024-10-17 DIAGNOSIS — Z12.11 COLON CANCER SCREENING: ICD-10-CM

## 2024-10-17 DIAGNOSIS — Z12.5 PROSTATE CANCER SCREENING: ICD-10-CM

## 2024-10-17 DIAGNOSIS — Z23 NEEDS FLU SHOT: ICD-10-CM

## 2024-10-17 DIAGNOSIS — E55.9 VITAMIN D DEFICIENCY: ICD-10-CM

## 2024-10-17 DIAGNOSIS — K64.9 HEMORRHOIDS, UNSPECIFIED HEMORRHOID TYPE: ICD-10-CM

## 2024-10-17 DIAGNOSIS — E66.01 MORBID OBESITY (HCC): Chronic | ICD-10-CM

## 2024-10-17 DIAGNOSIS — E11.9 DIABETES MELLITUS WITHOUT COMPLICATION (HCC): ICD-10-CM

## 2024-10-17 PROCEDURE — G0008 ADMIN INFLUENZA VIRUS VAC: HCPCS

## 2024-10-17 PROCEDURE — 3074F SYST BP LT 130 MM HG: CPT | Performed by: INTERNAL MEDICINE

## 2024-10-17 PROCEDURE — 99214 OFFICE O/P EST MOD 30 MIN: CPT | Mod: 25 | Performed by: INTERNAL MEDICINE

## 2024-10-17 PROCEDURE — 90662 IIV NO PRSV INCREASED AG IM: CPT

## 2024-10-17 PROCEDURE — 3078F DIAST BP <80 MM HG: CPT | Performed by: INTERNAL MEDICINE

## 2024-10-17 RX ORDER — SEMAGLUTIDE 1.34 MG/ML
1 INJECTION, SOLUTION SUBCUTANEOUS
Qty: 3 ML | Refills: 5 | Status: SHIPPED | OUTPATIENT
Start: 2024-10-17

## 2024-10-17 ASSESSMENT — FIBROSIS 4 INDEX: FIB4 SCORE: 1.342893700757788533

## 2024-10-17 ASSESSMENT — PATIENT HEALTH QUESTIONNAIRE - PHQ9: CLINICAL INTERPRETATION OF PHQ2 SCORE: 0

## 2024-10-17 NOTE — TELEPHONE ENCOUNTER
Need old records [facility] eye [facility]

## 2024-10-17 NOTE — LETTER
[employer] [facility]  Phil Kohler M.D.  [address]  [place] [address]  Fax: [phone]   Authorization for Release/Disclosure of   Protected Health Information   Name: DARCI BARRETT : 1955 SSN: [id number]   Address: [address] [place] [address]  [place] [address] Phone:    There are no phone numbers on file.   I authorize the entity listed below to release/disclose the PHI below to:   [facility]/Phil Kohler M.D. and Phil Kohler M.D.   Provider or Entity Name:  [facility]    Address   City, [facility], [facility]   Phone:      Fax:  [phone]   Reason for request: continuity of care   Information to be released: ALL INFO    [  ] LAST COLONOSCOPY,  including any PATH REPORT and follow-up  [  ] LAST FIT/COLOGUARD RESULT [  ] LAST DEXA  [  ] LAST MAMMOGRAM  [  ] LAST PAP  [  ] LAST LABS [  ] RETINA EXAM REPORT  [  ] IMMUNIZATION RECORDS  [ XXX ] Release all info      [  ] Check here and initial the line next to each item to release ALL health information INCLUDING  _____ Care and treatment for drug and / or alcohol abuse  _____ HIV testing, infection status, or AIDS  _____ Genetic Testing    DATES OF SERVICE OR TIME PERIOD TO BE DISCLOSED: _____________  I understand and acknowledge that:  * This Authorization may be revoked at any time by you in writing, except if your health information has already been used or disclosed.  * Your health information that will be used or disclosed as a result of you signing this authorization could be re-disclosed by the recipient. If this occurs, your re-disclosed health information may no longer be protected by State or Federal laws.  * You may refuse to sign this Authorization. Your refusal will not affect your ability to obtain treatment.  * This Authorization becomes effective upon signing and will  on (date) __________.      If no date is indicated, this Authorization will  one (1) year from the signature date.    Name: Darci Cisse  Ryan  Signature: CONTINUITY OF CARE  Date:   10/21/2024     PLEASE FAX REQUESTED RECORDS BACK TO: [phone]

## 2024-10-29 ENCOUNTER — HOSPITAL ENCOUNTER (OUTPATIENT)
Dept: LAB | Facility: MEDICAL CENTER | Age: 69
End: 2024-10-29
Attending: INTERNAL MEDICINE
Payer: MEDICARE

## 2024-10-29 DIAGNOSIS — E55.9 VITAMIN D DEFICIENCY: ICD-10-CM

## 2024-10-29 DIAGNOSIS — E78.5 DYSLIPIDEMIA: Chronic | ICD-10-CM

## 2024-10-29 DIAGNOSIS — Z12.5 PROSTATE CANCER SCREENING: ICD-10-CM

## 2024-10-29 DIAGNOSIS — K76.0 HEPATIC STEATOSIS: ICD-10-CM

## 2024-10-29 DIAGNOSIS — I10 PRIMARY HYPERTENSION: Chronic | ICD-10-CM

## 2024-10-29 DIAGNOSIS — R73.09 IMPAIRED GLUCOSE METABOLISM: ICD-10-CM

## 2024-10-29 LAB
25(OH)D3 SERPL-MCNC: 30 NG/ML (ref 30–100)
ALBUMIN SERPL BCP-MCNC: 3.9 G/DL (ref 3.2–4.9)
ALBUMIN/GLOB SERPL: 1.2 G/DL
ALP SERPL-CCNC: 80 U/L (ref 30–99)
ALT SERPL-CCNC: 9 U/L (ref 2–50)
ANION GAP SERPL CALC-SCNC: 13 MMOL/L (ref 7–16)
APPEARANCE UR: CLEAR
AST SERPL-CCNC: 11 U/L (ref 12–45)
BASOPHILS # BLD AUTO: 0.7 % (ref 0–1.8)
BASOPHILS # BLD: 0.09 K/UL (ref 0–0.12)
BILIRUB SERPL-MCNC: 0.6 MG/DL (ref 0.1–1.5)
BILIRUB UR QL STRIP.AUTO: NEGATIVE
BUN SERPL-MCNC: 15 MG/DL (ref 8–22)
CALCIUM ALBUM COR SERPL-MCNC: 10.1 MG/DL (ref 8.5–10.5)
CALCIUM SERPL-MCNC: 10 MG/DL (ref 8.5–10.5)
CHLORIDE SERPL-SCNC: 97 MMOL/L (ref 96–112)
CHOLEST SERPL-MCNC: 175 MG/DL (ref 100–199)
CO2 SERPL-SCNC: 25 MMOL/L (ref 20–33)
COLOR UR: YELLOW
CREAT SERPL-MCNC: 0.74 MG/DL (ref 0.5–1.4)
CREAT UR-MCNC: 136 MG/DL
EOSINOPHIL # BLD AUTO: 0.2 K/UL (ref 0–0.51)
EOSINOPHIL NFR BLD: 1.6 % (ref 0–6.9)
ERYTHROCYTE [DISTWIDTH] IN BLOOD BY AUTOMATED COUNT: 46.9 FL (ref 35.9–50)
EST. AVERAGE GLUCOSE BLD GHB EST-MCNC: 117 MG/DL
GFR SERPLBLD CREATININE-BSD FMLA CKD-EPI: 97 ML/MIN/1.73 M 2
GLOBULIN SER CALC-MCNC: 3.2 G/DL (ref 1.9–3.5)
GLUCOSE SERPL-MCNC: 98 MG/DL (ref 65–99)
GLUCOSE UR STRIP.AUTO-MCNC: NEGATIVE MG/DL
HBA1C MFR BLD: 5.7 % (ref 4–5.6)
HCT VFR BLD AUTO: 47.7 % (ref 42–52)
HDLC SERPL-MCNC: 54 MG/DL
HGB BLD-MCNC: 16.3 G/DL (ref 14–18)
IMM GRANULOCYTES # BLD AUTO: 0.06 K/UL (ref 0–0.11)
IMM GRANULOCYTES NFR BLD AUTO: 0.5 % (ref 0–0.9)
KETONES UR STRIP.AUTO-MCNC: NEGATIVE MG/DL
LDLC SERPL CALC-MCNC: 99 MG/DL
LEUKOCYTE ESTERASE UR QL STRIP.AUTO: NEGATIVE
LYMPHOCYTES # BLD AUTO: 2.53 K/UL (ref 1–4.8)
LYMPHOCYTES NFR BLD: 20.5 % (ref 22–41)
MCH RBC QN AUTO: 32.5 PG (ref 27–33)
MCHC RBC AUTO-ENTMCNC: 34.2 G/DL (ref 32.3–36.5)
MCV RBC AUTO: 95 FL (ref 81.4–97.8)
MICRO URNS: NORMAL
MICROALBUMIN UR-MCNC: <1.2 MG/DL
MICROALBUMIN/CREAT UR: NORMAL MG/G (ref 0–30)
MONOCYTES # BLD AUTO: 1.34 K/UL (ref 0–0.85)
MONOCYTES NFR BLD AUTO: 10.9 % (ref 0–13.4)
NEUTROPHILS # BLD AUTO: 8.12 K/UL (ref 1.82–7.42)
NEUTROPHILS NFR BLD: 65.8 % (ref 44–72)
NITRITE UR QL STRIP.AUTO: NEGATIVE
NRBC # BLD AUTO: 0 K/UL
NRBC BLD-RTO: 0 /100 WBC (ref 0–0.2)
PH UR STRIP.AUTO: 6.5 [PH] (ref 5–8)
PLATELET # BLD AUTO: 299 K/UL (ref 164–446)
PMV BLD AUTO: 10.3 FL (ref 9–12.9)
POTASSIUM SERPL-SCNC: 4.3 MMOL/L (ref 3.6–5.5)
PROT SERPL-MCNC: 7.1 G/DL (ref 6–8.2)
PROT UR QL STRIP: NEGATIVE MG/DL
PSA SERPL-MCNC: 4.5 NG/ML (ref 0–4)
RBC # BLD AUTO: 5.02 M/UL (ref 4.7–6.1)
RBC UR QL AUTO: NEGATIVE
SODIUM SERPL-SCNC: 135 MMOL/L (ref 135–145)
SP GR UR STRIP.AUTO: 1.02
TRIGL SERPL-MCNC: 108 MG/DL (ref 0–149)
TSH SERPL-ACNC: 2.01 UIU/ML (ref 0.35–5.5)
UROBILINOGEN UR STRIP.AUTO-MCNC: 1 EU/DL
WBC # BLD AUTO: 12.3 K/UL (ref 4.8–10.8)

## 2024-10-29 PROCEDURE — 80053 COMPREHEN METABOLIC PANEL: CPT

## 2024-10-29 PROCEDURE — 85025 COMPLETE CBC W/AUTO DIFF WBC: CPT

## 2024-10-29 PROCEDURE — 83036 HEMOGLOBIN GLYCOSYLATED A1C: CPT

## 2024-10-29 PROCEDURE — 83010 ASSAY OF HAPTOGLOBIN QUANT: CPT

## 2024-10-29 PROCEDURE — 82247 BILIRUBIN TOTAL: CPT

## 2024-10-29 PROCEDURE — 82043 UR ALBUMIN QUANTITATIVE: CPT

## 2024-10-29 PROCEDURE — 82306 VITAMIN D 25 HYDROXY: CPT

## 2024-10-29 PROCEDURE — 82570 ASSAY OF URINE CREATININE: CPT

## 2024-10-29 PROCEDURE — 80061 LIPID PANEL: CPT

## 2024-10-29 PROCEDURE — 81003 URINALYSIS AUTO W/O SCOPE: CPT

## 2024-10-29 PROCEDURE — 83883 ASSAY NEPHELOMETRY NOT SPEC: CPT

## 2024-10-29 PROCEDURE — 84478 ASSAY OF TRIGLYCERIDES: CPT

## 2024-10-29 PROCEDURE — 82977 ASSAY OF GGT: CPT

## 2024-10-29 PROCEDURE — 36415 COLL VENOUS BLD VENIPUNCTURE: CPT

## 2024-10-29 PROCEDURE — 84460 ALANINE AMINO (ALT) (SGPT): CPT

## 2024-10-29 PROCEDURE — 84443 ASSAY THYROID STIM HORMONE: CPT

## 2024-10-29 PROCEDURE — 82172 ASSAY OF APOLIPOPROTEIN: CPT

## 2024-10-29 PROCEDURE — 82947 ASSAY GLUCOSE BLOOD QUANT: CPT

## 2024-10-29 PROCEDURE — 84153 ASSAY OF PSA TOTAL: CPT

## 2024-10-29 PROCEDURE — 84450 TRANSFERASE (AST) (SGOT): CPT

## 2024-10-29 PROCEDURE — 82465 ASSAY BLD/SERUM CHOLESTEROL: CPT

## 2024-10-30 ENCOUNTER — TELEPHONE (OUTPATIENT)
Dept: MEDICAL GROUP | Facility: MEDICAL CENTER | Age: 69
End: 2024-10-30
Payer: MEDICARE

## 2024-10-30 PROBLEM — D72.829 LEUKOCYTOSIS: Status: ACTIVE | Noted: 2024-10-30

## 2024-10-30 PROBLEM — R97.20 ABNORMAL PSA: Status: ACTIVE | Noted: 2024-10-30

## 2024-10-31 LAB
A2 MACROGLOB SERPL-MCNC: 225 MG/DL (ref 110–276)
ALT SERPL W P-5'-P-CCNC: 9 IU/L (ref 0–55)
APO A-I SERPL-MCNC: 162 MG/DL (ref 101–178)
AST SERPL W P-5'-P-CCNC: 11 IU/L (ref 0–40)
BILIRUB SERPL-MCNC: 0.5 MG/DL (ref 0–1.2)
CHOLEST SERPL-MCNC: 187 MG/DL (ref 100–199)
FIBROSIS SCORING 550107: ABNORMAL
FIBROSIS STAGE SERPL QL: ABNORMAL
GGT SERPL-CCNC: 20 IU/L (ref 0–65)
GLUCOSE SERPL-MCNC: 101 MG/DL (ref 70–99)
HAPTOGLOB SERPL-MCNC: 299 MG/DL (ref 34–355)
LABORATORY COMMENT REPORT: ABNORMAL
LIVER FIBR SCORE SERPL CALC.FIBROSURE: 0.22 (ref 0–0.21)
LIVER STEATOSIS GRADE SERPL QL: ABNORMAL
LIVER STEATOSIS SCORE SERPL: 0.3 (ref 0–0.4)
NASH GRADE SERPL QL: ABNORMAL
NASH INTERPRETATION SERPL-IMP: ABNORMAL
NASH SCORE SERPL: 0 (ref 0–0.25)
NASH SCORING Z4789: ABNORMAL
STEATOSIS SCORING NL11718: ABNORMAL
TEST PERFORMANCE INFO SPEC: ABNORMAL
TEST PERFORMANCE INFO SPEC: ABNORMAL
TRIGL SERPL-MCNC: 126 MG/DL (ref 0–149)

## 2024-11-12 DIAGNOSIS — M25.561 RIGHT KNEE PAIN, UNSPECIFIED CHRONICITY: ICD-10-CM

## 2024-11-12 NOTE — PROGRESS NOTES
Received request via: Pharmacy    Was the patient seen in the last year in this department? Yes    Does the patient have an active prescription (recently filled or refills available) for medication(s) requested? No    [facility]     Does the patient have [facility]PC Plus and need 100-day supply? (This applies to ALL medications) Yes, quantity updated to 100 days

## 2024-11-13 RX ORDER — CELECOXIB 100 MG/1
100 CAPSULE ORAL
Qty: 100 CAPSULE | Refills: 1 | Status: SHIPPED | OUTPATIENT
Start: 2024-11-13 | End: 2025-01-30

## 2024-11-23 ENCOUNTER — TELEPHONE (OUTPATIENT)
Dept: MEDICAL GROUP | Facility: MEDICAL CENTER | Age: 69
End: 2024-11-23
Payer: MEDICARE

## 2024-11-23 DIAGNOSIS — M25.519 SHOULDER PAIN, UNSPECIFIED CHRONICITY, UNSPECIFIED LATERALITY: ICD-10-CM

## 2024-12-20 ENCOUNTER — TELEPHONE (OUTPATIENT)
Dept: HEALTH INFORMATION MANAGEMENT | Facility: OTHER | Age: 69
End: 2024-12-20
Payer: MEDICARE

## 2025-01-15 ENCOUNTER — TELEPHONE (OUTPATIENT)
Dept: MEDICAL GROUP | Facility: MEDICAL CENTER | Age: 70
End: 2025-01-15
Payer: MEDICARE

## 2025-01-15 ENCOUNTER — PATIENT MESSAGE (OUTPATIENT)
Dept: MEDICAL GROUP | Facility: MEDICAL CENTER | Age: 70
End: 2025-01-15
Payer: MEDICARE

## 2025-01-15 PROBLEM — E11.9 DIABETES MELLITUS, TYPE II (HCC): Status: ACTIVE | Noted: 2024-10-17

## 2025-01-15 RX ORDER — SEMAGLUTIDE 1.34 MG/ML
1 INJECTION, SOLUTION SUBCUTANEOUS
Qty: 3 ML | Refills: 5 | Status: SHIPPED | OUTPATIENT
Start: 2025-01-15 | End: 2025-01-30

## 2025-01-15 NOTE — TELEPHONE ENCOUNTER
Need PAR  (1) ozempic 4 mg/3 ml pen injector  (2) take 1 mg under skin every 7 days, 3 ml per 28 days  (3) diagnosis: Diabetes mellitus type 2  (4) ICD 10: E11.9  (5) comments: Intolerant metformin, has been on Ozempic since March 2022

## 2025-01-15 NOTE — TELEPHONE ENCOUNTER
Received request via: Patient    Was the patient seen in the last year in this department? Yes    Does the patient have an active prescription (recently filled or refills available) for medication(s) requested? No    Pharmacy Name: [id number] - [place], [place] - [address] AT [facility]     Does the patient have retirement Plus and need 100-day supply? (This applies to ALL medications) Yes, quantity updated to 100 days

## 2025-01-24 NOTE — TELEPHONE ENCOUNTER
FINAL PRIOR AUTHORIZATION STATUS:    1.  Name of Medication & Dose: Semaglutide, 1 MG/DOSE, (OZEMPIC, 1 MG/DOSE,) 4 MG/3ML Solution Pen-injector      2. Prior Auth Status: Approved through 1/22/26     3. Action Taken: Pharmacy Notified: yes Patient Notified: yes.

## 2025-01-30 ENCOUNTER — OFFICE VISIT (OUTPATIENT)
Dept: MEDICAL GROUP | Facility: MEDICAL CENTER | Age: 70
End: 2025-01-30
Payer: MEDICARE

## 2025-01-30 VITALS
HEART RATE: 68 BPM | HEIGHT: 71 IN | SYSTOLIC BLOOD PRESSURE: 104 MMHG | DIASTOLIC BLOOD PRESSURE: 72 MMHG | TEMPERATURE: 98 F | WEIGHT: 294 LBS | OXYGEN SATURATION: 96 % | BODY MASS INDEX: 41.16 KG/M2

## 2025-01-30 DIAGNOSIS — R97.20 ABNORMAL PSA: ICD-10-CM

## 2025-01-30 DIAGNOSIS — E66.01 MORBID OBESITY (HCC): Chronic | ICD-10-CM

## 2025-01-30 DIAGNOSIS — E11.8 CONTROLLED TYPE 2 DIABETES MELLITUS WITH COMPLICATION, WITHOUT LONG-TERM CURRENT USE OF INSULIN (HCC): ICD-10-CM

## 2025-01-30 DIAGNOSIS — M25.569 KNEE PAIN, UNSPECIFIED CHRONICITY, UNSPECIFIED LATERALITY: ICD-10-CM

## 2025-01-30 DIAGNOSIS — D72.829 LEUKOCYTOSIS, UNSPECIFIED TYPE: ICD-10-CM

## 2025-01-30 DIAGNOSIS — M25.519 SHOULDER PAIN, UNSPECIFIED CHRONICITY, UNSPECIFIED LATERALITY: ICD-10-CM

## 2025-01-30 DIAGNOSIS — K40.91 RECURRENT INGUINAL HERNIA WITHOUT OBSTRUCTION OR GANGRENE, UNSPECIFIED LATERALITY: ICD-10-CM

## 2025-01-30 DIAGNOSIS — I10 PRIMARY HYPERTENSION: Chronic | ICD-10-CM

## 2025-01-30 DIAGNOSIS — D72.819 LEUKOPENIA, UNSPECIFIED TYPE: ICD-10-CM

## 2025-01-30 DIAGNOSIS — E11.69 TYPE 2 DIABETES MELLITUS WITH OTHER SPECIFIED COMPLICATION, WITHOUT LONG-TERM CURRENT USE OF INSULIN (HCC): ICD-10-CM

## 2025-01-30 DIAGNOSIS — E66.01 SEVERE OBESITY (HCC): ICD-10-CM

## 2025-01-30 DIAGNOSIS — E78.5 DYSLIPIDEMIA: Chronic | ICD-10-CM

## 2025-01-30 RX ORDER — SEMAGLUTIDE 2.68 MG/ML
2 INJECTION, SOLUTION SUBCUTANEOUS
Qty: 3 ML | Refills: 6 | Status: SHIPPED | OUTPATIENT
Start: 2025-01-30

## 2025-01-30 RX ORDER — CELECOXIB 200 MG/1
200 CAPSULE ORAL
Qty: 100 CAPSULE | Refills: 1 | Status: SHIPPED | OUTPATIENT
Start: 2025-01-30

## 2025-01-30 RX ORDER — VALSARTAN 40 MG/1
40 TABLET ORAL DAILY
Qty: 100 TABLET | Refills: 1 | Status: SHIPPED | OUTPATIENT
Start: 2025-01-30 | End: 2026-03-06

## 2025-01-30 ASSESSMENT — FIBROSIS 4 INDEX: FIB4 SCORE: 0.87

## 2025-01-30 NOTE — PROGRESS NOTES
Subjective     Darci Davis is a 69 y.o. male who presents with follow-up blood pressure Follow-Up            HPI      Here follow-up blood pressure, blood pressure been running low since he has been losing weight with Ozempic, on Diovan 160/HCTZ 25 mg, blood pressures at home running 100-113 over 60s to 70, lower blood pressures can cause him to be lightheaded at times, so he will skip a dose of the blood pressure medication periodically.  Typically will do that every other day now.  Tries to eat a healthy diet limiting sweets, candies, processed foods.  Retired but keeps active around his house and taking care of his [place].  On Ozempic 1 mL gram weekly with no significant nausea, vomiting, abdominal pain, loose stools, constipation.  Perhaps a bit of nausea times in the morning with some passing of flatus but no abdominal pain.  Medication does help decrease his appetite and food cravings.  Waiting for prior authorization of the Ozempic 1 mg, has been off it for 2 weeks.  No alcohol.  No tobacco.  Chronic shoulder, back, knee pains, on Celebrex 100 mg increased to twice daily with no GI upset sometimes he will take 2 tablets which helps for shoulder pain.  No history of CKD, no heartburn or stomach upset with Celebrex.  Umbilical hernia no pain but has been more prominent or more noticeable since she has lost weight  Eye exam  ophthalmology for the past 1 year with no changes  Medications, allergies, medical history, surgical history, social history, family history  reviewed and updated        Current Outpatient Medications on File Prior to Visit   Medication Sig Dispense Refill    Semaglutide, 1 MG/DOSE, (OZEMPIC, 1 MG/DOSE,) 4 MG/3ML Solution Pen-injector Inject 1 mg under the skin every 7 days. 3 mL 5    celecoxib (CELEBREX) 100 MG Cap Take 1 Capsule by mouth 1 time a day as needed for Moderate Pain (arthritis pain). Indications: Arthritis 100 Capsule 1    atorvastatin (LIPITOR) 20 MG  Tab Take 1 Tablet by mouth every day. 100 Tablet 3    Blood Glucose Monitoring Suppl (FREESTYLE LITE) Device E11.9 1 Each 0     No current facility-administered medications on file prior to visit.         Status post knee left replacement  6/08  right meniscus repair  5/11 MRI left knee medial meniscus tear, moderate chondral thinning, chondromalacia  6/11  ortho left knee medial and lateral meniscectomy  7/12  left knee injection  12/12 now sees  ortho  1/13  ortho note, injection left knee  5/21/13  ortho note, injection left knee  3/31/15  ortho note; left knee injection steroid  5/29/15  ortho note knee arthritis,second injection euflexxa left knee  7/11/16  orthopedic operative note, left total knee arthroplasty  1/31/17  orthopedic note 6 months status post left total knee, x-ray left knee 3 view good component position and alignment, follow-up 6 months repeat x-rays  8/27/19  orthopedic note injury right knee, x-ray loss of medial joint space findings consistent with lateral meniscus tear, obtain MRI follow-up after imaging completed    shoulder right pain  8/5/24 xray right shoulder arthritis   11/23/24 referral physical therapy     Renal cyst  12/9/13 ultrasound abdomen left renal 18 mm hypoechoic mass most likely cysts, MRI or CT to clarify  12/13/13 CT abd/pelvis with and without; 8 mm simple appearing cyst in the lower pole the left kidney which is discordant in size with the recent ultrasound. Followup ultrasound surveillance or MRI examination may be of value to further study the left kidney,11 mm in diameter simple cyst in the midpole region of the right kidney  11/17/14 ultrasound renal kidneys are poorly visualized due to poor sonographic penetration of the current exam,no focal renal cyst or mass is identified in the left kidney as seen on prior imaging studies at this time     [facility] health  10/9/14  zoster vaccine  5/28/15 tdap  6/5/19 prevnar  7/2/20 pneumovax   3/7/22 declines colonoscopy, cologuard ordered  10/17/24 flu   10/29/24 psa 4.5  10/29/24 vit d 30    obesity  7/2/13 declines bariatric eval  7/12/13 apnea link AHI 2, desaturation <89% for 27 min, total sleep time 6 hours 34 min, negative   12/2/13  referral bariatric surgery  10/9/14 BMI 44.3; referral to  weight loss bariatric  5/28/15 BMI 46.3 has seen  and declines bariatric surgery, trial orlistat  12/29/15 BMI 46.3   2/4/16 patient called, insurance does not cover bariatric surgery  6/16/16 BMI 45.2  9/16/16 BMI 42.8   3/6/17 BMI 44.3 insurance not cover bariatric, declines nutrition consultation; 20-30 minutes bike per day  12/26/17 BMI 45.3  7/2/20 BMI 45.89 referral weight management   1/19/21 BMI 43.0  3/7/22 started ozempic   8/2/22 BMI 42.1 on semaglutide 0.5 mg weekly   1/16/23 has restarted ozempic 1 mg  5/29/24 BMI 42.12  [facility] start semaglutide 0.25 mg weekly  10/17/24 BMI 41 on semaglutide 0.5 mg, increase to 1 mg     neck pain  2/11/16 [place] note evaluation cervical pain, x-rays cervical spine from february 2016 showed lordosis C4-C6 and DJD, slight C4-C5 spondylolisthesis, will obtain cervical flexion and extension x-rays, continue naprosyn and robaxin as needed, start physical therapy, follow-up 6 weeks if no improvement consider MRI     macular degeneration   5/17/21 dr.shieh cortés retina note 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, follow-up 3 weeks  8/25/22  retina note neovascular amd   1/16/23  eye exam neovascular amd   5/9/23  retina note follow up 6 months  1/2/24  [facility] eye exam macular degeneration      low back pain  12/12 x-ray hip bilateral mild DJD  12/12 x-ray lumbar spine multilevel DJD and arthropathy, mild to moderate dextroconvex scoliosis    leukocytosis  9/23/08 wbc 12.0 (74%N,19.6%L)  5/15/09 wbc  9.1  3/11/11 wbc 10.9  12/10/12 wbc 9.1  10/10/14 wbc 10.8  1/4/16 wbc 10.3  6/28/16 wbc 13.1  7/12/16 wbc 15.1  4/6/17 wbc 9.6  12/27/17 wbc 10  6/7/19 wbc 11.6  7/16/20 wbc 9.3  3/16/22 wbc 10.9  6/2/23 wbc 7.1  5/25/24 wbc 9.5  10/29/24 wbc 12.3 (65.8%N,20.5%L)     knee pain right  8/27/19  orthopedic note injury right knee, x-ray loss of medial joint space findings consistent with lateral meniscus tear, obtain MRI follow-up after imaging completed  10/1/19  orthopedic evaluation right knee pain, MRI right knee shows medial and patellofemoral degenerative joint changes options discussed conservative treatment versus right arthroscopy, patient elects to proceed with arthroscopy right knee  11/13/24 on celebrex as needed pain       hypogonadism  10/10/14 testosterone 201,free testosterone 49  1/6/16 testosterone 150,free testosterone 28  2/12/16 testosterone 179,prolactin 7,FSH 2.5,LH 1.0,SHBG 37  2/16/16 will try androgel 20.25 mg apply two per day  6/16/16 off androgel     Hypertension  9/08 urine mac 9, intolerant ace cough  Was on diovan hct 320/12.5 caused lightheadedness, taking half pill a day  10/14/11 on diovan 320/12.5 mg  11/3/11 change to diovan 320 mg, combo dose caused fatigue  12/6/12 change to diovan 160/25 mg daily  12/12 urine mac 4 on diovan hct 160/25 mg  12/2/13 urine mac <0.5 on diovan hct 160/25 mg  10/10/14 urine mac 6 on diovan 160/25 mg  6/3/15 urine mac <0.5 on diovan 160/25 mg  1/6/16 urine mac <0.5 on diovan hct 160/25 mg  4/6/17 urine mac <0.7 on diovan hct 160/25 mg  5/16/18 ultrasound carotid negative   7/16/20 urine mac<1.2 on diovan hct 160/25 mg   2/18/21 urine mac<1.2  on diovan hct 160/25 mg   3/16/22 urine mac<1.2 on diovan hct 160/25 mg   6/3/23 urine mac<1.2 on diovan hct 160/25 mg  5/28/24 bun 20,creat 0.88,GFR 92 on diovan hct 160-25 mg per  [facility]  10/29/24 urine mac<1.2 on diovan hct 160/25 mg     Hepatic steatosis  hepatic steatosis    12/9/13 AST 19,ALT 15  12/9/13 ultrasound abdomen echogenic inhomogenous liver nonspecific finding often found to represent steatosis  10/10/14 AST 17,ALT 15  6/3/15 AST 17,ALT 18  1/4/16 AST 14,ALT 19  2/12/16 AST 27,ALT 30  6/21/16 AST 14,ALT 16  4/6/17 AST 16,ALT 14  10/29/24 AST 11,ALT 9  10/29/24 LITTLE fibrosure; fibrosis score 0.22 (F0-F1), steatosis score 0.3 (S0), LITTLE score 0.0 (N0),AST 11,ALT 9,GGT 20     Dyslipidemia  9/08 chol 213, trig 119, hdl 49, ldl 140  5/09 chol 171,trig 118,hdl 51,ldl 96  9/10 hold pravachol  3/11 chol 247,trig 143,hdl 51,ldl 167   3/11 start lipitor samples  5/11 need to try crestor per insurance, trial of crestor 10 mg  10/14/11 on lipitor 20 mg insurance cleared  10/24/11 chol 203,trig 102,hdl 52,ldl 131 off med; start lipitor 20 mg  12/12 chol 167,trig 114,hdl 53,ldl 91 on lipitor 20 mg  12/2/13 chol 199,trig 167,hdl 52,ldl 114 on lipitor 20 mg  10/10/14 chol 198,trig 140,hdl 55,ldl 115 on lipitor 20 mg  6/3/15 chol 162,trig 159,hdl 50,ldl 80 on lipitor 20 mg  12/29/15 off lipitor  1/6/16 chol 233,trig 147,hdl 52,ldl 142 off lipitor  1/7/16 start pravachol 20 mg  2/12/16 chol 190,trig 134,hdl 46,ldl 117 on pravachol 20 mg  6/21/16 chol 184,trig 131,hdl 54,ldl 104 on pravachol 20 mg  4/6/17 chol 190,trig 147,hdl 51,ldl 110 on pravachol 20 mg  12/27/17 chol 215,trig 201,hdl 53,ldl 122 on pravachol 20 mg  6/7/19 chol 200,trig 195,hdl 50,ldl 111 on pravachol 20 mg  7/16/20 chol 203,trig 115,hdl 55,ldl 125 on pravachol 20 mg  7/17/20 change to crestor 10 mg  1/19/21 back on pravachol 20 mg (intolerant to crestor)  2/18/21 chol 211,trig 175,hdl 47,ldl 129 on pravastatin 20 mg  2/23/21 change to lipitor 20 mg and stop pravastatin  3/16/22 chol 189,trig 193,hdl 49,ldl 101 on lipitor 20 mg  6/3/23 chol 246,trig 140,hdl 51,ldl 167 on lipitor 20 mg  5/25/24 chol 182,trig 118,hdl 51,ldl 107 on lipitor 20 mg  10/29/24 chol 175,trig 108,hdl 54,ldl 99 on lipitor 20 mg  Intolerant metformin  "    diabetes  10/10/14 bs 114  6/3/15 A1c 5.9%,bs 94  1/6/16 bs 104,A1c 6.1%  6/21/16 A1c 5.8%  4/6/17 bs 104  12/27/17 A1c 5.8%  6/7/19 A1c 6%  7/16/20 A1c 6.4%  7/17/20 start metformin  mg, order for freestyle lite meter and strips to pharmacy, patient will bring here for instruction  1/19/21 off metformin   2/18/21 A1c 6.4% no meds  5/17/21 dr.shieh cortés retina note 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, follow-up 3 weeks  3/16/22 A1c 6.1%  3/7/22 started ozempic 0.25 mg weekly  4/19/22 increase to ozempic 0.5 mg weekly  1/2/24  [facility] eye exam macular degeneration   5/28/24 A1c 6.1% on ozempic 0.5 mg per  [facility]   10/17/24 increase ozempic to 1 mg weekly  10/29/24 A1c 5.7% on ozempic 1 mg weekly  Intolerant metformin      BPH  5/28/15 start Flomax  12/29/15 start cialis 5 mg daily   4/6/17 psa 2.8  6/7/19 psa 2.1  7/2/20 could not tolerate flomax, trial doxazosin 2 mg  7/16/20 psa 2.3  3/7/22 off doxasozin   3/16/22 psa 2.9  6/3/23 psa 3.7  5/29/24  [facility] resume flomax 0.4 mg  10/29/24 psa 4.5 on flomax      abn psa  4/6/17 psa 2.85  6/7/19 psa 2.16  7/16/20 psa 239  3/16/22 psa 2.96  6/2/23 psa 3.73  10/29/24 psa 4.5                    Patient Active Problem List   Diagnosis    Hypertension    Morbid obesity (HCC)    S/p knee left replacement    Allergic rhinitis    Preventative health care    Dyslipidemia    Shoulder pain, right    Low back pain    Hepatic steatosis    Renal cyst    BPH (benign prostatic hypertrophy)    Hypogonadism male    Neck pain    Knee pain, right    Macular degeneration    History of tobacco use    Diabetes mellitus, type II (HCC)    Leukocytosis    Abnormal PSA           ROS           Objective     /72   Pulse 68   Temp 36.7 °C (98 °F) (Temporal)   Ht 1.803 m (5' 11\")   Wt (!) 133 kg (294 lb)   SpO2 96%   BMI 41.00 kg/m²      Physical Exam  Vitals and nursing note reviewed.   Constitutional:       " General: He is not in acute distress.  HENT:      Head: Normocephalic and atraumatic.      Right Ear: External ear normal.      Left Ear: External ear normal.      Nose: Nose normal.   Eyes:      Conjunctiva/sclera: Conjunctivae normal.   Cardiovascular:      Rate and Rhythm: Normal rate and regular rhythm.      Heart sounds: Normal heart sounds.   Pulmonary:      Effort: Pulmonary effort is normal. No respiratory distress.      Breath sounds: Normal breath sounds.   Abdominal:      General: There is no distension.   Musculoskeletal:         General: No swelling.      Cervical back: Neck supple.   Skin:     Coloration: Skin is not jaundiced.      Findings: No bruising.   Neurological:      General: No focal deficit present.      Mental Status: He is alert.   Psychiatric:         Mood and Affect: Mood normal.         Behavior: Behavior normal.                       Umbilical hernia reducible, nontender      Assessment & Plan   Assessment  #!  Hypertension, blood pressure running low systolic 100-110 range will occasionally get lightheaded and he will hold off on taking his blood pressure pill approximately every other day now with a weight loss from Ozempic, currently on Diovan /4.5    #2 diabetes mellitus with hypertension dyslipidemia on Ozempic 1 mg weekly last A1c in October 5.7% occasional flatus from the Ozempic but tolerated, has been able to lose weight as well medications beneficial with decreasing portion sizes and food cravings    #3 dyslipidemia on Lipitor no muscle aches or muscle pains on statin therapy    #4 hepatic steatosis    #5 chronic shoulder and knee pain, referral made to physical therapy near his residence but that was not covered by the insurance, Celebrex 100 mg once a day with no GI upset but he will need to take 2 a day that does not cause heartburn or stomach pain, no history of GI bleed, no history of CKD    #5 umbilical hernia asymptomatic    #6 obesity with comorbidity of  diabetes BMI 41.0 has been able to lose weight with the Ozempic without side effects    #7 leukocytosis    #8 abnormal PSA    #9 morbid obesity BMI 41 with concurrent diabetes    Plan    #1 discontinue Diovan /25 mg due to lower blood pressures with weight loss    #2 start Diovan 40 mg once a day    #3 increase Ozempic to 2 mg weekly monitor for nausea, increased gas, bloating, abdominal pain, loose stools, constipation    #4 send me update on his blood sugar readings and weight after a month on the Ozempic 2 mg dose sooner if side effects    #5 continue check blood pressure daily as blood pressure may increase on the different lower dose of his blood pressure medication regimen, ensure adequate hydration with water    #6 referral to physical therapy [facility]    #7 referral  surgery umbilical hernia    #8 old records  ophthalmology for the past 1 year    #9 follow-up 3 months     #10 Celebrex 200 mg prescription, currently taking 100 mg twice daily, monitor for GI upset, increases risk for GI bleed, renal insufficiency, continue no alcohol with the medication

## 2025-01-31 ENCOUNTER — TELEPHONE (OUTPATIENT)
Dept: MEDICAL GROUP | Facility: MEDICAL CENTER | Age: 70
End: 2025-01-31
Payer: MEDICARE

## 2025-01-31 NOTE — LETTER
[employer]  Phil Kohler M.D.  [address]  [place] [address]  Fax: [phone]   Authorization for Release/Disclosure of   Protected Health Information   Name: DARCI DAVIS : 1955 SSN: [id number]   Address: [address] [place] [address] [place] [address] Phone:    [phone] (home)    I authorize the entity listed below to release/disclose the PHI below to:   [facility]/Phil Kohler M.D. and Phil Kohler M.D.   Provider or Entity Name:  Dr. Bolanos   Address   City, State, Zip   Phone:      Fax:  [phone]    Reason for request: continuity of care   Information to be released:    [  ] LAST COLONOSCOPY,  including any PATH REPORT and follow-up  [  ] LAST FIT/COLOGUARD RESULT [  ] LAST DEXA  [  ] LAST MAMMOGRAM  [  ] LAST PAP  [  ] LAST LABS [  ] RETINA EXAM REPORT  [  ] IMMUNIZATION RECORDS  [ x ] Release past year      [  ] Check here and initial the line next to each item to release ALL health information INCLUDING  _____ Care and treatment for drug and / or alcohol abuse  _____ HIV testing, infection status, or AIDS  _____ Genetic Testing    DATES OF SERVICE OR TIME PERIOD TO BE DISCLOSED: _____________  I understand and acknowledge that:  * This Authorization may be revoked at any time by you in writing, except if your health information has already been used or disclosed.  * Your health information that will be used or disclosed as a result of you signing this authorization could be re-disclosed by the recipient. If this occurs, your re-disclosed health information may no longer be protected by State or Federal laws.  * You may refuse to sign this Authorization. Your refusal will not affect your ability to obtain treatment.  * This Authorization becomes effective upon signing and will  on (date) __________.      If no date is indicated, this Authorization will  one (1) year from the signature date.    Name: Darci Davis  Signature:continuity of care  Date:   1/31/2025     PLEASE FAX REQUESTED RECORDS BACK TO: [phone]

## 2025-01-31 NOTE — LETTER
[employer]  Phil Kohler M.D.  [address]  [place] [address]  Fax: [phone]   Authorization for Release/Disclosure of   Protected Health Information   Name: DARCI DAVIS : 1955 SSN: [id number]   Address: [address] [place] [address]  [place] [address] Phone:    [phone] (home)    I authorize the entity listed below to release/disclose the PHI below to:   [facility]/Phil Kohler M.D. and Phil Kohler M.D.   Provider or Entity Name:  Dr. Bolanos   Address   City, State, Zip   Phone:      Fax:  [phone]    Reason for request: continuity of care   Information to be released:    [  ] LAST COLONOSCOPY,  including any PATH REPORT and follow-up  [  ] LAST FIT/COLOGUARD RESULT [  ] LAST DEXA  [  ] LAST MAMMOGRAM  [  ] LAST PAP  [  ] LAST LABS [  ] RETINA EXAM REPORT  [  ] IMMUNIZATION RECORDS  [  ] Release all info      [  ] Check here and initial the line next to each item to release ALL health information INCLUDING  _____ Care and treatment for drug and / or alcohol abuse  _____ HIV testing, infection status, or AIDS  _____ Genetic Testing    DATES OF SERVICE OR TIME PERIOD TO BE DISCLOSED: _____________  I understand and acknowledge that:  * This Authorization may be revoked at any time by you in writing, except if your health information has already been used or disclosed.  * Your health information that will be used or disclosed as a result of you signing this authorization could be re-disclosed by the recipient. If this occurs, your re-disclosed health information may no longer be protected by State or Federal laws.  * You may refuse to sign this Authorization. Your refusal will not affect your ability to obtain treatment.  * This Authorization becomes effective upon signing and will  on (date) __________.      If no date is indicated, this Authorization will  one (1) year from the signature date.    Name: Darci Davis  Signature:continuity of care Date:    1/31/2025     PLEASE FAX REQUESTED RECORDS BACK TO: [phone]

## 2025-02-04 ENCOUNTER — APPOINTMENT (OUTPATIENT)
Dept: LAB | Facility: MEDICAL CENTER | Age: 70
End: 2025-02-04
Payer: MEDICARE

## 2025-02-08 ENCOUNTER — TELEPHONE (OUTPATIENT)
Dept: MEDICAL GROUP | Facility: MEDICAL CENTER | Age: 70
End: 2025-02-08

## 2025-02-08 NOTE — TELEPHONE ENCOUNTER
Need PAR  (1) ozempic 8 mg/ 3 ml take 2 mg auto injector under skin every 7 days  (2) 3 ml per 28 days  (3) diagnosis: diabetes type 2  (4) ICD 10: E11.9  (5) comments: tried metformin, has been on ozempic since 3/7/22

## 2025-02-09 ENCOUNTER — TELEPHONE (OUTPATIENT)
Dept: MEDICAL GROUP | Facility: MEDICAL CENTER | Age: 70
End: 2025-02-09
Payer: MEDICARE

## 2025-02-09 RX ORDER — SEMAGLUTIDE 1.34 MG/ML
1 INJECTION, SOLUTION SUBCUTANEOUS
Qty: 9 ML | Refills: 3 | Status: SHIPPED | OUTPATIENT
Start: 2025-02-09

## 2025-02-13 ENCOUNTER — OFFICE VISIT (OUTPATIENT)
Dept: URGENT CARE | Facility: PHYSICIAN GROUP | Age: 70
End: 2025-02-13
Payer: MEDICARE

## 2025-02-13 VITALS
HEART RATE: 102 BPM | HEIGHT: 71 IN | TEMPERATURE: 97.7 F | BODY MASS INDEX: 41.02 KG/M2 | DIASTOLIC BLOOD PRESSURE: 74 MMHG | WEIGHT: 293 LBS | RESPIRATION RATE: 16 BRPM | OXYGEN SATURATION: 95 % | SYSTOLIC BLOOD PRESSURE: 126 MMHG

## 2025-02-13 DIAGNOSIS — U07.1 COVID-19: ICD-10-CM

## 2025-02-13 PROCEDURE — 99214 OFFICE O/P EST MOD 30 MIN: CPT | Performed by: STUDENT IN AN ORGANIZED HEALTH CARE EDUCATION/TRAINING PROGRAM

## 2025-02-13 PROCEDURE — 3074F SYST BP LT 130 MM HG: CPT | Performed by: STUDENT IN AN ORGANIZED HEALTH CARE EDUCATION/TRAINING PROGRAM

## 2025-02-13 PROCEDURE — 3078F DIAST BP <80 MM HG: CPT | Performed by: STUDENT IN AN ORGANIZED HEALTH CARE EDUCATION/TRAINING PROGRAM

## 2025-02-13 ASSESSMENT — FIBROSIS 4 INDEX: FIB4 SCORE: 0.87

## 2025-02-13 NOTE — PROGRESS NOTES
"Subjective:   Darci Davis is a 69 y.o. male who presents for Coronavirus Screening (X2days, positive at home test, sore throat, runny nose, congestion, )      HPI:  69-year-old male presents to urgent care for 2 days of upper respiratory symptoms including sore throat, nasal congestion, runny nose, fatigue, and cough.  Patient's wife also positive for COVID-19.  Patient took an at home COVID test which was positive.  No shortness of breath or chest pain.  Tolerating fluids.    Medications:    atorvastatin Tabs  celecoxib Caps  Nirmatrelvir&Ritonavir 300/100 Tbpk  Ozempic (1 MG/DOSE) Sopn  valsartan Tabs    Allergies: Ace inhibitors, Latex, Pcn [penicillins], and Zocor [simvastatin]    Problem List: Darci Davis does not have any pertinent problems on file.    Surgical History:  Past Surgical History:   Procedure Laterality Date    KNEE ARTHROPLASTY TOTAL Left 7/11/2016    Procedure: KNEE ARTHROPLASTY TOTAL;  Surgeon: Madhav Iqbal M.D.;  Location: SURGERY [facility];  Service:     KNEE ARTHROSCOPY Left 6/2010    left knee    KNEE ARTHROSCOPY Right 6/2008    right knee       Past Social Hx: Darci Davis  reports that he quit smoking about 29 years ago. His smoking use included cigarettes. He started smoking about 44 years ago. He has a 15 pack-year smoking history. He has never used smokeless tobacco. He reports that he does not drink alcohol and does not use drugs.     Past Family Hx:  Darci Davis family history includes Cancer in his brother.     Problem list, medications, and allergies reviewed by myself today in Epic.     Objective:     /74 (BP Location: Left arm, Patient Position: Sitting, BP Cuff Size: Large adult)   Pulse (!) 102   Temp 36.5 °C (97.7 °F) (Temporal)   Resp 16   Ht 1.803 m (5' 11\")   Wt (!) 133 kg (293 lb)   SpO2 95%   BMI 40.87 kg/m²     Physical Exam  Vitals reviewed.   Constitutional:       Appearance: He is not toxic-appearing.   HENT: "      Right Ear: Tympanic membrane, ear canal and external ear normal.      Left Ear: Tympanic membrane, ear canal and external ear normal.      Nose: Rhinorrhea present. No congestion.      Mouth/Throat:      Mouth: Mucous membranes are moist.      Pharynx: Posterior oropharyngeal erythema present.   Cardiovascular:      Rate and Rhythm: Normal rate and regular rhythm.      Pulses: Normal pulses.      Heart sounds: Normal heart sounds. No murmur heard.  Pulmonary:      Effort: Pulmonary effort is normal. No respiratory distress.      Breath sounds: Normal breath sounds. No stridor. No wheezing, rhonchi or rales.         Assessment/Plan:     Diagnosis and associated orders:     1. COVID-19  Nirmatrelvir&Ritonavir 300/100 20 x 150 MG & 10 x 100MG Tablet Therapy Pack         Comments/MDM:     Positive COVID-19 antigen test.  Patient's presentation physical exam findings are consistent with this diagnosis.  Patient's wife also tested positive.  Discussed Paxlovid with the patient and he is a candidate for this.  He has had this medication in the past and tolerated well.  Paxlovid prescribed.  Advised to temporarily discontinue his statin.  May restart his statin 24 hours after finishing the full course of Paxlovid pulmonary exam shows no wheezing, rales, rhonchi.  No signs of pneumonia.  Patient is well-appearing and nontoxic.  May continue home supportive care as needed.  Return precautions given.         Differential diagnosis, natural history, supportive care, and indications for immediate follow-up discussed.    Advised the patient to follow-up with the primary care physician for recheck, reevaluation, and consideration of further management.    Please note that this dictation was created using voice recognition software. I have made a reasonable attempt to correct obvious errors, but I expect that there are errors of grammar and possibly content that I did not discover before finalizing the note.    Electronically  signed by Efren Watt PA-C.

## 2025-02-19 NOTE — TELEPHONE ENCOUNTER
This coverage request has been reviewed and denied [facility] on 1/16/2025 1:06 PM. Faxed [facility] to Appeals at [phone]

## 2025-03-07 ENCOUNTER — APPOINTMENT (OUTPATIENT)
Dept: SPORTS MEDICINE | Facility: OTHER | Age: 70
End: 2025-03-07
Payer: MEDICARE

## 2025-03-11 PROBLEM — K42.9 UMBILICAL HERNIA: Status: ACTIVE | Noted: 2025-03-11

## 2025-03-21 ENCOUNTER — HOSPITAL ENCOUNTER (OUTPATIENT)
Dept: LAB | Facility: MEDICAL CENTER | Age: 70
End: 2025-03-21
Attending: INTERNAL MEDICINE
Payer: MEDICARE

## 2025-03-21 DIAGNOSIS — E11.69 TYPE 2 DIABETES MELLITUS WITH OTHER SPECIFIED COMPLICATION, WITHOUT LONG-TERM CURRENT USE OF INSULIN (HCC): ICD-10-CM

## 2025-03-21 DIAGNOSIS — E78.5 DYSLIPIDEMIA: Chronic | ICD-10-CM

## 2025-03-21 DIAGNOSIS — D72.829 LEUKOCYTOSIS, UNSPECIFIED TYPE: ICD-10-CM

## 2025-03-21 DIAGNOSIS — R97.20 ABNORMAL PSA: ICD-10-CM

## 2025-03-21 LAB
ALBUMIN SERPL BCP-MCNC: 4.1 G/DL (ref 3.2–4.9)
ALBUMIN/GLOB SERPL: 1.2 G/DL
ALP SERPL-CCNC: 78 U/L (ref 30–99)
ALT SERPL-CCNC: 11 U/L (ref 2–50)
ANION GAP SERPL CALC-SCNC: 13 MMOL/L (ref 7–16)
APPEARANCE UR: ABNORMAL
AST SERPL-CCNC: 16 U/L (ref 12–45)
BACTERIA #/AREA URNS HPF: ABNORMAL /HPF
BASOPHILS # BLD AUTO: 0.7 % (ref 0–1.8)
BASOPHILS # BLD: 0.09 K/UL (ref 0–0.12)
BILIRUB SERPL-MCNC: 0.4 MG/DL (ref 0.1–1.5)
BILIRUB UR QL STRIP.AUTO: NEGATIVE
BUN SERPL-MCNC: 15 MG/DL (ref 8–22)
CA OXALATE CRYSTAL  1765: PRESENT /HPF
CALCIUM ALBUM COR SERPL-MCNC: 9.8 MG/DL (ref 8.5–10.5)
CALCIUM SERPL-MCNC: 9.9 MG/DL (ref 8.5–10.5)
CASTS URNS QL MICRO: ABNORMAL /LPF (ref 0–2)
CHLORIDE SERPL-SCNC: 103 MMOL/L (ref 96–112)
CHOLEST SERPL-MCNC: 170 MG/DL (ref 100–199)
CO2 SERPL-SCNC: 24 MMOL/L (ref 20–33)
COLOR UR: YELLOW
CREAT SERPL-MCNC: 0.93 MG/DL (ref 0.5–1.4)
CREAT UR-MCNC: 153 MG/DL
EOSINOPHIL # BLD AUTO: 0.21 K/UL (ref 0–0.51)
EOSINOPHIL NFR BLD: 1.7 % (ref 0–6.9)
EPITHELIAL CELLS 1715: ABNORMAL /HPF (ref 0–5)
ERYTHROCYTE [DISTWIDTH] IN BLOOD BY AUTOMATED COUNT: 48.4 FL (ref 35.9–50)
EST. AVERAGE GLUCOSE BLD GHB EST-MCNC: 120 MG/DL
GFR SERPLBLD CREATININE-BSD FMLA CKD-EPI: 87 ML/MIN/1.73 M 2
GLOBULIN SER CALC-MCNC: 3.4 G/DL (ref 1.9–3.5)
GLUCOSE SERPL-MCNC: 114 MG/DL (ref 65–99)
GLUCOSE UR STRIP.AUTO-MCNC: NEGATIVE MG/DL
HBA1C MFR BLD: 5.8 % (ref 4–5.6)
HCT VFR BLD AUTO: 50.6 % (ref 42–52)
HDLC SERPL-MCNC: 46 MG/DL
HGB BLD-MCNC: 16.7 G/DL (ref 14–18)
IMM GRANULOCYTES # BLD AUTO: 0.09 K/UL (ref 0–0.11)
IMM GRANULOCYTES NFR BLD AUTO: 0.7 % (ref 0–0.9)
KETONES UR STRIP.AUTO-MCNC: ABNORMAL MG/DL
LDLC SERPL CALC-MCNC: 88 MG/DL
LEUKOCYTE ESTERASE UR QL STRIP.AUTO: NEGATIVE
LYMPHOCYTES # BLD AUTO: 2.37 K/UL (ref 1–4.8)
LYMPHOCYTES NFR BLD: 19.3 % (ref 22–41)
MCH RBC QN AUTO: 31.7 PG (ref 27–33)
MCHC RBC AUTO-ENTMCNC: 33 G/DL (ref 32.3–36.5)
MCV RBC AUTO: 96 FL (ref 81.4–97.8)
MICRO URNS: ABNORMAL
MICROALBUMIN UR-MCNC: 1.4 MG/DL
MICROALBUMIN/CREAT UR: 9 MG/G (ref 0–30)
MONOCYTES # BLD AUTO: 1.03 K/UL (ref 0–0.85)
MONOCYTES NFR BLD AUTO: 8.4 % (ref 0–13.4)
NEUTROPHILS # BLD AUTO: 8.48 K/UL (ref 1.82–7.42)
NEUTROPHILS NFR BLD: 69.2 % (ref 44–72)
NITRITE UR QL STRIP.AUTO: NEGATIVE
NRBC # BLD AUTO: 0 K/UL
NRBC BLD-RTO: 0 /100 WBC (ref 0–0.2)
PH UR STRIP.AUTO: 7 [PH] (ref 5–8)
PLATELET # BLD AUTO: 279 K/UL (ref 164–446)
PMV BLD AUTO: 11 FL (ref 9–12.9)
POTASSIUM SERPL-SCNC: 4.8 MMOL/L (ref 3.6–5.5)
PROT SERPL-MCNC: 7.5 G/DL (ref 6–8.2)
PROT UR QL STRIP: NEGATIVE MG/DL
PSA SERPL DL<=0.01 NG/ML-MCNC: 4.02 NG/ML (ref 0–4)
RBC # BLD AUTO: 5.27 M/UL (ref 4.7–6.1)
RBC # URNS HPF: ABNORMAL /HPF (ref 0–2)
RBC UR QL AUTO: NEGATIVE
SODIUM SERPL-SCNC: 140 MMOL/L (ref 135–145)
SP GR UR STRIP.AUTO: 1.02
TRIGL SERPL-MCNC: 178 MG/DL (ref 0–149)
TSH SERPL-ACNC: 2.18 UIU/ML (ref 0.35–5.5)
UROBILINOGEN UR STRIP.AUTO-MCNC: 1 EU/DL
WBC # BLD AUTO: 12.3 K/UL (ref 4.8–10.8)
WBC #/AREA URNS HPF: ABNORMAL /HPF

## 2025-03-21 PROCEDURE — 88184 FLOWCYTOMETRY/ TC 1 MARKER: CPT

## 2025-03-21 PROCEDURE — 85025 COMPLETE CBC W/AUTO DIFF WBC: CPT

## 2025-03-21 PROCEDURE — 83036 HEMOGLOBIN GLYCOSYLATED A1C: CPT

## 2025-03-21 PROCEDURE — 82043 UR ALBUMIN QUANTITATIVE: CPT

## 2025-03-21 PROCEDURE — 80061 LIPID PANEL: CPT

## 2025-03-21 PROCEDURE — 80053 COMPREHEN METABOLIC PANEL: CPT

## 2025-03-21 PROCEDURE — 36415 COLL VENOUS BLD VENIPUNCTURE: CPT

## 2025-03-21 PROCEDURE — 83695 ASSAY OF LIPOPROTEIN(A): CPT

## 2025-03-21 PROCEDURE — 82570 ASSAY OF URINE CREATININE: CPT

## 2025-03-21 PROCEDURE — 88185 FLOWCYTOMETRY/TC ADD-ON: CPT | Mod: 91

## 2025-03-21 PROCEDURE — 84153 ASSAY OF PSA TOTAL: CPT

## 2025-03-21 PROCEDURE — 81001 URINALYSIS AUTO W/SCOPE: CPT

## 2025-03-21 PROCEDURE — 84443 ASSAY THYROID STIM HORMONE: CPT

## 2025-03-22 PROBLEM — R31.29 MICROSCOPIC HEMATURIA: Status: ACTIVE | Noted: 2025-03-22

## 2025-03-23 ENCOUNTER — RESULTS FOLLOW-UP (OUTPATIENT)
Dept: MEDICAL GROUP | Facility: MEDICAL CENTER | Age: 70
End: 2025-03-23

## 2025-03-23 LAB
ACUTE LEUKEMIA MARKERS SPEC-IMP: NORMAL
EVENTS COUNTED SPEC: 26 MARKERS
SOURCE 9121: NORMAL

## 2025-03-24 LAB — LPA SERPL-MCNC: 118 MG/DL

## 2025-03-31 ENCOUNTER — OFFICE VISIT (OUTPATIENT)
Dept: MEDICAL GROUP | Facility: MEDICAL CENTER | Age: 70
End: 2025-03-31
Payer: MEDICARE

## 2025-03-31 VITALS
BODY MASS INDEX: 41.58 KG/M2 | WEIGHT: 297 LBS | OXYGEN SATURATION: 9 % | HEART RATE: 85 BPM | TEMPERATURE: 98.1 F | HEIGHT: 71 IN | DIASTOLIC BLOOD PRESSURE: 78 MMHG | SYSTOLIC BLOOD PRESSURE: 130 MMHG

## 2025-03-31 DIAGNOSIS — E66.01 MORBID OBESITY (HCC): Chronic | ICD-10-CM

## 2025-03-31 DIAGNOSIS — R35.0 BENIGN PROSTATIC HYPERPLASIA WITH URINARY FREQUENCY: ICD-10-CM

## 2025-03-31 DIAGNOSIS — R31.29 MICROSCOPIC HEMATURIA: ICD-10-CM

## 2025-03-31 DIAGNOSIS — N40.1 BENIGN PROSTATIC HYPERPLASIA WITH URINARY FREQUENCY: ICD-10-CM

## 2025-03-31 DIAGNOSIS — I10 PRIMARY HYPERTENSION: ICD-10-CM

## 2025-03-31 DIAGNOSIS — G45.3 AMAUROSIS FUGAX: ICD-10-CM

## 2025-03-31 DIAGNOSIS — Z91.89 AT RISK FOR SLEEP APNEA: ICD-10-CM

## 2025-03-31 DIAGNOSIS — E78.5 DYSLIPIDEMIA: Chronic | ICD-10-CM

## 2025-03-31 PROCEDURE — 3078F DIAST BP <80 MM HG: CPT | Performed by: INTERNAL MEDICINE

## 2025-03-31 PROCEDURE — 99214 OFFICE O/P EST MOD 30 MIN: CPT | Performed by: INTERNAL MEDICINE

## 2025-03-31 PROCEDURE — 3075F SYST BP GE 130 - 139MM HG: CPT | Performed by: INTERNAL MEDICINE

## 2025-03-31 ASSESSMENT — FIBROSIS 4 INDEX: FIB4 SCORE: 1.23

## 2025-03-31 ASSESSMENT — PATIENT HEALTH QUESTIONNAIRE - PHQ9: CLINICAL INTERPRETATION OF PHQ2 SCORE: 1

## 2025-03-31 NOTE — PROGRESS NOTES
Subjective     Darci Davis is a 69 y.o. male who presents with Follow-Up (3 months fv )            HPI    Per  seen 3/6/25 patient states in january was working outside all day and then experienced left eye looking through a tunnel black cloud like lookind down a tunnel like a cloud in the periphery left side blurry but no loss of vision lasted for a second and gone   No headache with that  No diff with speech or swalllwing       Had covid 2 months     3/10/25 dr.pratt arreaga surgical note symptomatic primary umbilical hernia, recommended robotic assisted laparoscopic repair with peritoneal placement of mesh, BMI 41, recommend BMI 38 or less or 25 pound weight loss to reduce risk of recurrence and operative complication, will allow 3 months to reach ideal body weight  Blood pressures running 125-135/70 on valsartan   3/21/25 UA 6-10 RBC  3/21/25 chol 170,trig 178,hdl 46,ldl 88,lipoprotein a 118 on lipitor 20 mg    Has been off ozempic x 2 weeks as had some nausea     Current Outpatient Medications   Medication Sig Dispense Refill    Semaglutide, 1 MG/DOSE, (OZEMPIC, 1 MG/DOSE,) 4 MG/3ML Solution Pen-injector Inject 1 mg under the skin every 7 days. 9 mL 3    valsartan (DIOVAN) 40 MG Tab Take 1 Tablet by mouth every day. 100 Tablet 1    celecoxib (CELEBREX) 200 MG Cap Take 1 Capsule by mouth 1 time a day as needed for Moderate Pain (shoulder pain). 100 Capsule 1    atorvastatin (LIPITOR) 20 MG Tab Take 1 Tablet by mouth every day. 100 Tablet 3     No current facility-administered medications for this visit.         umbilical hernia  3/10/25 dr.pratt arreaga surgical note symptomatic primary umbilical hernia, recommended robotic assisted laparoscopic repair with peritoneal placement of mesh, BMI 41, recommend BMI 38 or less or 25 pound weight loss to reduce risk of recurrence and operative complication, will allow 3 months to reach ideal body weight     Status post knee left replacement  6/08   right meniscus repair  5/11 MRI left knee medial meniscus tear, moderate chondral thinning, chondromalacia  6/11  ortho left knee medial and lateral meniscectomy  7/12  left knee injection  12/12 now sees dr.mendez ortho  1/13  ortho note, injection left knee  5/21/13  ortho note, injection left knee  3/31/15  ortho note; left knee injection steroid  5/29/15  ortho note knee arthritis,second injection euflexxa left knee  7/11/16  orthopedic operative note, left total knee arthroplasty  1/31/17  orthopedic note 6 months status post left total knee, x-ray left knee 3 view good component position and alignment, follow-up 6 months repeat x-rays  8/27/19  orthopedic note injury right knee, x-ray loss of medial joint space findings consistent with lateral meniscus tear, obtain MRI follow-up after imaging completed     shoulder right pain  8/5/24 xray right shoulder arthritis   11/23/24 referral physical therapy  1/30/25 referral to [facility], increase celebrex from 100 mg qday to 200 mg qday     Renal cyst  12/9/13 ultrasound abdomen left renal 18 mm hypoechoic mass most likely cysts, MRI or CT to clarify  12/13/13 CT abd/pelvis with and without; 8 mm simple appearing cyst in the lower pole the left kidney which is discordant in size with the recent ultrasound. Followup ultrasound surveillance or MRI examination may be of value to further study the left kidney,11 mm in diameter simple cyst in the midpole region of the right kidney  11/17/14 ultrasound renal kidneys are poorly visualized due to poor sonographic penetration of the current exam,no focal renal cyst or mass is identified in the left kidney as seen on prior imaging studies at this time     Preventative health  10/9/14 zoster vaccine  5/28/15 tdap  6/5/19 prevnar  7/2/20 pneumovax   3/7/22 declines colonoscopy, cologuard ordered  10/17/24 flu   10/29/24 vit d 30  3/21/25 psa  4.0     obesity  7/2/13 declines bariatric eval  7/12/13 apnea link AHI 2, desaturation <89% for 27 min, total sleep time 6 hours 34 min, negative   12/2/13  referral bariatric surgery  10/9/14 BMI 44.3; referral to  weight loss bariatric  5/28/15 BMI 46.3 has seen  and declines bariatric surgery, trial orlistat  12/29/15 BMI 46.3   2/4/16 patient called, insurance does not cover bariatric surgery  6/16/16 BMI 45.2  9/16/16 BMI 42.8   3/6/17 BMI 44.3 insurance not cover bariatric, declines nutrition consultation; 20-30 minutes bike per day  12/26/17 BMI 45.3  7/2/20 BMI 45.89 referral weight management   1/19/21 BMI 43.0  3/7/22 started ozempic   8/2/22 BMI 42.1 on semaglutide 0.5 mg weekly   1/16/23 has restarted ozempic 1 mg  5/29/24 BMI 42.12  [facility] start semaglutide 0.25 mg weekly  10/17/24 BMI 41 on semaglutide 0.5 mg, increase to 1 mg  1/30/25 BMI 41 on ozempic 1 mg,increase to 2 mg     neck pain  2/11/16 [place] note evaluation cervical pain, x-rays cervical spine from february 2016 showed lordosis C4-C6 and DJD, slight C4-C5 spondylolisthesis, will obtain cervical flexion and extension x-rays, continue naprosyn and robaxin as needed, start physical therapy, follow-up 6 weeks if no improvement consider MRI    microscopic hematuria  6/28/16 UA 2-5 RBC  7/5/16 UA negative blood  4/6/17 UA negative blood  6/7/19 UA negative blood  7/16/20 UA negative blood  3/16/22 UA negative blood  6/2/23 UA negative blood  10/29/24 UA negative blood  3/21/25 UA 6-10 RBC     macular degeneration   5/17/21 dr.shieh cortés retina note 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, follow-up 3 weeks  8/25/22  retina note neovascular amd   1/16/23 dr.ok eye exam neovascular amd   5/9/23  retina note follow up 6 months  1/2/24 dr[facility] exam macular degeneration      low back pain  12/12 x-ray hip bilateral mild DJD  12/12 x-ray lumbar  spine multilevel DJD and arthropathy, mild to moderate dextroconvex scoliosis     leukocytosis  9/23/08 wbc 12.0 (74%N,19.6%L)  5/15/09 wbc 9.1  3/11/11 wbc 10.9  12/10/12 wbc 9.1  10/10/14 wbc 10.8  1/4/16 wbc 10.3  6/28/16 wbc 13.1  7/12/16 wbc 15.1  4/6/17 wbc 9.6  12/27/17 wbc 10  6/7/19 wbc 11.6  7/16/20 wbc 9.3  3/16/22 wbc 10.9  6/2/23 wbc 7.1  5/25/24 wbc 9.5  10/29/24 wbc 12.3 (65.8%N,20.5%L)  3/21/25 wbc 12.3 (69%N,19%L), flow cytometry no abnormal myeloid, B-cell, T-cell, or NK population cells     knee pain right  8/27/19  orthopedic note injury right knee, x-ray loss of medial joint space findings consistent with lateral meniscus tear, obtain MRI follow-up after imaging completed  10/1/19  orthopedic evaluation right knee pain, MRI right knee shows medial and patellofemoral degenerative joint changes options discussed conservative treatment versus right arthroscopy, patient elects to proceed with arthroscopy right knee  11/13/24 on celebrex as needed pain       hypogonadism  10/10/14 testosterone 201,free testosterone 49  1/6/16 testosterone 150,free testosterone 28  2/12/16 testosterone 179,prolactin 7,FSH 2.5,LH 1.0,SHBG 37  2/16/16 will try androgel 20.25 mg apply two per day  6/16/16 off androgel     Hypertension  9/08 urine mac 9, intolerant ace cough  Was on diovan hct 320/12.5 caused lightheadedness, taking half pill a day  10/14/11 on diovan 320/12.5 mg  11/3/11 change to diovan 320 mg, combo dose caused fatigue  12/6/12 change to diovan 160/25 mg daily  12/12 urine mac 4 on diovan hct 160/25 mg  12/2/13 urine mac <0.5 on diovan hct 160/25 mg  10/10/14 urine mac 6 on diovan 160/25 mg  6/3/15 urine mac <0.5 on diovan 160/25 mg  1/6/16 urine mac <0.5 on diovan hct 160/25 mg  4/6/17 urine mac <0.7 on diovan hct 160/25 mg  5/16/18 ultrasound carotid negative   7/16/20 urine mac<1.2 on diovan hct 160/25 mg   2/18/21 urine mac<1.2  on diovan hct 160/25 mg   3/16/22 urine mac<1.2 on  diovan hct 160/25 mg   6/3/23 urine mac<1.2 on diovan hct 160/25 mg  5/28/24 bun 20,creat 0.88,GFR 92 on diovan hct 160-25 mg per  [facility]  10/29/24 urine mac<1.2 on diovan hct 160/25 mg  1/30/25 change diovan hct 160/25 mg to diovan 40 mg lower blood pressures with weight loss  3/21/25 urine mac 9 on diovan 40 mg     Hepatic steatosis  12/9/13 AST 19,ALT 15  12/9/13 ultrasound abdomen echogenic inhomogenous liver nonspecific finding often found to represent steatosis  10/10/14 AST 17,ALT 15  6/3/15 AST 17,ALT 18  1/4/16 AST 14,ALT 19  2/12/16 AST 27,ALT 30  6/21/16 AST 14,ALT 16  4/6/17 AST 16,ALT 14  10/29/24 AST 11,ALT 9  10/29/24 LITTLE fibrosure; fibrosis score 0.22 (F0-F1), steatosis score 0.3 (S0), LITTLE score 0.0 (N0),AST 11,ALT 9,GGT 20  3/21/25 AST 16,ALT 11     Dyslipidemia  9/08 chol 213, trig 119, hdl 49, ldl 140  5/09 chol 171,trig 118,hdl 51,ldl 96  9/10 hold pravachol  3/11 chol 247,trig 143,hdl 51,ldl 167   3/11 start lipitor samples  5/11 need to try crestor per insurance, trial of crestor 10 mg  10/14/11 on lipitor 20 mg insurance cleared  10/24/11 chol 203,trig 102,hdl 52,ldl 131 off med; start lipitor 20 mg  12/12 chol 167,trig 114,hdl 53,ldl 91 on lipitor 20 mg  12/2/13 chol 199,trig 167,hdl 52,ldl 114 on lipitor 20 mg  10/10/14 chol 198,trig 140,hdl 55,ldl 115 on lipitor 20 mg  6/3/15 chol 162,trig 159,hdl 50,ldl 80 on lipitor 20 mg  12/29/15 off lipitor  1/6/16 chol 233,trig 147,hdl 52,ldl 142 off lipitor  1/7/16 start pravachol 20 mg  2/12/16 chol 190,trig 134,hdl 46,ldl 117 on pravachol 20 mg  6/21/16 chol 184,trig 131,hdl 54,ldl 104 on pravachol 20 mg  4/6/17 chol 190,trig 147,hdl 51,ldl 110 on pravachol 20 mg  12/27/17 chol 215,trig 201,hdl 53,ldl 122 on pravachol 20 mg  6/7/19 chol 200,trig 195,hdl 50,ldl 111 on pravachol 20 mg  7/16/20 chol 203,trig 115,hdl 55,ldl 125 on pravachol 20 mg  7/17/20 change to crestor 10 mg  1/19/21 back on pravachol 20 mg (intolerant to  crestor)  2/18/21 chol 211,trig 175,hdl 47,ldl 129 on pravastatin 20 mg  2/23/21 change to lipitor 20 mg and stop pravastatin  3/16/22 chol 189,trig 193,hdl 49,ldl 101 on lipitor 20 mg  6/3/23 chol 246,trig 140,hdl 51,ldl 167 on lipitor 20 mg  5/25/24 chol 182,trig 118,hdl 51,ldl 107 on lipitor 20 mg  10/29/24 chol 175,trig 108,hdl 54,ldl 99 on lipitor 20 mg  3/21/25 chol 170,trig 178,hdl 46,ldl 88,lipoprotein a 118 on lipitor 20 mg  Intolerant metformin      diabetes  10/10/14 bs 114  6/3/15 A1c 5.9%,bs 94  1/6/16 bs 104,A1c 6.1%  6/21/16 A1c 5.8%  4/6/17 bs 104  12/27/17 A1c 5.8%  6/7/19 A1c 6%  7/16/20 A1c 6.4%  7/17/20 start metformin  mg, order for freestyle lite meter and strips to pharmacy, patient will bring here for instruction  1/19/21 off metformin   2/18/21 A1c 6.4% no meds  5/17/21   retina note 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, follow-up 3 weeks  3/16/22 A1c 6.1%  3/7/22 started ozempic 0.25 mg weekly  4/19/22 increase to ozempic 0.5 mg weekly  1/2/24  [facility] eye exam macular degeneration   5/28/24 A1c 6.1% on ozempic 0.5 mg per  [facility]   10/17/24 increase ozempic to 1 mg weekly  10/29/24 A1c 5.7% on ozempic 1 mg weekly  1/30/25 increase ozempic to 2 mg   2/9/25 patient prefers to stay on the ozempic 1 mg dose  3/21/25 A1c 5.8% on ozempic 1 mg  Intolerant metformin      BPH  5/28/15 start Flomax  12/29/15 start cialis 5 mg daily   4/6/17 psa 2.8  6/7/19 psa 2.1  7/2/20 could not tolerate flomax, trial doxazosin 2 mg  7/16/20 psa 2.3  3/7/22 off doxasozin   3/16/22 psa 2.9  6/3/23 psa 3.7  5/29/24  [facility] resume flomax 0.4 mg  10/29/24 psa 4.5 on flomax   3/21/25 psa 4.0 on flomax     abn psa  4/6/17 psa 2.85  6/7/19 psa 2.16  7/16/20 psa 239  3/16/22 psa 2.96  6/2/23 psa 3.73  10/29/24 psa 4.5  3/21/25 psa 4.0         Patient Active Problem List   Diagnosis    Hypertension    Morbid obesity (HCC)    S/p knee left  "replacement    Allergic rhinitis    Preventative health care    Dyslipidemia    Shoulder pain, right    Low back pain    Hepatic steatosis    Renal cyst    BPH (benign prostatic hypertrophy)    Hypogonadism male    Neck pain    Knee pain, right    Macular degeneration    History of tobacco use    Diabetes mellitus, type II (HCC)    Leukocytosis    Abnormal PSA    Umbilical hernia    Microscopic hematuria       Depression Screening  Little interest or pleasure in doing things?     Feeling down, depressed , or hopeless? 1 - several days  Trouble falling or staying asleep, or sleeping too much?     Feeling tired or having little energy?     Poor appetite or overeating?     Feeling bad about yourself - or that you are a failure or have let yourself or your family down?    Trouble concentrating on things, such as reading the newspaper or watching television?    Moving or speaking so slowly that other people could have noticed.  Or the opposite - being so fidgety or restless that you have been moving around a lot more than usual?     Thoughts that you would be better off dead, or of hurting yourself?     Patient Health Questionnaire Score:                 Patient Care Team:  Phil Kohler M.D. as PCP - General (Internal Medicine)  Grecia Mena as Patient Advocate (Pharmacy)      ROS           Objective     /78   Pulse 85   Temp 36.7 °C (98.1 °F) (Temporal)   Ht 1.803 m (5' 11\")   Wt (!) 135 kg (297 lb)   SpO2 (!) 9%   BMI 41.42 kg/m²      Physical Exam                             Assessment & Plan  Microscopic hematuria    Orders:    URINALYSIS,CULTURE IF INDICATED    Dyslipidemia    Orders:    Lipid Profile; Future    APOLIPOPROTEIN B; Future     Assessment       3/10/25  [place] surgical note symptomatic primary umbilical hernia, recommended robotic assisted laparoscopic repair with peritoneal placement of mesh, BMI 41, recommend BMI 38 or less or 25 pound weight loss to reduce risk of " recurrence and operative complication, will allow 3 months to reach ideal body weight  3/21/25 UA 6-10 RBC  3/21/25 chol 170,trig 178,hdl 46,ldl 88,lipoprotein a 118 on lipitor 20 mg        Next lab apo b  Need repeat UA  Declines slee apnea testing   Vaccines?     Sleep apnea referral stop bang   snore  tired   stop breathing  htn 1  bmi 41-2  age >50 3  male 4  neck size      BMI 41.42   old records   Esr and crp echo   Cta     Flomax one per day     Ekg   Increase lipitor to 80 mg and repeat labs 4 weeks and ua   Resume 1 mg ozempic monior of rnausea   deficit.      Motor: No weakness.      Coordination: Coordination normal.      Gait: Gait normal.   Psychiatric:         Mood and Affect: Mood normal.         Behavior: Behavior normal.       Funduscopic exam difficult to view since it was not dilated no obvious hemorrhages or exudates   Cranial nerves grossly intact, visual fields to confrontation normal, no nystagmus, extraocular movements intact, normal finger-to-nose, negative Romberg, normal strength upper and lower extremities, normal affect, insight, judgment                        Assessment & Plan     Assessment   #1 question amaurosis fugax left eye saw his ophthalmologist at that time but do not have those records no history of carotid stenosis, no history of arrhythmia, no history of migraine headache, no history of seizures, no history of arteriovenous thromboembolic or embolic event, no history of temporal arteritis or polymyalgia rheumatica, perhaps being out in the sun all day, dehydration may have contributed to situational symptoms    #2 diabetes mellitus A1c 5.8% Ozempic 1 mg but stopped 2 weeks ago due to nausea, no abdominal pain, no emesis, question side effect from the Ozempic no history of pancreatitis    #3 hypertension, blood pressure stable low-dose valsartan 40 mg daily    #4 dyslipidemia 3/21/25 chol 170,trig 178,hdl 46,ldl 88,lipoprotein a 118 on lipitor 20 mg    #5 umbilical hernia seen by surgery    3/10/25  [place] surgical note symptomatic primary umbilical hernia, recommended robotic assisted laparoscopic repair with peritoneal placement of mesh, BMI 41, recommend BMI 38 or less or 25 pound weight loss to reduce risk of recurrence and operative complication, will allow 3 months to reach ideal body weight    #6 microscopic hematuria  3/21/25 UA 6-10 RBC    #7 risk for sleep apnea; stop bang risk, no snoring, no stopping breathing, occasional fatigue, does have hypertension, BMI greater than 35, age greater than 50, male,  unknown exercise; at least stop bang risk 4     #8 urinary urgency, however we did not have time to discuss this since he was only a 20-minute appointment and he had multiple other issues to address including reviewing his labs, recent UA no evidence of infection    #9 chronic leukocytosis, WBC 12.3 normal differential, flow cytometry negative, has had leukocytosis off-and-on throughout the years going back to 2008 and it appears stable with no anemia, no thrombocytopenia, no history of recurrent infections     Plan  #1 EKG sinus    #2 echo with bubble study    #3 CT angiogram neck evaluate carotid stenosis    #4 repeat urinalysis follow-up microscopic hematuria with no evidence of UTI on standard UA, patient does have some urinary urgency question BPH, he did mention his symptoms at the end of our appointment so I did not have time to do further evaluation of his urinary symptoms    #5  Lipid panel improved, LDL 88 prior to starting statin therapy , so his LDL has decreased over 40 percent from his baseline with the atorvastatin 20 mg, however his lipoprotein a is elevated at 118 placing him at higher risk for cardiovascular event, given the lipoprotein a elevation, would aim for greater reduction of LDL, aiming for an LDL of 70, recommend increasing to Lipitor 80 mg daily take 4 tablets daily of his current 20 mg dose which will run out quickly, new prescription sent to his pharmacy atorvastatin 80 mg, monitor for muscle aches, muscle pains on higher atorvastatin dose, will need to check lipid panel, liver enzymes 4 weeks, lab slip provided will repeat lipid, liver enzymes in 4 weeks apolipoprotein B, UA    #6 continue work on nutrition, exercise, weight loss    #7 try to resume Ozempic 1 mg weekly and see if nausea recurs    #8 old records ophthalmology     #9 start Flomax can cause lightheadedness, dizziness, check blood pressure daily,has been prescribed this previously    #10 recommend sleep  apnea testing as he does have risk factors for sleep apnea, STOP-BANG score 4 he declines understanding sleep apnea increases risk of heart and lung disease, he does not want to do testing since he would not want any treatment for this     #11 notify us if any recurrent symptoms immediately     #12 follow up 3 months

## 2025-04-01 ENCOUNTER — TELEPHONE (OUTPATIENT)
Dept: MEDICAL GROUP | Facility: MEDICAL CENTER | Age: 70
End: 2025-04-01
Payer: MEDICARE

## 2025-04-01 VITALS
TEMPERATURE: 98.1 F | SYSTOLIC BLOOD PRESSURE: 130 MMHG | WEIGHT: 297 LBS | OXYGEN SATURATION: 94 % | HEIGHT: 71 IN | DIASTOLIC BLOOD PRESSURE: 78 MMHG | BODY MASS INDEX: 41.58 KG/M2 | HEART RATE: 85 BPM

## 2025-04-01 PROBLEM — Z91.89 AT RISK FOR SLEEP APNEA: Status: ACTIVE | Noted: 2025-04-01

## 2025-04-01 RX ORDER — ATORVASTATIN CALCIUM 80 MG/1
80 TABLET, FILM COATED ORAL NIGHTLY
Qty: 100 TABLET | Refills: 0 | Status: SHIPPED | OUTPATIENT
Start: 2025-04-01 | End: 2026-05-06

## 2025-04-01 NOTE — Clinical Note
[facility]  [address] [place] [address]    [id number]    Darci Davis  [address]  [place] [address]    April 1, 2025    Member Name: Darci Davis   Member Number: [id number]   Reference Number: [id number]   Approved Services: Echos and EKG   Approved Service Dates: 04/01/2025 - 07/30/2025   Requesting Provider: Phil Kohler   Requested Provider: [facility]     Dear Darci Cisse Ryan:    The following medical service(s) requested by Phil Kohler have been approved:    Procedure Code Procedure Code Name Requested Quantity Approved Quantity Status   86480 (CPT®) NM ECHO HEART XTHORACIC,COMPLETE W DOPPLER 1 1 Authorized       Approved Quantity means the number of visits approved for medication treatments and/or medical services.    The services should be provided by [facility] no later than 07/30/2025. Please contact the provider listed below with any questions.     Provider Information:  [facility]  [phone]    Your plan benefit may require a deductible, co-payment or coinsurance for these services. This authorization does not guarantee [facility] will pay the claim for services that you receive. Payment by [facility] for these services is subject to the terms of your Evidence of Coverage, your eligibility at the time of service, and confirmation of benefit coverage.    For any questions or additional information, please contact Customer Service:    [facility] Toll Free: [phone]  [employer] users dial: [phone]   Call Center Hours:  Oct 1 - Mar 31, Mon - Fri 7 AM to 8 PM PST  Oct 1 - Mar 31, Sat - Sun 8 AM to 8 PM PST  Apr 1 - Sep 30, Mon - Fri 7 AM to 8 PM PST   Office Hours: Mon - Fri 8 AM to 5 PM PST   E-mail: [email or web address]   Website:  [email or web address]      This information is available for free in other languages. Please contact Customer  Service at the phone number above for more information. [facility] complies with applicable Federal civil rights laws and does not discriminate on the basis of race, color, national origin, age, disability or sex.    Sincerely,     Healthcare Utilization Management Department     Cc: [facility]   Phil MARILEE Kohler    Multi-Language Insert  Multi- Services  English: We have free  services to answer any questions you may have about our health or drug plan.  To get an , just call us at [phone].  Someone who speaks English/Language can help you.  This is a free service.  South African: Tenemos servicios de intérprete sin costo alguno  para responder cualquier pregunta que pueda tener sobre nuestro plan de roberto carlos o medicamentos. Para hablar con un intérprete, por favor llame al [phone]. Alguien que hable español le podrá ayudar. Ani es un servicio gratuito.  Chinese Mandarin: ?????????????????????????????? ???????????????? [phone]????????????????? ?????????  Chinese Cantonese: ?????????????????????????????? ????????????? [phone]???????????????????? ????????  Tagalog:  Mayroon kaming libreng serbisyo sa ceballossasalorlando de lunangdorene curiel o panggamot.  grupo Fernandes  [phone]. Maaari nelson Prabhakar.  Jef ingram.  Amharic:  Nous proposons carlos services gratuits d'interprétation pour répondre à toutes kate questions relatives à notre régime de santé ou d'assurance-médicaments. Pour accéder au service d'interprétation, il vous suffit de nous appeler au [phone]. Un interlocuteur parCumberland Memorial Hospitalparamjit Françjessica pourra vous aider. Ce service est gratuit.  Slovak:  Miguel lyoni có d?ch v? thông d?ch mi?n phí ð? tr? l?i các câu h?i v? chýõng s?c kh?e và chýõng trìn thu?c men. N?u quí  v? c?n thông d?ch viên sanchez g?i [phone] s? có nhân viên nói ti?ng Vi?t giúp ð? quí v?. Ðây là d?ch v? mi?n phí .  Georgian:  Unser kostenser Dolmetscherservice beantwortet Ihren Fragen zu unserem Gesundheits- und Arzneimittelplan. Unsere Dolmetscher erreichen Sie [phone]. Man wird Ihnen raoul auf [facility]. Dieser Service ist krisMountainStar Healthcare.  Ukrainian:  ??? ?? ?? ?? ?? ??? ?? ??? ?? ???? ?? ?? ???? ???? ????. ?? ???? ????? ?? [phone] ??? ??? ????.  ???? ?? ???? ?? ?? ????. ? ???? ??? ?????.   Sri Lankan: Åñëè ó âàñ âîçíèêíóò âîïðîñû îòíîñèòåëüíî ñòðàõîâîãî èëè ìåäèêàìåíòíîãî ïëàíà, âû ìîæåòå âîñïîëüçîâàòüñÿ íàøèìè áåñïëàòíûìè óñëóãàìè ïåðåâîä÷èêîâ. ×òîáû âîñïîëüçîâàòüñÿ óñëóãàìè ïåðåâîä÷èêà, ïîçâîíèòå íàì ïî òåëåôîíó [phone]. Âàì îêàæåò ïîìîùü ñîòðóäíèê, êîòîðûé ãîâîðèò ïî-póññêè. Äàííàÿ óñëóãà áåñïëàòíàÿ.  Wolof: ÅääÇ äÞÏã ÎÏãÇÊ ÇáãÊÑÌã ÇáÝæÑí ÇáãÌÇäíÉ ááÅÌÇÈÉ Úä Ãí ÃÓÆáÉ ÊÊÚáÞ ÈÇáÕÍÉ Ãæ ÌÏæá ÇáÃÏæíÉ áÏíäÇ. ááÍÕæá Úáì ãÊÑÌã ÝæÑí¡ áíÓ Úáíß Óæì ÇáÇÊÕÇá ÈäÇ Úáì [phone] . ÓíÞæã ÔÎÕ ãÇ íÊÍÏË ÇáÚÑÈíÉ ÈãÓÇÚÏÊß. åÐå ÎÏãÉ ãÌÇäíÉ.  Luana: ????? ????????? ?? ??? ?? ????? ?? ???? ??? ???? ???? ?? ?????? ?? ???? ???? ?? ??? ????? ??? ????? ???????? ?????? ?????? ???. ?? ???????? ??????? ???? ?? ???, ?? ???? [phone] ?? ??? ????. ??? ??????? ?? ?????? ????? ?? ???? ??? ?? ???? ??. ?? ?? ????? ???? ??.   Croatian:  È disponibile un servizio di interpretariato gratuito per rispondere a eventuali domande sul nostro piano sanitario e farmaceutico. Per un interprete, contattare il zackary [phone]. Un nostro incaricato ximena parla Italianovi fornirà l'assistenza necessaria. È un servizio gratuito.  Portugués:  Dispomos de serviços de interpretação gratuitos para responder a qualquer questão que tenha acerca do nosso plano de saúde ou de medicação. Para obter um intérprete, contacte-nos através do número [phone]. Irá encontrar alguém que fale o idioma  Português para o ajudar. Ani  serviço é gratuito.  Sinhala Creole:  Nou genyen sèvis entèprèt gratis hayden reponn tout kesyon ou ta genyen konsènan plan medikal oswa dwòg nou an.  Hayden jwenn yon entèprèt, jis rele nou nan [phone]. Yon moun ki pale Kreyòl kapab elsi w.  Sa a se yon sèvis ki gratis.  Polish:  Umo¿liwiamy bezp³atne skorzystanie z us³ug t³umacza ustnego, który pomo¿e w uzyskaniu odpowiedzi na temat planu zdrowotnego lub dawjose carlos bullock. Niecy skorzystaæ z pomocy t³umacza znaj¹cego alysa izquierdo¿y zadzwoniæ pod numer [phone]. Ta us³uga jest bezp³atna.  Pit[place]air[place] Islander: ????? ??????? ????????????????????? ??????????????????????????????????[phone] ???????????????? ? ????????????????? ?????

## 2025-04-01 NOTE — TELEPHONE ENCOUNTER
Need old records  (1)  [facility] phone [phone], for the past 12 months

## 2025-04-01 NOTE — LETTER
[facility]  Pihl Kohler M.D.  [address]  [address]  [place] [address]  Fax: [phone]   Authorization for Release/Disclosure of   Protected Health Information   Name: BENJAMIN BARRETT : 1955 SSN: [id number]   Address: [address] [place] [address] [place] [address] Phone:    There are no phone numbers on file.   I authorize the entity listed below to release/disclose the PHI below to:   [facility]/Phil Kohler M.D. and Phil Kohler M.D.   Provider or Entity Name:  [facility]     Address    [facility]  [address] [place] [address]   Phone: [phone]      Fax: [phone]     Reason for request: continuity of care   Information to be released:    [  ] LAST COLONOSCOPY,  including any PATH REPORT and follow-up  [  ] LAST FIT/COLOGUARD RESULT [  ] LAST DEXA  [  ] LAST MAMMOGRAM  [  ] LAST PAP  [  ] LAST LABS [  ] RETINA EXAM REPORT  [  ] IMMUNIZATION RECORDS  [ x ] Release past 12 months      [  ] Check here and initial the line next to each item to release ALL health information INCLUDING  _____ Care and treatment for drug and / or alcohol abuse  _____ HIV testing, infection status, or AIDS  _____ Genetic Testing    DATES OF SERVICE OR TIME PERIOD TO BE DISCLOSED: _____________  I understand and acknowledge that:  * This Authorization may be revoked at any time by you in writing, except if your health information has already been used or disclosed.  * Your health information that will be used or disclosed as a result of you signing this authorization could be re-disclosed by the recipient. If this occurs, your re-disclosed health information may no longer be protected by State or Federal laws.  * You may refuse to sign this Authorization. Your refusal will not affect your ability to obtain treatment.  * This Authorization becomes effective upon signing and will  on (date) __________.      If no date is indicated, this Authorization will  one (1)  year from the signature date.    Name: Darci Davis  Signature: Date:   4/1/2025     PLEASE FAX REQUESTED RECORDS BACK TO: [phone]

## 2025-04-01 NOTE — TELEPHONE ENCOUNTER
Received request via: Pharmacy    Was the patient seen in the last year in this department? Yes    Does the patient have an active prescription (recently filled or refills available) for medication(s) requested? No    [facility]     Does the patient have MCC Plus and need 100-day supply? (This applies to ALL medications) Yes, quantity updated to 100 days

## 2025-04-02 RX ORDER — VALSARTAN 40 MG/1
40 TABLET ORAL DAILY
Qty: 100 TABLET | Refills: 1 | Status: SHIPPED | OUTPATIENT
Start: 2025-04-02 | End: 2026-05-07

## 2025-04-02 RX ORDER — TAMSULOSIN HYDROCHLORIDE 0.4 MG/1
0.4 CAPSULE ORAL DAILY
Qty: 30 CAPSULE | Refills: 3 | Status: SHIPPED | OUTPATIENT
Start: 2025-04-02

## 2025-04-02 RX ORDER — SEMAGLUTIDE 1.34 MG/ML
1 INJECTION, SOLUTION SUBCUTANEOUS
Qty: 9 ML | Refills: 3 | Status: SHIPPED | OUTPATIENT
Start: 2025-04-02

## 2025-04-04 ENCOUNTER — RESULTS FOLLOW-UP (OUTPATIENT)
Dept: MEDICAL GROUP | Facility: MEDICAL CENTER | Age: 70
End: 2025-04-04

## 2025-04-09 ENCOUNTER — RESULTS FOLLOW-UP (OUTPATIENT)
Dept: MEDICAL GROUP | Facility: MEDICAL CENTER | Age: 70
End: 2025-04-09

## 2025-04-09 ENCOUNTER — HOSPITAL ENCOUNTER (OUTPATIENT)
Dept: CARDIOLOGY | Facility: MEDICAL CENTER | Age: 70
End: 2025-04-09
Attending: INTERNAL MEDICINE
Payer: MEDICARE

## 2025-04-09 DIAGNOSIS — G45.3 AMAUROSIS FUGAX: ICD-10-CM

## 2025-04-09 LAB
LV EJECT FRACT  99904: 63
LV EJECT FRACT MOD 2C 99903: 65.51
LV EJECT FRACT MOD 4C 99902: 59.03
LV EJECT FRACT MOD BP 99901: 63.36

## 2025-04-09 PROCEDURE — 93306 TTE W/DOPPLER COMPLETE: CPT | Mod: 26 | Performed by: INTERNAL MEDICINE

## 2025-04-09 PROCEDURE — 93306 TTE W/DOPPLER COMPLETE: CPT

## 2025-04-10 NOTE — TELEPHONE ENCOUNTER
Notified patient echocardiogram normal LV function bubble study negative, he has not been contacted yet about the CT CTA neck with and without postprocessing, advised him to call imaging to schedule that.

## 2025-04-15 NOTE — Clinical Note
[facility]  [address] [place] [address]    [id number]    Darci Davis  [address]  [place] [address]    April 15, 2025    Member Name: Darci Davis   Member Number: [id number]   Reference Number: [id number]   Approved Services: MRI/CAT Scan   Approved Service Dates: 04/01/2025 - 08/13/2025   Requesting Provider: Phil Kohler   Requested Provider: [facility]     Dear Darci Cisse Ryan:    The following medical service(s) requested by Phil Kohler have been approved:    Procedure Code Procedure Code Name Requested Quantity Approved Quantity Status   64102 (CPT®) AK CT ANGIO,NECK COMBO,INCL IMAGE PROCESS 1 1 Authorized       Approved Quantity means the number of visits approved for medication treatments and/or medical services.    The services should be provided by [facility] no later than 08/13/2025. Please contact the provider listed below with any questions.     Provider Information:  [facility]  [phone]    Your plan benefit may require a deductible, co-payment or coinsurance for these services. This authorization does not guarantee [facility] will pay the claim for services that you receive. Payment by [facility] for these services is subject to the terms of your Evidence of Coverage, your eligibility at the time of service, and confirmation of benefit coverage.    For any questions or additional information, please contact Customer Service:    [facility] Toll Free: [phone]  [employer] users dial: [phone]   Call Center Hours:  Oct 1 - Mar 31, Mon - Fri 7 AM to 8 PM PST  Oct 1 - Mar 31, Sat - Sun 8 AM to 8 PM PST  Apr 1 - Sep 30, Mon - Fri 7 AM to 8 PM PST   Office Hours: Mon - Fri 8 AM to 5 PM PST   E-mail: [email or web address]   Website:  [email or web address]      This information is available for free in other languages. Please contact Customer  Service at the phone number above for more information. [facility] complies with applicable Federal civil rights laws and does not discriminate on the basis of race, color, national origin, age, disability or sex.    Sincerely,     Healthcare Utilization Management Department     Cc: [facility]   Phil MARILEE Kohler    Multi-Language Insert  Multi- Services  English: We have free  services to answer any questions you may have about our health or drug plan.  To get an , just call us at [phone].  Someone who speaks English/Language can help you.  This is a free service.  Tajik: Tenemos servicios de intérprete sin costo alguno  para responder cualquier pregunta que pueda tener sobre nuestro plan de roberto carlos o medicamentos. Para hablar con un intérprete, por favor llame al [phone]. Alguien que hable español le podrá ayudar. Ani es un servicio gratuito.  Chinese Mandarin: ?????????????????????????????? ???????????????? [phone]????????????????? ?????????  Chinese Cantonese: ?????????????????????????????? ????????????? [phone]???????????????????? ????????  Tagalog:  Mayroon kaming libreng serbisyo sa ceballossasalorlando de lunangdorene curiel o panggamot.  grupo Fernandes  [phone]. Maaari nelson Prabhakar.  Jef ingram.  Tajik:  Nous proposons carlos services gratuits d'interprétation pour répondre à toutes kate questions relatives à notre régime de santé ou d'assurance-médicaments. Pour accéder au service d'interprétation, il vous suffit de nous appeler au [phone]. Un interlocuteur parStoughton Hospitalparamjit Françjessica pourra vous aider. Ce service est gratuit.  Mongolian:  Miguel lyoni có d?ch v? thông d?ch mi?n phí ð? tr? l?i các câu h?i v? chýõng s?c kh?e và chýõng trìn thu?c men. N?u quí  v? c?n thông d?ch viên sanchez g?i [phone] s? có nhân viên nói ti?ng Vi?t giúp ð? quí v?. Ðây là d?ch v? mi?n phí .  Albanian:  Unser kostenser Dolmetscherservice beantwortet Ihren Fragen zu unserem Gesundheits- und Arzneimittelplan. Unsere Dolmetscher erreichen Sie [phone]. Man wird Ihnen raoul auf [facility]. Dieser Service ist krisKane County Human Resource SSD.  French:  ??? ?? ?? ?? ?? ??? ?? ??? ?? ???? ?? ?? ???? ???? ????. ?? ???? ????? ?? [phone] ??? ??? ????.  ???? ?? ???? ?? ?? ????. ? ???? ??? ?????.   Malawian: Åñëè ó âàñ âîçíèêíóò âîïðîñû îòíîñèòåëüíî ñòðàõîâîãî èëè ìåäèêàìåíòíîãî ïëàíà, âû ìîæåòå âîñïîëüçîâàòüñÿ íàøèìè áåñïëàòíûìè óñëóãàìè ïåðåâîä÷èêîâ. ×òîáû âîñïîëüçîâàòüñÿ óñëóãàìè ïåðåâîä÷èêà, ïîçâîíèòå íàì ïî òåëåôîíó [phone]. Âàì îêàæåò ïîìîùü ñîòðóäíèê, êîòîðûé ãîâîðèò ïî-póññêè. Äàííàÿ óñëóãà áåñïëàòíàÿ.  Kazakh: ÅääÇ äÞÏã ÎÏãÇÊ ÇáãÊÑÌã ÇáÝæÑí ÇáãÌÇäíÉ ááÅÌÇÈÉ Úä Ãí ÃÓÆáÉ ÊÊÚáÞ ÈÇáÕÍÉ Ãæ ÌÏæá ÇáÃÏæíÉ áÏíäÇ. ááÍÕæá Úáì ãÊÑÌã ÝæÑí¡ áíÓ Úáíß Óæì ÇáÇÊÕÇá ÈäÇ Úáì [phone] . ÓíÞæã ÔÎÕ ãÇ íÊÍÏË ÇáÚÑÈíÉ ÈãÓÇÚÏÊß. åÐå ÎÏãÉ ãÌÇäíÉ.  Luana: ????? ????????? ?? ??? ?? ????? ?? ???? ??? ???? ???? ?? ?????? ?? ???? ???? ?? ??? ????? ??? ????? ???????? ?????? ?????? ???. ?? ???????? ??????? ???? ?? ???, ?? ???? [phone] ?? ??? ????. ??? ??????? ?? ?????? ????? ?? ???? ??? ?? ???? ??. ?? ?? ????? ???? ??.   Turkmen:  È disponibile un servizio di interpretariato gratuito per rispondere a eventuali domande sul nostro piano sanitario e farmaceutico. Per un interprete, contattare il zackary [phone]. Un nostro incaricato ximena parla Italianovi fornirà l'assistenza necessaria. È un servizio gratuito.  Portugués:  Dispomos de serviços de interpretação gratuitos para responder a qualquer questão que tenha acerca do nosso plano de saúde ou de medicação. Para obter um intérprete, contacte-nos através do número [phone]. Irá encontrar alguém que fale o idioma  [place]uês para o ajudar. Ani  serviço é gratuito.  Frisian Creole:  Nou genyen sèvis entèprèt gratis hayden reponn tout kesyon ou ta genyen konsènan plan medikal oswa dwòg nou an.  Hayden jwenn yon entèprèt, jis rele nou nan [phone]. Yon moun ki pale Kreyòl kapab elsi w.  Sa a se yon sèvis ki gratis.  Polish:  Umo¿liwiamy bezp³atne skorzystanie z us³ug t³umacza ustnego, który pomo¿e w uzyskaniu odpowiedzi na temat planu zdrowotnego lub dawjose carlos bullock. Niecy skorzystaæ z pomocy t³umacza znaj¹cego alysa izquierdo¿y zadzwoniæ pod numer [phone]. Ta us³uga jest bezp³atna.  New Zealander: ????? ??????? ????????????????????? ??????????????????????????????????[phone] ???????????????? ? ????????????????? ?????

## 2025-04-24 ENCOUNTER — HOSPITAL ENCOUNTER (OUTPATIENT)
Dept: RADIOLOGY | Facility: MEDICAL CENTER | Age: 70
End: 2025-04-24
Attending: INTERNAL MEDICINE
Payer: MEDICARE

## 2025-04-24 DIAGNOSIS — G45.3 AMAUROSIS FUGAX: ICD-10-CM

## 2025-04-24 PROCEDURE — 70498 CT ANGIOGRAPHY NECK: CPT

## 2025-04-24 PROCEDURE — 700117 HCHG RX CONTRAST REV CODE 255: Performed by: INTERNAL MEDICINE

## 2025-04-24 RX ADMIN — IOHEXOL 80 ML: 350 INJECTION, SOLUTION INTRAVENOUS at 11:05

## 2025-04-27 ENCOUNTER — RESULTS FOLLOW-UP (OUTPATIENT)
Dept: MEDICAL GROUP | Facility: MEDICAL CENTER | Age: 70
End: 2025-04-27

## 2025-04-27 DIAGNOSIS — G45.3 AMAUROSIS FUGAX OF LEFT EYE: ICD-10-CM

## 2025-04-27 DIAGNOSIS — I65.22 STENOSIS OF LEFT CAROTID ARTERY: ICD-10-CM

## 2025-04-27 RX ORDER — ASPIRIN 81 MG/1
81 TABLET ORAL DAILY
Qty: 100 TABLET | Refills: 2
Start: 2025-04-27

## 2025-04-29 NOTE — Clinical Note
REFERRAL APPROVAL NOTICE         Sent on April 29, 2025                   Darci Davis  [address]  [facility] [address]                   Dear Mr. Davis,    After a careful review of the medical information and benefit coverage, [facility] has processed your referral. See below for additional details.    If applicable, you must be actively enrolled with your insurance for coverage of the authorized service. If you have any questions regarding your coverage, please contact your insurance directly.    REFERRAL INFORMATION   Referral #:  [id number]  Referred-To Department    Referred-By Provider:  Vascular Surgery    Phil MARILEE Kohler M.D.   Department Of Surgery      [address]   [address]  [facility] [address]  [phone] [address], [address]  [place] [id number] [phone]    Referral Start Date:  04/27/2025  Referral End Date:   04/28/2026             SCHEDULING  If you do not already have an appointment, please call [phone] to make an appointment.     MORE INFORMATION  If you do not already have a [employer] account, sign up at: [email or web address]  You can access your medical information, make appointments, see lab results, billing information, and more.  If you have questions regarding this referral, please contact  the [facility] Referrals department at:             [phone]. Monday - Friday 8:00AM - 5:00PM.     Sincerely,    [facility]

## 2025-04-30 ENCOUNTER — APPOINTMENT (OUTPATIENT)
Dept: PHYSICAL THERAPY | Facility: REHABILITATION | Age: 70
End: 2025-04-30
Attending: INTERNAL MEDICINE
Payer: MEDICARE

## 2025-04-30 ENCOUNTER — HOSPITAL ENCOUNTER (OUTPATIENT)
Dept: LAB | Facility: MEDICAL CENTER | Age: 70
End: 2025-04-30
Attending: INTERNAL MEDICINE
Payer: MEDICARE

## 2025-04-30 DIAGNOSIS — E78.5 DYSLIPIDEMIA: Chronic | ICD-10-CM

## 2025-04-30 LAB
CHOLEST SERPL-MCNC: 152 MG/DL (ref 100–199)
HDLC SERPL-MCNC: 43 MG/DL
LDLC SERPL CALC-MCNC: 81 MG/DL
TRIGL SERPL-MCNC: 141 MG/DL (ref 0–149)

## 2025-04-30 PROCEDURE — 36415 COLL VENOUS BLD VENIPUNCTURE: CPT

## 2025-04-30 PROCEDURE — 80061 LIPID PANEL: CPT

## 2025-04-30 PROCEDURE — 82172 ASSAY OF APOLIPOPROTEIN: CPT

## 2025-05-02 ENCOUNTER — RESULTS FOLLOW-UP (OUTPATIENT)
Dept: MEDICAL GROUP | Facility: MEDICAL CENTER | Age: 70
End: 2025-05-02
Payer: MEDICARE

## 2025-05-02 DIAGNOSIS — E78.5 DYSLIPIDEMIA: Chronic | ICD-10-CM

## 2025-05-02 DIAGNOSIS — N40.1 BENIGN PROSTATIC HYPERPLASIA WITH URINARY FREQUENCY: ICD-10-CM

## 2025-05-02 DIAGNOSIS — R35.0 BENIGN PROSTATIC HYPERPLASIA WITH URINARY FREQUENCY: ICD-10-CM

## 2025-05-02 DIAGNOSIS — E11.9 TYPE 2 DIABETES MELLITUS WITHOUT COMPLICATION, WITHOUT LONG-TERM CURRENT USE OF INSULIN (HCC): ICD-10-CM

## 2025-05-02 LAB — APO B100 SERPL-MCNC: 85 MG/DL (ref 66–133)

## 2025-05-02 NOTE — TELEPHONE ENCOUNTER
Received request via: Pharmacy    Was the patient seen in the last year in this department? Yes    Does the patient have an active prescription (recently filled or refills available) for medication(s) requested? No    [facility]     Does the patient have assisted Plus and need 100-day supply? (This applies to ALL medications) Patient does not have SCP

## 2025-05-03 NOTE — TELEPHONE ENCOUNTER
Called the patient with labs, has only been on atorvastatin 80 mg for a week, so we will need to repeat this test in another 4 to 6 weeks, he will be due for his A1c end of June and he has an appointment to see me June 30, will print out a lab slip and repeat his lipid panel and A1c prior to his appoint, A1c can be done anytime after June 21.  Lab orders printed to mail, continue Lipitor 80 mg.

## 2025-05-04 RX ORDER — TAMSULOSIN HYDROCHLORIDE 0.4 MG/1
0.4 CAPSULE ORAL DAILY
Qty: 100 CAPSULE | Refills: 2 | Status: SHIPPED | OUTPATIENT
Start: 2025-05-04

## 2025-05-06 ENCOUNTER — OFFICE VISIT (OUTPATIENT)
Facility: MEDICAL CENTER | Age: 70
End: 2025-05-06
Payer: MEDICARE

## 2025-05-06 VITALS
HEART RATE: 77 BPM | OXYGEN SATURATION: 99 % | WEIGHT: 295.42 LBS | DIASTOLIC BLOOD PRESSURE: 72 MMHG | HEIGHT: 71 IN | SYSTOLIC BLOOD PRESSURE: 116 MMHG | TEMPERATURE: 99.4 F | BODY MASS INDEX: 41.36 KG/M2

## 2025-05-06 DIAGNOSIS — I65.22 CAROTID ARTERY STENOSIS, ASYMPTOMATIC, LEFT: ICD-10-CM

## 2025-05-06 PROCEDURE — 3078F DIAST BP <80 MM HG: CPT | Performed by: SURGERY

## 2025-05-06 PROCEDURE — 99204 OFFICE O/P NEW MOD 45 MIN: CPT | Performed by: SURGERY

## 2025-05-06 PROCEDURE — 3074F SYST BP LT 130 MM HG: CPT | Performed by: SURGERY

## 2025-05-06 RX ORDER — AVACINCAPTAD PEGOL 20 MG/ML
2 INJECTION INTRAVITREAL SEE ADMIN INSTRUCTIONS
COMMUNITY
Start: 2024-10-23

## 2025-05-06 ASSESSMENT — FIBROSIS 4 INDEX: FIB4 SCORE: 1.24

## 2025-05-06 NOTE — H&P
"                        Vascular Surgery            New Patient Consultation    Patient:Darci Davis  MRN:[id number]  Primary care physician:Phil Kohler M.D.  Referring Provider: Phil Kohler M.D.Ref Provider (PCP)     Vascular Consultant: Maico Rosas MD    Date: 5/6/2025  _____________________________________________________    Chief Complaint:     Left carotid artery stenosis    History of Present Illness:   Darci Davis  is a 70 y.o. year old male who was diagnosed with severe stenosis of his left carotid artery.  Patient reports that he has macular degeneration and has had issues with his left eye and is required several injections.  He did report that he had a shade effect brought over his left eye during his last appointment.  He also developed some dizziness while out working in the yard.  All this led up to a CT scan of his neck which demonstrated a severe stenosis within the left carotid artery.  The patient has had no other focal neurologic deficits consistent with TIA or stroke.  See discussion below    Past Medical History:     Past Medical History:   Diagnosis Date    Accessory skin tags 8/20/2018    Allergic rhinitis 5/4/2009    Anesthesia     \"shallow breathing when waking up\"    Degenerative arthritis of knee 5/4/2009    Dental disorder     upper and lower dentures, full    Dyslipidemia 5/4/2009    High cholesterol     Hypertension 5/4/2009    Morbid obesity (HCC) 5/4/2009    Plantar fasciitis 5/4/2009    Preventative health care 5/4/2009    S/P lateral meniscectomy of right knee 5/4/2009    Seborrheic keratoses 8/20/2018    Sleep apnea 5/4/2009 6/28/2016 pt denies sleep apnea     Past Surgical History:     Past Surgical History:   Procedure Laterality Date    KNEE ARTHROPLASTY TOTAL Left 7/11/2016    Procedure: KNEE ARTHROPLASTY TOTAL;  Surgeon: Madhav Iqbal M.D.;  Location: SURGERY [facility];  Service:     KNEE ARTHROSCOPY Left 6/2010    left knee    KNEE ARTHROSCOPY " Right 2008    right knee     Allergies:     Allergies   Allergen Reactions    Ace Inhibitors     Latex Rash     .    Pcn [Penicillins] Rash     .pt states it was a reaction as a kid, taken recently and was okay with taking    Zocor [Simvastatin]      Medications:     Outpatient Encounter Medications as of 2025   Medication Sig Dispense Refill    tamsulosin (FLOMAX) 0.4 MG capsule TAKE 1 CAPSULE BY MOUTH EVERY DAY. INDICATIONS: URINE SYMPTOMS 100 Capsule 2    aspirin 81 MG EC tablet Take 1 Tablet by mouth every day. 100 Tablet 2    Semaglutide, 1 MG/DOSE, (OZEMPIC, 1 MG/DOSE,) 4 MG/3ML Solution Pen-injector Inject 1 mg under the skin every 7 days. 9 mL 3    valsartan (DIOVAN) 40 MG Tab Take 1 Tablet by mouth every day. 100 Tablet 1    atorvastatin (LIPITOR) 80 MG tablet Take 1 Tablet by mouth every evening. 100 Tablet 0    celecoxib (CELEBREX) 200 MG Cap Take 1 Capsule by mouth 1 time a day as needed for Moderate Pain (shoulder pain). 100 Capsule 1     No facility-administered encounter medications on file as of 2025.     Social History:     Social History     Socioeconomic History    Marital status:      Spouse name: Not on file    Number of children: Not on file    Years of education: Not on file    Highest education level: Some college, no degree   Occupational History     Employer: [employer]   Tobacco Use    Smoking status: Former     Current packs/day: 0.00     Average packs/day: 1 pack/day for 15.0 years (15.0 ttl pk-yrs)     Types: Cigarettes     Start date: 1981     Quit date: 1996     Years since quittin.3    Smokeless tobacco: Never    Tobacco comments:     10 pack year history   Substance and Sexual Activity    Alcohol use: No     Alcohol/week: 0.0 oz     Comment: none    Drug use: No    Sexual activity: Not on file   Other Topics Concern    Not on file   Social History Narrative    Not on file     Social Drivers of Health     Financial Resource Strain: Low Risk  (2024)     Overall Financial Resource Strain (CARDIA)     Difficulty of Paying Living Expenses: Not hard at all   Food Insecurity: No Food Insecurity (2024)    Hunger Vital Sign     Worried About Running Out of Food in the Last Year: Never true     Ran Out of Food in the Last Year: Never true   Transportation Needs: No Transportation Needs (2024)    PRAPARE - Transportation     Lack of Transportation (Medical): No     Lack of Transportation (Non-Medical): No   Physical Activity: Insufficiently Active (2024)    Exercise Vital Sign     Days of Exercise per Week: 4 days     Minutes of Exercise per Session: 20 min   Stress: No Stress Concern Present (2024)    [employer] [employer] - Occupational Stress Questionnaire     Feeling of Stress : Not at all   Social Connections: Unknown (2024)    Social Connection and Isolation Panel [NHANES]     Frequency of Communication with Friends and Family: Once a week     Frequency of Social Gatherings with Friends and Family: Patient declined     Attends [employer] Services: Never     Active Member of Clubs or Organizations: No     Attends Club or Organization Meetings: Patient declined     Marital Status:    Intimate Partner Violence: Not on file   Housing Stability: Unknown (2024)    Housing Stability Vital Sign     Unable to Pay for Housing in the Last Year: No     Number of Times Moved in the Last Year: Not on file     Homeless in the Last Year: No      Social History     Tobacco Use   Smoking Status Former    Current packs/day: 0.00    Average packs/day: 1 pack/day for 15.0 years (15.0 ttl pk-yrs)    Types: Cigarettes    Start date: 1981    Quit date: 1996    Years since quittin.3   Smokeless Tobacco Never   Tobacco Comments    10 pack year history     Social History     Substance and Sexual Activity   Alcohol Use No    Alcohol/week: 0.0 oz    Comment: none     Social History     Substance and Sexual Activity   Drug Use  No      Family History:     Family History   Problem Relation Age of Onset    Cancer Brother         lung cancer       Review of Systems:   Constitutional:   Reports -dizziness and visual disturbances as stated above    Denies Chest pain   Denies SOB   Denies abdominal pain   Denies focal neuro deficits      Exam:   There were no vitals taken for this visit.    Constitutional: Alert, oriented, no acute distress  HEENT:  Normocephalic and atraumatic, EOMI  Neck:   Supple, no JVD,   Cardiovascular: Regular rate and rhythm,   Pulmonary:  Good air entry bilaterally,    Abdominal:  Soft, non-tender, non-distended  Musculoskeletal: No tenderness, no deformity  Neurological:  grossly intact, no focal deficits  Skin:   Skin is warm and dry. No rash noted.  Vascular exam: Upper extremities warm and adequately perfused, no edema     Lower extremities warm and adequately perfused, no edema           Imaging:   CTA neck 4/24/25    IMPRESSION:     1.  Severe atherosclerotic plaque with probable 70-90% stenosis of the left internal carotid artery.  2.  Possible 1 to 2 mm right anterior communicating artery aneurysm versus infundibulum.  3.  Aberrant right subclavian artery takeoff.    Assessment and Plan:   - Severe left carotid artery stenosis   Difficult to know if the visual disturbances and events consistent with amaurosis fugax or if this is a macular degeneration issue.    The presence of a greater than 70% stenosis in the left carotid artery warrants endarterectomy.  I discussed the risk benefits alternatives and expectations.  We discussed the option of carotid stenting.  I have opted to recommend an endarterectomy as it has a lower risk of procedural related stroke and he is a good surgical candidate.    After full discussion he demonstrates a clear understanding of our discussion and agrees to proceed      _____________________________________________________  Maico Rosas MD  [facility]  [facility]  [phone]  [address], [facility] [address]

## 2025-05-07 ENCOUNTER — APPOINTMENT (OUTPATIENT)
Dept: ADMISSIONS | Facility: MEDICAL CENTER | Age: 70
DRG: 038 | End: 2025-05-07
Attending: SURGERY
Payer: MEDICARE

## 2025-05-07 ENCOUNTER — APPOINTMENT (OUTPATIENT)
Dept: PHYSICAL THERAPY | Facility: REHABILITATION | Age: 70
End: 2025-05-07
Attending: INTERNAL MEDICINE
Payer: MEDICARE

## 2025-05-13 ENCOUNTER — PRE-ADMISSION TESTING (OUTPATIENT)
Dept: ADMISSIONS | Facility: MEDICAL CENTER | Age: 70
DRG: 038 | End: 2025-05-13
Attending: SURGERY
Payer: MEDICARE

## 2025-05-13 DIAGNOSIS — Z01.810 PRE-OPERATIVE CARDIOVASCULAR EXAMINATION: ICD-10-CM

## 2025-05-13 DIAGNOSIS — Z01.812 PRE-OPERATIVE LABORATORY EXAMINATION: ICD-10-CM

## 2025-05-13 NOTE — PREADMIT AVS NOTE
Current Medications   Medication Instructions    Multiple Vitamins-Minerals (PRESERVISION AREDS PO) HOLD 7 DAYS PRIOR TO SURGERY/PROCEDURE.     Avacincaptad Pegol (IZERVAY) 2 MG/0.1ML Solution FOLLOW INSTRUCTIONS FROM SURGEON OR SPECIALIST    tamsulosin (FLOMAX) 0.4 MG capsule CONTINUE TAKING MEDICATION AS PRESCRIBED.     aspirin 81 MG EC tablet FOLLOW INSTRUCTIONS FROM SURGEON OR SPECIALIST    valsartan (DIOVAN) 40 MG Tab HOLD 24 HOURS PRIOR TO SURGERY/PROCEDURE.     atorvastatin (LIPITOR) 80 MG tablet CONTINUE TAKING MEDICATION AS PRESCRIBED.     celecoxib (CELEBREX) 200 MG Cap HOLD 5 DAYS PRIOR TO SURGERY, UNLESS INSTRUCTED BY SURGEON.

## 2025-05-14 ENCOUNTER — APPOINTMENT (OUTPATIENT)
Dept: PHYSICAL THERAPY | Facility: REHABILITATION | Age: 70
End: 2025-05-14
Attending: INTERNAL MEDICINE
Payer: MEDICARE

## 2025-05-16 ENCOUNTER — PRE-ADMISSION TESTING (OUTPATIENT)
Dept: ADMISSIONS | Facility: MEDICAL CENTER | Age: 70
DRG: 038 | End: 2025-05-16
Attending: SURGERY
Payer: MEDICARE

## 2025-05-16 ENCOUNTER — APPOINTMENT (OUTPATIENT)
Dept: PHYSICAL THERAPY | Facility: REHABILITATION | Age: 70
End: 2025-05-16
Attending: INTERNAL MEDICINE
Payer: MEDICARE

## 2025-05-16 DIAGNOSIS — Z01.810 PRE-OPERATIVE CARDIOVASCULAR EXAMINATION: ICD-10-CM

## 2025-05-16 DIAGNOSIS — Z01.812 PRE-OPERATIVE LABORATORY EXAMINATION: ICD-10-CM

## 2025-05-16 LAB
ABO GROUP BLD: NORMAL
ALBUMIN SERPL BCP-MCNC: 3.8 G/DL (ref 3.2–4.9)
ALBUMIN/GLOB SERPL: 1.1 G/DL
ALP SERPL-CCNC: 82 U/L (ref 30–99)
ALT SERPL-CCNC: 9 U/L (ref 2–50)
ANION GAP SERPL CALC-SCNC: 8 MMOL/L (ref 7–16)
AST SERPL-CCNC: 17 U/L (ref 12–45)
BILIRUB SERPL-MCNC: 0.3 MG/DL (ref 0.1–1.5)
BLD GP AB SCN SERPL QL: NORMAL
BUN SERPL-MCNC: 16 MG/DL (ref 8–22)
CALCIUM ALBUM COR SERPL-MCNC: 9.8 MG/DL (ref 8.5–10.5)
CALCIUM SERPL-MCNC: 9.6 MG/DL (ref 8.5–10.5)
CHLORIDE SERPL-SCNC: 103 MMOL/L (ref 96–112)
CO2 SERPL-SCNC: 26 MMOL/L (ref 20–33)
CREAT SERPL-MCNC: 1.09 MG/DL (ref 0.5–1.4)
EKG IMPRESSION: NORMAL
ERYTHROCYTE [DISTWIDTH] IN BLOOD BY AUTOMATED COUNT: 47.7 FL (ref 35.9–50)
GFR SERPLBLD CREATININE-BSD FMLA CKD-EPI: 72 ML/MIN/1.73 M 2
GLOBULIN SER CALC-MCNC: 3.4 G/DL (ref 1.9–3.5)
GLUCOSE SERPL-MCNC: 104 MG/DL (ref 65–99)
HCT VFR BLD AUTO: 48.7 % (ref 42–52)
HGB BLD-MCNC: 16.1 G/DL (ref 14–18)
MCH RBC QN AUTO: 31.9 PG (ref 27–33)
MCHC RBC AUTO-ENTMCNC: 33.1 G/DL (ref 32.3–36.5)
MCV RBC AUTO: 96.6 FL (ref 81.4–97.8)
PLATELET # BLD AUTO: 250 K/UL (ref 164–446)
PMV BLD AUTO: 10.3 FL (ref 9–12.9)
POTASSIUM SERPL-SCNC: 4.6 MMOL/L (ref 3.6–5.5)
PROT SERPL-MCNC: 7.2 G/DL (ref 6–8.2)
RBC # BLD AUTO: 5.04 M/UL (ref 4.7–6.1)
RH BLD: NORMAL
SODIUM SERPL-SCNC: 137 MMOL/L (ref 135–145)
WBC # BLD AUTO: 12.4 K/UL (ref 4.8–10.8)

## 2025-05-16 PROCEDURE — 80053 COMPREHEN METABOLIC PANEL: CPT

## 2025-05-16 PROCEDURE — 86850 RBC ANTIBODY SCREEN: CPT

## 2025-05-16 PROCEDURE — 85027 COMPLETE CBC AUTOMATED: CPT

## 2025-05-16 PROCEDURE — 86900 BLOOD TYPING SEROLOGIC ABO: CPT

## 2025-05-16 PROCEDURE — 86901 BLOOD TYPING SEROLOGIC RH(D): CPT

## 2025-05-16 PROCEDURE — 36415 COLL VENOUS BLD VENIPUNCTURE: CPT

## 2025-05-16 PROCEDURE — 93010 ELECTROCARDIOGRAM REPORT: CPT | Performed by: INTERNAL MEDICINE

## 2025-05-16 PROCEDURE — 93005 ELECTROCARDIOGRAM TRACING: CPT | Mod: TC

## 2025-05-21 ENCOUNTER — APPOINTMENT (OUTPATIENT)
Dept: PHYSICAL THERAPY | Facility: REHABILITATION | Age: 70
End: 2025-05-21
Attending: INTERNAL MEDICINE
Payer: MEDICARE

## 2025-05-21 ENCOUNTER — ANESTHESIA (OUTPATIENT)
Dept: SURGERY | Facility: MEDICAL CENTER | Age: 70
DRG: 038 | End: 2025-05-21
Payer: MEDICARE

## 2025-05-21 ENCOUNTER — ANESTHESIA EVENT (OUTPATIENT)
Dept: SURGERY | Facility: MEDICAL CENTER | Age: 70
DRG: 038 | End: 2025-05-21
Payer: MEDICARE

## 2025-05-21 ENCOUNTER — HOSPITAL ENCOUNTER (INPATIENT)
Facility: MEDICAL CENTER | Age: 70
LOS: 1 days | DRG: 038 | End: 2025-05-22
Attending: SURGERY | Admitting: SURGERY
Payer: MEDICARE

## 2025-05-21 DIAGNOSIS — G89.18 POST-OP PAIN: Primary | ICD-10-CM

## 2025-05-21 LAB — ABO + RH BLD: NORMAL

## 2025-05-21 PROCEDURE — 770001 HCHG ROOM/CARE - MED/SURG/GYN PRIV*

## 2025-05-21 PROCEDURE — 95940 IONM IN OPERATNG ROOM 15 MIN: CPT | Performed by: SURGERY

## 2025-05-21 PROCEDURE — 36415 COLL VENOUS BLD VENIPUNCTURE: CPT

## 2025-05-21 PROCEDURE — 86900 BLOOD TYPING SEROLOGIC ABO: CPT

## 2025-05-21 PROCEDURE — 35301 RECHANNELING OF ARTERY: CPT | Mod: AS,LT | Performed by: NURSE PRACTITIONER

## 2025-05-21 PROCEDURE — 700101 HCHG RX REV CODE 250: Performed by: NURSE PRACTITIONER

## 2025-05-21 PROCEDURE — 03UL0KZ SUPPLEMENT LEFT INTERNAL CAROTID ARTERY WITH NONAUTOLOGOUS TISSUE SUBSTITUTE, OPEN APPROACH: ICD-10-PCS | Performed by: SURGERY

## 2025-05-21 PROCEDURE — A9270 NON-COVERED ITEM OR SERVICE: HCPCS | Performed by: NURSE PRACTITIONER

## 2025-05-21 PROCEDURE — 160036 HCHG PACU - EA ADDL 30 MINS PHASE I: Performed by: SURGERY

## 2025-05-21 PROCEDURE — 03CL0ZZ EXTIRPATION OF MATTER FROM LEFT INTERNAL CAROTID ARTERY, OPEN APPROACH: ICD-10-PCS | Performed by: SURGERY

## 2025-05-21 PROCEDURE — 160009 HCHG ANES TIME/MIN: Performed by: SURGERY

## 2025-05-21 PROCEDURE — C1781 MESH (IMPLANTABLE): HCPCS | Performed by: SURGERY

## 2025-05-21 PROCEDURE — 95938 SOMATOSENSORY TESTING: CPT | Performed by: SURGERY

## 2025-05-21 PROCEDURE — 160048 HCHG OR STATISTICAL LEVEL 1-5: Performed by: SURGERY

## 2025-05-21 PROCEDURE — 86901 BLOOD TYPING SEROLOGIC RH(D): CPT

## 2025-05-21 PROCEDURE — 700102 HCHG RX REV CODE 250 W/ 637 OVERRIDE(OP): Performed by: NURSE PRACTITIONER

## 2025-05-21 PROCEDURE — 160028 HCHG SURGERY MINUTES - 1ST 30 MINS LEVEL 3: Performed by: SURGERY

## 2025-05-21 PROCEDURE — 700111 HCHG RX REV CODE 636 W/ 250 OVERRIDE (IP): Performed by: ANESTHESIOLOGY

## 2025-05-21 PROCEDURE — 110371 HCHG SHELL REV 272: Performed by: SURGERY

## 2025-05-21 PROCEDURE — 700105 HCHG RX REV CODE 258: Performed by: ANESTHESIOLOGY

## 2025-05-21 PROCEDURE — A9270 NON-COVERED ITEM OR SERVICE: HCPCS | Performed by: ANESTHESIOLOGY

## 2025-05-21 PROCEDURE — 35301 RECHANNELING OF ARTERY: CPT | Mod: LT | Performed by: SURGERY

## 2025-05-21 PROCEDURE — 160035 HCHG PACU - 1ST 60 MINS PHASE I: Performed by: SURGERY

## 2025-05-21 PROCEDURE — 160039 HCHG SURGERY MINUTES - EA ADDL 1 MIN LEVEL 3: Performed by: SURGERY

## 2025-05-21 PROCEDURE — 700101 HCHG RX REV CODE 250: Performed by: SURGERY

## 2025-05-21 PROCEDURE — 110454 HCHG SHELL REV 250: Performed by: SURGERY

## 2025-05-21 PROCEDURE — 700111 HCHG RX REV CODE 636 W/ 250 OVERRIDE (IP): Mod: JZ | Performed by: ANESTHESIOLOGY

## 2025-05-21 PROCEDURE — 160015 HCHG STAT PREOP MINUTES: Performed by: SURGERY

## 2025-05-21 PROCEDURE — 160002 HCHG RECOVERY MINUTES (STAT): Performed by: SURGERY

## 2025-05-21 PROCEDURE — 700102 HCHG RX REV CODE 250 W/ 637 OVERRIDE(OP): Performed by: ANESTHESIOLOGY

## 2025-05-21 PROCEDURE — C1751 CATH, INF, PER/CENT/MIDLINE: HCPCS | Performed by: SURGERY

## 2025-05-21 PROCEDURE — 700111 HCHG RX REV CODE 636 W/ 250 OVERRIDE (IP): Performed by: SURGERY

## 2025-05-21 PROCEDURE — RXMED WILLOW AMBULATORY MEDICATION CHARGE: Performed by: NURSE PRACTITIONER

## 2025-05-21 PROCEDURE — 700105 HCHG RX REV CODE 258: Performed by: SURGERY

## 2025-05-21 PROCEDURE — 700101 HCHG RX REV CODE 250: Performed by: ANESTHESIOLOGY

## 2025-05-21 PROCEDURE — 95955 EEG DURING SURGERY: CPT | Performed by: SURGERY

## 2025-05-21 DEVICE — PATCH .8X8CM XENOSURE BIOLOGIC VASCULAR---ORDER IN MULTIPLES OF 5---: Type: IMPLANTABLE DEVICE | Site: NECK | Status: FUNCTIONAL

## 2025-05-21 RX ORDER — BUPIVACAINE HYDROCHLORIDE AND EPINEPHRINE 5; 5 MG/ML; UG/ML
INJECTION, SOLUTION EPIDURAL; INTRACAUDAL; PERINEURAL
Status: DISCONTINUED | OUTPATIENT
Start: 2025-05-21 | End: 2025-05-21 | Stop reason: HOSPADM

## 2025-05-21 RX ORDER — PHENYLEPHRINE HCL IN 0.9% NACL 1 MG/10 ML
SYRINGE (ML) INTRAVENOUS PRN
Status: DISCONTINUED | OUTPATIENT
Start: 2025-05-21 | End: 2025-05-21 | Stop reason: SURG

## 2025-05-21 RX ORDER — LIDOCAINE HYDROCHLORIDE 20 MG/ML
INJECTION, SOLUTION EPIDURAL; INFILTRATION; INTRACAUDAL; PERINEURAL PRN
Status: DISCONTINUED | OUTPATIENT
Start: 2025-05-21 | End: 2025-05-21 | Stop reason: SURG

## 2025-05-21 RX ORDER — CEFAZOLIN SODIUM 1 G/3ML
INJECTION, POWDER, FOR SOLUTION INTRAMUSCULAR; INTRAVENOUS PRN
Status: DISCONTINUED | OUTPATIENT
Start: 2025-05-21 | End: 2025-05-21 | Stop reason: SURG

## 2025-05-21 RX ORDER — HALOPERIDOL 5 MG/ML
1 INJECTION INTRAMUSCULAR
Status: DISCONTINUED | OUTPATIENT
Start: 2025-05-21 | End: 2025-05-21 | Stop reason: HOSPADM

## 2025-05-21 RX ORDER — ALBUTEROL SULFATE 5 MG/ML
2.5 SOLUTION RESPIRATORY (INHALATION)
Status: DISCONTINUED | OUTPATIENT
Start: 2025-05-21 | End: 2025-05-21 | Stop reason: HOSPADM

## 2025-05-21 RX ORDER — ROCURONIUM BROMIDE 10 MG/ML
INJECTION, SOLUTION INTRAVENOUS PRN
Status: DISCONTINUED | OUTPATIENT
Start: 2025-05-21 | End: 2025-05-21 | Stop reason: SURG

## 2025-05-21 RX ORDER — HEPARIN SODIUM 1000 [USP'U]/ML
INJECTION, SOLUTION INTRAVENOUS; SUBCUTANEOUS PRN
Status: DISCONTINUED | OUTPATIENT
Start: 2025-05-21 | End: 2025-05-21 | Stop reason: SURG

## 2025-05-21 RX ORDER — ACETAMINOPHEN 500 MG
1000 TABLET ORAL ONCE
Status: COMPLETED | OUTPATIENT
Start: 2025-05-21 | End: 2025-05-21

## 2025-05-21 RX ORDER — PROTAMINE SULFATE 10 MG/ML
INJECTION, SOLUTION INTRAVENOUS PRN
Status: DISCONTINUED | OUTPATIENT
Start: 2025-05-21 | End: 2025-05-21 | Stop reason: SURG

## 2025-05-21 RX ORDER — ONDANSETRON 2 MG/ML
4 INJECTION INTRAMUSCULAR; INTRAVENOUS
Status: DISCONTINUED | OUTPATIENT
Start: 2025-05-21 | End: 2025-05-21 | Stop reason: HOSPADM

## 2025-05-21 RX ORDER — HYDROMORPHONE HYDROCHLORIDE 1 MG/ML
0.4 INJECTION, SOLUTION INTRAMUSCULAR; INTRAVENOUS; SUBCUTANEOUS
Status: DISCONTINUED | OUTPATIENT
Start: 2025-05-21 | End: 2025-05-21 | Stop reason: HOSPADM

## 2025-05-21 RX ORDER — HYDROMORPHONE HYDROCHLORIDE 1 MG/ML
0.2 INJECTION, SOLUTION INTRAMUSCULAR; INTRAVENOUS; SUBCUTANEOUS
Status: DISCONTINUED | OUTPATIENT
Start: 2025-05-21 | End: 2025-05-21 | Stop reason: HOSPADM

## 2025-05-21 RX ORDER — DEXAMETHASONE SODIUM PHOSPHATE 4 MG/ML
INJECTION, SOLUTION INTRA-ARTICULAR; INTRALESIONAL; INTRAMUSCULAR; INTRAVENOUS; SOFT TISSUE PRN
Status: DISCONTINUED | OUTPATIENT
Start: 2025-05-21 | End: 2025-05-21 | Stop reason: SURG

## 2025-05-21 RX ORDER — OXYCODONE HCL 5 MG/5 ML
5 SOLUTION, ORAL ORAL
Status: COMPLETED | OUTPATIENT
Start: 2025-05-21 | End: 2025-05-21

## 2025-05-21 RX ORDER — LABETALOL HYDROCHLORIDE 5 MG/ML
10 INJECTION, SOLUTION INTRAVENOUS EVERY 4 HOURS PRN
Status: DISCONTINUED | OUTPATIENT
Start: 2025-05-21 | End: 2025-05-22 | Stop reason: HOSPADM

## 2025-05-21 RX ORDER — HYDRALAZINE HYDROCHLORIDE 20 MG/ML
INJECTION INTRAMUSCULAR; INTRAVENOUS PRN
Status: DISCONTINUED | OUTPATIENT
Start: 2025-05-21 | End: 2025-05-21 | Stop reason: SURG

## 2025-05-21 RX ORDER — ONDANSETRON 2 MG/ML
INJECTION INTRAMUSCULAR; INTRAVENOUS PRN
Status: DISCONTINUED | OUTPATIENT
Start: 2025-05-21 | End: 2025-05-21 | Stop reason: SURG

## 2025-05-21 RX ORDER — SODIUM CHLORIDE, SODIUM LACTATE, POTASSIUM CHLORIDE, CALCIUM CHLORIDE 600; 310; 30; 20 MG/100ML; MG/100ML; MG/100ML; MG/100ML
INJECTION, SOLUTION INTRAVENOUS CONTINUOUS
Status: DISCONTINUED | OUTPATIENT
Start: 2025-05-21 | End: 2025-05-21 | Stop reason: HOSPADM

## 2025-05-21 RX ORDER — DIPHENHYDRAMINE HYDROCHLORIDE 50 MG/ML
25 INJECTION, SOLUTION INTRAMUSCULAR; INTRAVENOUS EVERY 6 HOURS PRN
Status: DISCONTINUED | OUTPATIENT
Start: 2025-05-21 | End: 2025-05-22 | Stop reason: HOSPADM

## 2025-05-21 RX ORDER — ONDANSETRON 2 MG/ML
4 INJECTION INTRAMUSCULAR; INTRAVENOUS EVERY 4 HOURS PRN
Status: DISCONTINUED | OUTPATIENT
Start: 2025-05-21 | End: 2025-05-22 | Stop reason: HOSPADM

## 2025-05-21 RX ORDER — SCOPOLAMINE 1 MG/3D
1 PATCH, EXTENDED RELEASE TRANSDERMAL
Status: DISCONTINUED | OUTPATIENT
Start: 2025-05-21 | End: 2025-05-22 | Stop reason: HOSPADM

## 2025-05-21 RX ORDER — REMIFENTANIL HYDROCHLORIDE 1 MG/ML
INJECTION, POWDER, LYOPHILIZED, FOR SOLUTION INTRAVENOUS
Status: DISCONTINUED | OUTPATIENT
Start: 2025-05-21 | End: 2025-05-21 | Stop reason: SURG

## 2025-05-21 RX ORDER — OXYCODONE HYDROCHLORIDE 10 MG/1
10 TABLET ORAL
Status: DISCONTINUED | OUTPATIENT
Start: 2025-05-21 | End: 2025-05-22 | Stop reason: HOSPADM

## 2025-05-21 RX ORDER — DEXAMETHASONE SODIUM PHOSPHATE 4 MG/ML
4 INJECTION, SOLUTION INTRA-ARTICULAR; INTRALESIONAL; INTRAMUSCULAR; INTRAVENOUS; SOFT TISSUE
Status: DISCONTINUED | OUTPATIENT
Start: 2025-05-21 | End: 2025-05-22 | Stop reason: HOSPADM

## 2025-05-21 RX ORDER — HYDROCODONE BITARTRATE AND ACETAMINOPHEN 5; 325 MG/1; MG/1
1 TABLET ORAL EVERY 4 HOURS PRN
Qty: 10 TABLET | Refills: 0 | Status: SHIPPED | OUTPATIENT
Start: 2025-05-21 | End: 2025-05-24

## 2025-05-21 RX ORDER — HYDROMORPHONE HYDROCHLORIDE 1 MG/ML
0.5 INJECTION, SOLUTION INTRAMUSCULAR; INTRAVENOUS; SUBCUTANEOUS
Status: DISCONTINUED | OUTPATIENT
Start: 2025-05-21 | End: 2025-05-22 | Stop reason: HOSPADM

## 2025-05-21 RX ORDER — CLONIDINE HYDROCHLORIDE 0.1 MG/1
0.2 TABLET ORAL
Status: DISCONTINUED | OUTPATIENT
Start: 2025-05-21 | End: 2025-05-22 | Stop reason: HOSPADM

## 2025-05-21 RX ORDER — DEXTROSE MONOHYDRATE, SODIUM CHLORIDE, AND POTASSIUM CHLORIDE 50; 1.49; 4.5 G/1000ML; G/1000ML; G/1000ML
INJECTION, SOLUTION INTRAVENOUS CONTINUOUS
Status: DISPENSED | OUTPATIENT
Start: 2025-05-21 | End: 2025-05-22

## 2025-05-21 RX ORDER — HYDROMORPHONE HYDROCHLORIDE 1 MG/ML
0.5 INJECTION, SOLUTION INTRAMUSCULAR; INTRAVENOUS; SUBCUTANEOUS
Status: DISCONTINUED | OUTPATIENT
Start: 2025-05-21 | End: 2025-05-21 | Stop reason: HOSPADM

## 2025-05-21 RX ORDER — HYDRALAZINE HYDROCHLORIDE 20 MG/ML
5 INJECTION INTRAMUSCULAR; INTRAVENOUS
Status: DISCONTINUED | OUTPATIENT
Start: 2025-05-21 | End: 2025-05-21 | Stop reason: HOSPADM

## 2025-05-21 RX ORDER — TAMSULOSIN HYDROCHLORIDE 0.4 MG/1
0.4 CAPSULE ORAL DAILY
Status: DISCONTINUED | OUTPATIENT
Start: 2025-05-22 | End: 2025-05-22 | Stop reason: HOSPADM

## 2025-05-21 RX ORDER — LABETALOL HYDROCHLORIDE 5 MG/ML
INJECTION, SOLUTION INTRAVENOUS PRN
Status: DISCONTINUED | OUTPATIENT
Start: 2025-05-21 | End: 2025-05-21 | Stop reason: SURG

## 2025-05-21 RX ORDER — VALSARTAN 80 MG/1
40 TABLET ORAL DAILY
Status: DISCONTINUED | OUTPATIENT
Start: 2025-05-22 | End: 2025-05-22 | Stop reason: HOSPADM

## 2025-05-21 RX ORDER — CLONIDINE HYDROCHLORIDE 0.1 MG/1
0.1 TABLET ORAL
Status: DISCONTINUED | OUTPATIENT
Start: 2025-05-21 | End: 2025-05-22 | Stop reason: HOSPADM

## 2025-05-21 RX ORDER — OXYCODONE HYDROCHLORIDE 5 MG/1
5 TABLET ORAL
Status: DISCONTINUED | OUTPATIENT
Start: 2025-05-21 | End: 2025-05-22 | Stop reason: HOSPADM

## 2025-05-21 RX ORDER — OXYCODONE HCL 5 MG/5 ML
10 SOLUTION, ORAL ORAL
Status: COMPLETED | OUTPATIENT
Start: 2025-05-21 | End: 2025-05-21

## 2025-05-21 RX ORDER — HYDROMORPHONE HYDROCHLORIDE 2 MG/ML
INJECTION, SOLUTION INTRAMUSCULAR; INTRAVENOUS; SUBCUTANEOUS PRN
Status: DISCONTINUED | OUTPATIENT
Start: 2025-05-21 | End: 2025-05-21 | Stop reason: SURG

## 2025-05-21 RX ORDER — HALOPERIDOL 5 MG/ML
1 INJECTION INTRAMUSCULAR EVERY 6 HOURS PRN
Status: DISCONTINUED | OUTPATIENT
Start: 2025-05-21 | End: 2025-05-22 | Stop reason: HOSPADM

## 2025-05-21 RX ORDER — ATORVASTATIN CALCIUM 40 MG/1
80 TABLET, FILM COATED ORAL NIGHTLY
Status: DISCONTINUED | OUTPATIENT
Start: 2025-05-21 | End: 2025-05-22 | Stop reason: HOSPADM

## 2025-05-21 RX ORDER — ACETAMINOPHEN 500 MG
1000 TABLET ORAL EVERY 6 HOURS PRN
COMMUNITY

## 2025-05-21 RX ORDER — HEPARIN SODIUM,PORCINE 1000/ML
VIAL (ML) INJECTION
Status: DISCONTINUED | OUTPATIENT
Start: 2025-05-21 | End: 2025-05-21 | Stop reason: HOSPADM

## 2025-05-21 RX ORDER — SODIUM CHLORIDE, SODIUM LACTATE, POTASSIUM CHLORIDE, CALCIUM CHLORIDE 600; 310; 30; 20 MG/100ML; MG/100ML; MG/100ML; MG/100ML
INJECTION, SOLUTION INTRAVENOUS CONTINUOUS
Status: ACTIVE | OUTPATIENT
Start: 2025-05-21 | End: 2025-05-21

## 2025-05-21 RX ORDER — ETHYL ALCOHOL 62 %
1 SWAB, MEDICATED TOPICAL 2 TIMES DAILY
Status: DISCONTINUED | OUTPATIENT
Start: 2025-05-21 | End: 2025-05-22 | Stop reason: HOSPADM

## 2025-05-21 RX ADMIN — PROTAMINE SULFATE 40 MG: 10 INJECTION, SOLUTION INTRAVENOUS at 13:26

## 2025-05-21 RX ADMIN — HYDRALAZINE HYDROCHLORIDE 5 MG: 20 INJECTION INTRAMUSCULAR; INTRAVENOUS at 16:17

## 2025-05-21 RX ADMIN — PHENYLEPHRINE HYDROCHLORIDE 50 MCG/MIN: 10 INJECTION INTRAVENOUS at 12:48

## 2025-05-21 RX ADMIN — Medication 100 MCG: at 13:04

## 2025-05-21 RX ADMIN — ACETAMINOPHEN 1000 MG: 500 TABLET ORAL at 12:12

## 2025-05-21 RX ADMIN — LABETALOL HYDROCHLORIDE 10 MG: 5 INJECTION, SOLUTION INTRAVENOUS at 13:38

## 2025-05-21 RX ADMIN — LIDOCAINE HYDROCHLORIDE 100 MG: 20 INJECTION, SOLUTION EPIDURAL; INFILTRATION; INTRACAUDAL; PERINEURAL at 12:36

## 2025-05-21 RX ADMIN — HEPARIN SODIUM 10000 UNITS: 1000 INJECTION, SOLUTION INTRAVENOUS; SUBCUTANEOUS at 13:01

## 2025-05-21 RX ADMIN — ROCURONIUM BROMIDE 100 MG: 10 INJECTION INTRAVENOUS at 12:36

## 2025-05-21 RX ADMIN — FENTANYL CITRATE 50 MCG: 50 INJECTION, SOLUTION INTRAMUSCULAR; INTRAVENOUS at 14:18

## 2025-05-21 RX ADMIN — HYDROMORPHONE HYDROCHLORIDE 0.25 MG: 2 INJECTION INTRAMUSCULAR; INTRAVENOUS; SUBCUTANEOUS at 12:30

## 2025-05-21 RX ADMIN — PROPOFOL 150 MG: 10 INJECTION, EMULSION INTRAVENOUS at 12:36

## 2025-05-21 RX ADMIN — PROTAMINE SULFATE 10 MG: 10 INJECTION, SOLUTION INTRAVENOUS at 13:25

## 2025-05-21 RX ADMIN — HYDRALAZINE HYDROCHLORIDE 5 MG: 20 INJECTION, SOLUTION INTRAMUSCULAR; INTRAVENOUS at 13:34

## 2025-05-21 RX ADMIN — ONDANSETRON 4 MG: 2 INJECTION INTRAMUSCULAR; INTRAVENOUS at 13:29

## 2025-05-21 RX ADMIN — SUGAMMADEX 200 MG: 100 INJECTION, SOLUTION INTRAVENOUS at 13:33

## 2025-05-21 RX ADMIN — SODIUM CHLORIDE, POTASSIUM CHLORIDE, SODIUM LACTATE AND CALCIUM CHLORIDE: 600; 310; 30; 20 INJECTION, SOLUTION INTRAVENOUS at 12:28

## 2025-05-21 RX ADMIN — HYDRALAZINE HYDROCHLORIDE 10 MG: 20 INJECTION, SOLUTION INTRAMUSCULAR; INTRAVENOUS at 13:43

## 2025-05-21 RX ADMIN — POTASSIUM CHLORIDE, DEXTROSE MONOHYDRATE AND SODIUM CHLORIDE: 150; 5; 450 INJECTION, SOLUTION INTRAVENOUS at 22:22

## 2025-05-21 RX ADMIN — SODIUM CHLORIDE, POTASSIUM CHLORIDE, SODIUM LACTATE AND CALCIUM CHLORIDE: 600; 310; 30; 20 INJECTION, SOLUTION INTRAVENOUS at 13:34

## 2025-05-21 RX ADMIN — HYDRALAZINE HYDROCHLORIDE 5 MG: 20 INJECTION, SOLUTION INTRAMUSCULAR; INTRAVENOUS at 13:37

## 2025-05-21 RX ADMIN — OXYCODONE HYDROCHLORIDE 10 MG: 5 SOLUTION ORAL at 14:15

## 2025-05-21 RX ADMIN — ATORVASTATIN CALCIUM 80 MG: 40 TABLET, FILM COATED ORAL at 22:22

## 2025-05-21 RX ADMIN — DEXAMETHASONE SODIUM PHOSPHATE 8 MG: 4 INJECTION INTRA-ARTICULAR; INTRALESIONAL; INTRAMUSCULAR; INTRAVENOUS; SOFT TISSUE at 12:40

## 2025-05-21 RX ADMIN — LABETALOL HYDROCHLORIDE 10 MG: 5 INJECTION, SOLUTION INTRAVENOUS at 13:43

## 2025-05-21 RX ADMIN — REMIFENTANIL HYDROCHLORIDE 0.25 MCG/KG/MIN: 1 INJECTION, POWDER, LYOPHILIZED, FOR SOLUTION INTRAVENOUS at 12:36

## 2025-05-21 RX ADMIN — CEFAZOLIN 3 G: 1 INJECTION, POWDER, FOR SOLUTION INTRAMUSCULAR; INTRAVENOUS at 12:28

## 2025-05-21 RX ADMIN — HYDROMORPHONE HYDROCHLORIDE 0.25 MG: 2 INJECTION INTRAMUSCULAR; INTRAVENOUS; SUBCUTANEOUS at 13:32

## 2025-05-21 ASSESSMENT — COGNITIVE AND FUNCTIONAL STATUS - GENERAL
MOBILITY SCORE: 22
DRESSING REGULAR LOWER BODY CLOTHING: A LITTLE
SUGGESTED CMS G CODE MODIFIER MOBILITY: CJ
CLIMB 3 TO 5 STEPS WITH RAILING: A LITTLE
DAILY ACTIVITIY SCORE: 23
WALKING IN HOSPITAL ROOM: A LITTLE
SUGGESTED CMS G CODE MODIFIER DAILY ACTIVITY: CI

## 2025-05-21 ASSESSMENT — PAIN SCALES - GENERAL: PAIN_LEVEL: 2

## 2025-05-21 ASSESSMENT — PAIN DESCRIPTION - PAIN TYPE: TYPE: ACUTE PAIN;SURGICAL PAIN

## 2025-05-21 ASSESSMENT — SOCIAL DETERMINANTS OF HEALTH (SDOH)
WITHIN THE LAST YEAR, HAVE TO BEEN RAPED OR FORCED TO HAVE ANY KIND OF SEXUAL ACTIVITY BY YOUR PARTNER OR EX-PARTNER?: NO
WITHIN THE LAST YEAR, HAVE YOU BEEN HUMILIATED OR EMOTIONALLY ABUSED IN OTHER WAYS BY YOUR PARTNER OR EX-PARTNER?: NO
WITHIN THE PAST 12 MONTHS, YOU WORRIED THAT YOUR FOOD WOULD RUN OUT BEFORE YOU GOT THE MONEY TO BUY MORE: NEVER TRUE
WITHIN THE PAST 12 MONTHS, THE FOOD YOU BOUGHT JUST DIDN'T LAST AND YOU DIDN'T HAVE MONEY TO GET MORE: NEVER TRUE
WITHIN THE LAST YEAR, HAVE YOU BEEN KICKED, HIT, SLAPPED, OR OTHERWISE PHYSICALLY HURT BY YOUR PARTNER OR EX-PARTNER?: NO
IN THE PAST 12 MONTHS, HAS THE ELECTRIC, GAS, OIL, OR WATER COMPANY THREATENED TO SHUT OFF SERVICE IN YOUR HOME?: NO
WITHIN THE LAST YEAR, HAVE YOU BEEN AFRAID OF YOUR PARTNER OR EX-PARTNER?: NO

## 2025-05-21 ASSESSMENT — LIFESTYLE VARIABLES
EVER FELT BAD OR GUILTY ABOUT YOUR DRINKING: NO
TOTAL SCORE: 0
DOES PATIENT WANT TO STOP DRINKING: NO
TOTAL SCORE: 0
TOTAL SCORE: 0
HAVE YOU EVER FELT YOU SHOULD CUT DOWN ON YOUR DRINKING: NO
CONSUMPTION TOTAL: NEGATIVE
HAVE PEOPLE ANNOYED YOU BY CRITICIZING YOUR DRINKING: NO
ON A TYPICAL DAY WHEN YOU DRINK ALCOHOL HOW MANY DRINKS DO YOU HAVE: 0
EVER HAD A DRINK FIRST THING IN THE MORNING TO STEADY YOUR NERVES TO GET RID OF A HANGOVER: NO
HOW MANY TIMES IN THE PAST YEAR HAVE YOU HAD 5 OR MORE DRINKS IN A DAY: 0
ALCOHOL_USE: NO
AVERAGE NUMBER OF DAYS PER WEEK YOU HAVE A DRINK CONTAINING ALCOHOL: 0

## 2025-05-21 ASSESSMENT — FIBROSIS 4 INDEX
FIB4 SCORE: 1.632
FIB4 SCORE: 1.632

## 2025-05-21 ASSESSMENT — PATIENT HEALTH QUESTIONNAIRE - PHQ9
SUM OF ALL RESPONSES TO PHQ9 QUESTIONS 1 AND 2: 0
2. FEELING DOWN, DEPRESSED, IRRITABLE, OR HOPELESS: NOT AT ALL
1. LITTLE INTEREST OR PLEASURE IN DOING THINGS: NOT AT ALL

## 2025-05-21 NOTE — OP REPORT
VASCULAR SURGERY SERVICE  Operative Note  _______________________________________________    Date: 2025    Patient: Darci Davis  : 1955  MRN: [id number]  _______________________________________________    Preoperative Diagnosis:  symptomatic left carotid stenosis    Postoperative Diagnosis:  Same    Procedure:  43370 left carotid endarterectomy with neuromonitoring  _______________________________________________    Surgeon:   Maico Rosas MD    Assistant:   Haydee ROBLEDO    Anesthesia:   GETA plus local anesthetic    EBL:    Less than 100 cc    Heparin:   Systemically heparinized    Complications:   None    Disposition:   PACU in stable condition    Findings:   Bulky coarse calcified plaque      Justification for use of Surgical First Assist:  An experienced first assistant was utilized during this operation due to the complexity of the operation.  My assistant participated with patient preparation for surgery, incision, surgical exposure including retraction, dissection, and ligation to isolate the target structures and preserve nearby structures, and closure of the field of dissection.  The presence of the expert assist increased the efficiency of the operation and decreased the risk of intraoperative surgical complications.    _______________________________________________    History:  Dacri Davis is a 70 y.o. male with symptomatic left carotid stenosis.  Carotid endarterectomy was recommended.  I had a thorough, detailed discussion with the patient regarding the severity of his carotid disease and that he is at high risk of stroke.  I explained the primary purpose of this operation is to prevent stroke from the plaque in the carotid artery.  I explained the details of the operation including neuromonitoring and possible use of a shunt.  I discussed the alternatives of optimal medical management or stenting, although carotid endarterectomy is preferable in someone who is  acceptable risk for surgery.  I discussed the potential risks, including but not limited to bleeding, infection, injury to vessels or nerves, and risks of anesthesia. I explained the risk that the operation itself can cause a stroke, although the risk of stroke from the operation is less than the risk of stroke if the plaque is not treated, and thus carotid endarterectomy is recommended.  All of their questions were answered. Patient understands and agrees to proceed.    Procedure Summary:  Following informed consent, patient was brought to the OR where general anesthesia was administered. Patient was positioned, neuromonitoring was put in place, and the neck was prepped and draped in the usual sterile fashion.  Timeout was called to identify the correct patient, team and equipment.  Everyone was in agreement.      Procedure began with injection of local anesthetic along the SCM and then a longitudinal incision was made and carried down through each layer using cautery to assure hemostasis.  The common facial vein was identified and ligated.  The common, external and internal carotid were identified and dissected circumferentially and encircled with loops.      Patient was systemically heparinized and then the artery was clamped, with the ICA clamp being applied first.  The carotid was opened with a longitudinal arteriotomy and a Bulky coarse calcified plaque was seen.  There were no changes on EEG monitoring. Endarterectomy of the plaque was performed and the endpoints feathered nicely.  Once complete, a bovine pericardial patch was sutured with a running 6-0 prolene suture.  The artery was flushed and copiously irrigated and the suture line was completed.  The clamps were removed, with the ICA being removed last, and flow was restored.  There was a continuous doppler flow signal in the ICA at this point with no evidence of stenosis.  The heparin was reversed with protamine. Topical hemostatic was applied. A 7Fr  drain was placed and exited the lower neck and was secured with a nylon suture and connected to bulb suction.    At this point we had good hemostasis.  The platysma was reapproximated with absorbable suture.  The skin was reapproximated with a running 4-0 stratafix subcuticular suture.  A sterile dressing was placed. Patient was extubated and sent to PACU in stable condition.  All counts were correct at the conclusion of the case.       _______________________________________________    Maico Rosas MD  [facility]

## 2025-05-21 NOTE — ANESTHESIA PROCEDURE NOTES
Airway    Date/Time: 5/21/2025 12:37 PM    Performed by: Delroy Zepeda M.D.  Authorized by: Delroy Zepeda M.D.    Location:  OR  Urgency:  Elective  Indications for Airway Management:  Anesthesia      Spontaneous Ventilation: absent    Sedation Level:  Deep  Preoxygenated: Yes    Patient Position:  Sniffing  Mask Difficulty Assessment:  2 - vent by mask + OA or adjuvant +/- NMBA  Final Airway Type:  Endotracheal airway  Final Endotracheal Airway:  ETT  Cuffed: Yes    Technique Used for Successful ETT Placement:  Direct laryngoscopy    Insertion Site:  Oral  Blade Type:  Byrnes  Laryngoscope Blade/Videolaryngoscope Blade Size:  2  ETT Size (mm):  7.0  Measured from:  Lips  ETT to Lips (cm):  24  Placement Verified by: auscultation and capnometry    Cormack-Lehane Classification:  Grade I - full view of glottis  Number of Attempts at Approach:  1

## 2025-05-21 NOTE — ANESTHESIA POSTPROCEDURE EVALUATION
Patient: Darci Davis    Procedure Summary       Date: 05/21/25 Room / Location: [place] [address] / SURGERY [facility]    Anesthesia Start: 1228 Anesthesia Stop: 1351    Procedure: LEFT CAROTID ENDARTERECTOMY WITH NEUROMONITORING (Left: Neck) Diagnosis: (LEFT CAROTID STENOSIS)    Surgeons: Maico Rosas M.D. Responsible Provider: Delroy Zepeda M.D.    Anesthesia Type: general ASA Status: 3            Final Anesthesia Type: general  Last vitals  BP   Blood Pressure : (!) 150/71    Temp   36.2 °C (97.2 °F)    Pulse   89   Resp   13    SpO2   93 %      Anesthesia Post Evaluation    Patient location during evaluation: PACU  Patient participation: complete - patient participated  Level of consciousness: awake and alert  Pain score: 2    Airway patency: patent  Anesthetic complications: no  Cardiovascular status: hemodynamically stable  Respiratory status: acceptable  Hydration status: euvolemic    PONV: none          No notable events documented.

## 2025-05-21 NOTE — PROGRESS NOTES
Pharmacy Medication Reconciliation      ~Medication reconciliation updated and complete per patient   ~Allergies have been verified and updated   ~No oral ABX within the last 30 days  ~Is dispense history available in EPIC: yes  ~Patient home pharmacy :  [facility] [phone]      ~Anticoagulants (rivaroxaban, apixaban, edoxaban, dabigatran, warfarin, enoxaparin) taken in the last 14 days? NO

## 2025-05-21 NOTE — OR NURSING
1346 Pt arrived to PACU with Anesthesiologist and OR RN. AAOx4. Even, unlabored respirations. VSS. No pain. No nausea. Left neck dressing CDI.    1424 POC update given to Carlton over the phone. All questions answered.     1646 Pt meets phase I criteria. Report called to ALTHEA Nielsen at 1700.     1745 Patients wife Carlton to bedside.    1916 Pt transported to room with transport tech. Chart and glasses with patient. VSS.

## 2025-05-21 NOTE — ANESTHESIA PREPROCEDURE EVALUATION
Case: [id number] Date/Time: 05/21/25 1140    Procedure: LEFT CAROTID ENDARTERECTOMY WITH NEUROMONITORING    Pre-op diagnosis: LEFT CAROTID STENOSIS    Location: [facility] OR 14 / SURGERY [facility]    Surgeons: Maico Rosas M.D.          BMI 41, severe left carotid stenosis.  Denies: MI/CHF/smoking/CVA/DM/CKD      Relevant Problems   CARDIAC   (positive) Hypertension         (positive) Hepatic steatosis   (positive) Renal cyst      ENDO   (positive) Diabetes mellitus, type II (HCC)       Physical Exam    Airway   Mallampati: II  TM distance: >3 FB  Neck ROM: full       Cardiovascular - normal exam  Rhythm: regular  Rate: normal    (-) murmur     Dental   Comments: EDENTULOUS           Pulmonary - normal examBreath sounds clear to auscultation     Abdominal    Neurological - normal exam                   Anesthesia Plan    ASA 3   ASA physical status 3 criteria: morbid obesity - BMI greater than or equal to 40    Plan - general       Airway plan will be ETT    (jon)      Induction: intravenous    Postoperative Plan: Postoperative administration of opioids is intended.    Pertinent diagnostic labs and testing reviewed    Informed Consent:    Anesthetic plan and risks discussed with patient.    Use of blood products discussed with: patient whom consented to blood products.

## 2025-05-21 NOTE — H&P
"Vascular Surgery            New Patient Consultation     Patient:Darci Davis  MRN:[id number]  Primary care physician:Phil Kohler M.D.  Referring Provider: Phil Kohler M.D.Ref Provider (PCP)      Vascular Consultant: Macio Rosas MD     Date: 5/21/25  _____________________________________________________     Chief Complaint:      Left carotid artery stenosis     History of Present Illness:   Darci Davis  is a 70 y.o. year old male who was diagnosed with severe stenosis of his left carotid artery.  Patient reports that he has macular degeneration and has had issues with his left eye and is required several injections.  He did report that he had a shade effect brought over his left eye during his last appointment.  He also developed some dizziness while out working in the yard.  All this led up to a CT scan of his neck which demonstrated a severe stenosis within the left carotid artery.  The patient has had no other focal neurologic deficits consistent with TIA or stroke.  See discussion below     Past Medical History:      Past Medical History        Past Medical History:   Diagnosis Date    Accessory skin tags 8/20/2018    Allergic rhinitis 5/4/2009    Anesthesia       \"shallow breathing when waking up\"    Degenerative arthritis of knee 5/4/2009    Dental disorder       upper and lower dentures, full    Dyslipidemia 5/4/2009    High cholesterol      Hypertension 5/4/2009    Morbid obesity (HCC) 5/4/2009    Plantar fasciitis 5/4/2009    Preventative health care 5/4/2009    S/P lateral meniscectomy of right knee 5/4/2009    Seborrheic keratoses 8/20/2018    Sleep apnea 5/4/2009 6/28/2016 pt denies sleep apnea         Past Surgical History:      Past Surgical History         Past Surgical History:   Procedure Laterality Date    KNEE ARTHROPLASTY TOTAL Left 7/11/2016     Procedure: KNEE ARTHROPLASTY TOTAL;  Surgeon: Madhav Iqbal M.D.;  Location: SURGERY [facility];  Service:     KNEE " ARTHROSCOPY Left 2010     left knee    KNEE ARTHROSCOPY Right 2008     right knee         Allergies:      Allergies         Allergies   Allergen Reactions    Ace Inhibitors      Latex Rash       .    Pcn [Penicillins] Rash       .pt states it was a reaction as a kid, taken recently and was okay with taking    Zocor [Simvastatin]           Medications:      Encounter Medications          Outpatient Encounter Medications as of 2025   Medication Sig Dispense Refill    tamsulosin (FLOMAX) 0.4 MG capsule TAKE 1 CAPSULE BY MOUTH EVERY DAY. INDICATIONS: URINE SYMPTOMS 100 Capsule 2    aspirin 81 MG EC tablet Take 1 Tablet by mouth every day. 100 Tablet 2    Semaglutide, 1 MG/DOSE, (OZEMPIC, 1 MG/DOSE,) 4 MG/3ML Solution Pen-injector Inject 1 mg under the skin every 7 days. 9 mL 3    valsartan (DIOVAN) 40 MG Tab Take 1 Tablet by mouth every day. 100 Tablet 1    atorvastatin (LIPITOR) 80 MG tablet Take 1 Tablet by mouth every evening. 100 Tablet 0    celecoxib (CELEBREX) 200 MG Cap Take 1 Capsule by mouth 1 time a day as needed for Moderate Pain (shoulder pain). 100 Capsule 1      No facility-administered encounter medications on file as of 2025.         Social History:      Social History               Socioeconomic History    Marital status:        Spouse name: Not on file    Number of children: Not on file    Years of education: Not on file    Highest education level: Some college, no degree   Occupational History       Employer: [employer]   Tobacco Use    Smoking status: Former       Current packs/day: 0.00       Average packs/day: 1 pack/day for 15.0 years (15.0 ttl pk-yrs)       Types: Cigarettes       Start date: 1981       Quit date: 1996       Years since quittin.3    Smokeless tobacco: Never    Tobacco comments:       10 pack year history   Substance and Sexual Activity    Alcohol use: No       Alcohol/week: 0.0 oz       Comment: none    Drug use: No    Sexual activity: Not on file    Other Topics Concern    Not on file   Social History Narrative    Not on file      Social Drivers of Health           Financial Resource Strain: Low Risk  (2024)     Overall Financial Resource Strain (CARDIA)      Difficulty of Paying Living Expenses: Not hard at all   Food Insecurity: No Food Insecurity (2024)     Hunger Vital Sign      Worried About Running Out of Food in the Last Year: Never true      Ran Out of Food in the Last Year: Never true   Transportation Needs: No Transportation Needs (2024)     PRAPARE - Transportation      Lack of Transportation (Medical): No      Lack of Transportation (Non-Medical): No   Physical Activity: Insufficiently Active (2024)     Exercise Vital Sign      Days of Exercise per Week: 4 days      Minutes of Exercise per Session: 20 min   Stress: No Stress Concern Present (2024)     [place] [employer] - Occupational Stress Questionnaire      Feeling of Stress : Not at all   Social Connections: Unknown (2024)     Social Connection and Isolation Panel [NHANES]      Frequency of Communication with Friends and Family: Once a week      Frequency of Social Gatherings with Friends and Family: Patient declined      Attends Anglican Services: Never      Active Member of Clubs or Organizations: No      Attends Club or Organization Meetings: Patient declined      Marital Status:    Intimate Partner Violence: Not on file   Housing Stability: Unknown (2024)     Housing Stability Vital Sign      Unable to Pay for Housing in the Last Year: No      Number of Times Moved in the Last Year: Not on file      Homeless in the Last Year: No         Tobacco Use History   Social History           Tobacco Use   Smoking Status Former    Current packs/day: 0.00    Average packs/day: 1 pack/day for 15.0 years (15.0 ttl pk-yrs)    Types: Cigarettes    Start date: 1981    Quit date: 1996    Years since quittin.3   Smokeless Tobacco  Never   Tobacco Comments     10 pack year history         Social History           Substance and Sexual Activity   Alcohol Use No    Alcohol/week: 0.0 oz     Comment: none      Social History          Substance and Sexual Activity   Drug Use No      Family History:      Family History         Family History   Problem Relation Age of Onset    Cancer Brother           lung cancer            Review of Systems:   Constitutional:            Reports -dizziness and visual disturbances as stated above     Denies Chest pain   Denies SOB   Denies abdominal pain   Denies focal neuro deficits       Exam:   There were no vitals taken for this visit.     Constitutional:          Alert, oriented, no acute distress  HEENT:                       Normocephalic and atraumatic, EOMI  Neck:                          Supple, no JVD,   Cardiovascular:         Regular rate and rhythm,   Pulmonary:                Good air entry bilaterally,    Abdominal:                Soft, non-tender, non-distended  Musculoskeletal:       No tenderness, no deformity  Neurological:             grossly intact, no focal deficits  Skin:                           Skin is warm and dry. No rash noted.  Vascular exam:         Upper extremities warm and adequately perfused, no edema                                      Lower extremities warm and adequately perfused, no edema                                              Imaging:   CTA neck 4/24/25     IMPRESSION:     1.  Severe atherosclerotic plaque with probable 70-90% stenosis of the left internal carotid artery.  2.  Possible 1 to 2 mm right anterior communicating artery aneurysm versus infundibulum.  3.  Aberrant right subclavian artery takeoff.     Assessment and Plan:   - Severe left carotid artery stenosis   Difficult to know if the visual disturbances and events consistent with amaurosis fugax or if this is a macular degeneration issue.     The presence of a greater than 70% stenosis in the left carotid  artery warrants endarterectomy.  I discussed the risk benefits alternatives and expectations.  We discussed the option of carotid stenting.  I have opted to recommend an endarterectomy as it has a lower risk of procedural related stroke and he is a good surgical candidate.     After full discussion he demonstrates a clear understanding of our discussion and agrees to proceed        _____________________________________________________  Maico Rosas MD  [facility]  [phone] [address], [place] [address]

## 2025-05-21 NOTE — ANESTHESIA PROCEDURE NOTES
Arterial Line    Performed by: Delroy Zepeda M.D.  Authorized by: Delroy Zepeda M.D.    Start Time:  5/21/2025 12:33 PM  End Time:  5/21/2025 12:35 PM  Localization: surface landmarks    Patient Location:  OR  Indication: continuous blood pressure monitoring        Catheter Size:  20 G  Seldinger Technique?: Yes    Laterality:  Left  Site:  Radial artery  Line Secured:  Antimicrobial disc, tape and transparent dressing  Events: patient tolerated procedure well with no complications

## 2025-05-21 NOTE — ANESTHESIA TIME REPORT
Anesthesia Start and Stop Event Times       Date Time Event    5/21/2025 1132 Ready for Procedure     1228 Anesthesia Start     1351 Anesthesia Stop          Responsible Staff  05/21/25      Name Role Begin End    Delroy Zepeda M.D. Anesth 1228 1351          Overtime Reason:  no overtime (within assigned shift)    Comments:

## 2025-05-22 ENCOUNTER — PHARMACY VISIT (OUTPATIENT)
Dept: PHARMACY | Facility: MEDICAL CENTER | Age: 70
End: 2025-05-22
Payer: COMMERCIAL

## 2025-05-22 VITALS
SYSTOLIC BLOOD PRESSURE: 155 MMHG | OXYGEN SATURATION: 95 % | BODY MASS INDEX: 42.17 KG/M2 | HEART RATE: 83 BPM | TEMPERATURE: 98.8 F | HEIGHT: 70 IN | DIASTOLIC BLOOD PRESSURE: 83 MMHG | RESPIRATION RATE: 16 BRPM | WEIGHT: 294.53 LBS

## 2025-05-22 PROCEDURE — A9270 NON-COVERED ITEM OR SERVICE: HCPCS | Performed by: NURSE PRACTITIONER

## 2025-05-22 PROCEDURE — 700102 HCHG RX REV CODE 250 W/ 637 OVERRIDE(OP): Performed by: NURSE PRACTITIONER

## 2025-05-22 RX ADMIN — OXYCODONE 5 MG: 5 TABLET ORAL at 09:06

## 2025-05-22 RX ADMIN — Medication 1 APPLICATOR: at 05:35

## 2025-05-22 RX ADMIN — TAMSULOSIN HYDROCHLORIDE 0.4 MG: 0.4 CAPSULE ORAL at 05:35

## 2025-05-22 RX ADMIN — VALSARTAN 40 MG: 80 TABLET ORAL at 05:35

## 2025-05-22 RX ADMIN — OXYCODONE 5 MG: 5 TABLET ORAL at 02:23

## 2025-05-22 ASSESSMENT — PAIN DESCRIPTION - PAIN TYPE
TYPE: ACUTE PAIN;SURGICAL PAIN
TYPE: ACUTE PAIN
TYPE: ACUTE PAIN
TYPE: ACUTE PAIN;SURGICAL PAIN

## 2025-05-22 NOTE — PROGRESS NOTES
Discharge orders received.  Patient arrived to the discharge lounge.  PIV removed by discharge RN. Meds to beds medications verified by discharge RN, tamper evident seal in place on oxycodone bottle, bag with medication given to patient.  Instructions given, medications reviewed and general discharge education provided to patient.  Follow up appointments discussed.  Patient verbalized understanding of dc instructions and prescriptions.  Patient signed discharge instructions.  Patient verbalized he had all belongings with him, Denied having any home medications locked in our inpatient pharmacy that  he needs back. Patient ambulated with steady gait from discharge lounge to private vehicle. Patient left via car with spouse to home in stable condition.

## 2025-05-22 NOTE — PROGRESS NOTES
Bedside report received.  Assessment complete.  A&O x 4. Patient calls appropriately.  Patient ambulates with standby assist. Bed alarm in.   Patient has 4/10 pain. Pain managed with prescribed medications.  Denies N&V. Tolerating regular diet.  Skin per flowsheets.    +void, - flatus, Last BM 05/21.  Patient denies SOB.  SCD's off, patient ambulating.    Patient is pleasant and cooperative.  Review plan with of care with patient. Call light and personal belongings within reach. Hourly rounding in place. All needs met at this time.

## 2025-05-22 NOTE — DIETARY
NUTRITION SERVICES: BMI - Pt with BMI >40 (=Body mass index is 42.26 kg/m².), class III obesity. Weight loss counseling not appropriate in acute care setting.     RECOMMEND - If appropriate at DC please refer to outpatient nutrition services for weight management.

## 2025-05-22 NOTE — DISCHARGE SUMMARY
DISCHARGE SUMMARY    Darci Davis  [id number]  [id number]  _____________________________________    DATE OF ADMISSION: 5/21/2025    DATE OF DISCHARGE:     ADMISSION DIAGNOSIS (ES):  Left carotid stenosis [I65.22]    DISCHARGE DIAGNOSIS (ES):  Left carotid stenosis [I65.22]    DISCHARGE CONDITION:  stable    CONSULTATIONS:  none    PROCEDURES:  5/21/2025  Left CEA    HOSPITAL COURSE:  Darci Davis is a 70 y.o. male .    Patient underwent the above-mentioned procedure without complication and was admitted to [facility] postoperatively. he had an uneventful recovery and was discharged intact on postoperative day #1.    MEDICATIONS:  Current Outpatient Medications   Medication Sig Dispense Refill    HYDROcodone-acetaminophen (NORCO) 5-325 MG Tab per tablet Take 1 Tablet by mouth every four hours as needed (post op pain) for up to 2 days. 10 Tablet 0       FOLLOW-UP:  Please call my office at [phone] to make an appointment in 2 week(s)    DISCHARGE INSTRUCTIONS:  Resume preadmission diet.  Light activity for 4 weeks.  OK to shower and get incisions wet.  Call or go to ER if you have chest pain, shortness of breath, lightheadedness, stroke-like symptoms, fever, worsening pain, or any other concerns.    Maico Rosas MD   [facility]

## 2025-05-22 NOTE — PROGRESS NOTES
Education completed regarding stroke. Stroke packet provided, all patient questions answered at this time. Patient verbalizes understanding regarding symptoms and necessity of care if any signs/symptoms appear.

## 2025-05-22 NOTE — PROGRESS NOTES
Patient  transferring to discharge lounge at this time. Discharge RN  to provide discharge education, follow-up information and remove PIV. Patient transporting via wheelchair. Belongings with patient. Meds to beds available for . Discharge RN updated.

## 2025-05-22 NOTE — CARE PLAN
The patient is Stable - Low risk of patient condition declining or worsening    Shift Goals  Clinical Goals: Monitor neurological status, provide medication PRN for pain, monitor hemodynamic stability  Patient Goals: rest, comfort    Progress made toward(s) clinical / shift goals:  vitals and neuro checks completed per policy, medication provided per MAR,     Patient is not progressing towards the following goals:

## 2025-05-23 ENCOUNTER — TELEPHONE (OUTPATIENT)
Dept: HEALTH INFORMATION MANAGEMENT | Facility: OTHER | Age: 70
End: 2025-05-23
Payer: MEDICARE

## 2025-05-23 ENCOUNTER — APPOINTMENT (OUTPATIENT)
Dept: PHYSICAL THERAPY | Facility: REHABILITATION | Age: 70
End: 2025-05-23
Attending: INTERNAL MEDICINE
Payer: MEDICARE

## 2025-05-28 ENCOUNTER — APPOINTMENT (OUTPATIENT)
Dept: PHYSICAL THERAPY | Facility: REHABILITATION | Age: 70
End: 2025-05-28
Attending: INTERNAL MEDICINE
Payer: MEDICARE

## 2025-05-28 ENCOUNTER — TELEPHONE (OUTPATIENT)
Facility: MEDICAL CENTER | Age: 70
End: 2025-05-28
Payer: MEDICARE

## 2025-05-28 ENCOUNTER — OFFICE VISIT (OUTPATIENT)
Dept: URGENT CARE | Facility: PHYSICIAN GROUP | Age: 70
End: 2025-05-28
Payer: MEDICARE

## 2025-05-28 VITALS
SYSTOLIC BLOOD PRESSURE: 158 MMHG | WEIGHT: 292 LBS | DIASTOLIC BLOOD PRESSURE: 85 MMHG | RESPIRATION RATE: 14 BRPM | OXYGEN SATURATION: 94 % | TEMPERATURE: 98.6 F | HEIGHT: 70 IN | HEART RATE: 98 BPM | BODY MASS INDEX: 41.8 KG/M2

## 2025-05-28 DIAGNOSIS — I10 PRIMARY HYPERTENSION: ICD-10-CM

## 2025-05-28 DIAGNOSIS — R21 RASH AND OTHER NONSPECIFIC SKIN ERUPTION: ICD-10-CM

## 2025-05-28 DIAGNOSIS — Z98.890 HISTORY OF LEFT-SIDED CAROTID ENDARTERECTOMY: ICD-10-CM

## 2025-05-28 DIAGNOSIS — Z01.30 BLOOD PRESSURE CHECK: ICD-10-CM

## 2025-05-28 RX ORDER — CLINDAMYCIN HYDROCHLORIDE 300 MG/1
300 CAPSULE ORAL 3 TIMES DAILY
Qty: 15 CAPSULE | Refills: 0 | Status: SHIPPED | OUTPATIENT
Start: 2025-05-28 | End: 2025-05-30

## 2025-05-28 RX ORDER — VALSARTAN AND HYDROCHLOROTHIAZIDE 160; 25 MG/1; MG/1
0.5 TABLET ORAL DAILY
Qty: 15 TABLET | Refills: 0 | Status: SHIPPED | OUTPATIENT
Start: 2025-05-28 | End: 2025-05-30

## 2025-05-28 RX ORDER — VALSARTAN AND HYDROCHLOROTHIAZIDE 160; 25 MG/1; MG/1
0.5 TABLET ORAL DAILY
Qty: 15 TABLET | Refills: 0 | Status: SHIPPED | OUTPATIENT
Start: 2025-05-28 | End: 2025-05-28

## 2025-05-28 RX ORDER — TRIAMCINOLONE ACETONIDE 0.25 MG/G
1 CREAM TOPICAL 2 TIMES DAILY
Qty: 80 G | Refills: 0 | Status: SHIPPED | OUTPATIENT
Start: 2025-05-28 | End: 2025-05-30

## 2025-05-28 ASSESSMENT — FIBROSIS 4 INDEX: FIB4 SCORE: 1.632

## 2025-05-28 ASSESSMENT — ENCOUNTER SYMPTOMS
WHEEZING: 0
SHORTNESS OF BREATH: 0
COUGH: 0
VOMITING: 0
ABDOMINAL PAIN: 0
NECK PAIN: 0
HEADACHES: 0
DIARRHEA: 0
CHILLS: 0
CONSTIPATION: 0
FEVER: 0
NAUSEA: 0
PALPITATIONS: 0

## 2025-05-28 NOTE — TELEPHONE ENCOUNTER
Patient called vascular surgery office regarding his current high BP, I sent message to Dr. Tipton on call Vascular Surgeon. Dr. Tipton requested patient to go to Urgent Care. I let patient know and he understood.

## 2025-05-28 NOTE — PROGRESS NOTES
"Subjective     Darci Davis is a 70 y.o. male who presents with Hypertension (Readings at home has been high ) and Rash (Chest area, itchy, x4d )            Darci is a 70 y.o. male who presents to urgent care with concerns for elevated blood pressure.  Patient states he has been taking a BP log at home and noticed over the last couple days his BP has been elevated.  Patient does have a history of hypertension.  States his BP medication was recently changed a few months ago.  He also brings up concerns of a rash on his chest which is itchy in characteristic.  Recently had left carotid endarterectomy and rash developed shortly after.  States rash is red and itchy.  No pain.  No increased warmth.  No fever.  No neck stiffness.        Review of Systems   Constitutional:  Negative for chills and fever.   Respiratory:  Negative for cough, shortness of breath and wheezing.    Cardiovascular:  Negative for chest pain and palpitations.   Gastrointestinal:  Negative for abdominal pain, constipation, diarrhea, nausea and vomiting.   Musculoskeletal:  Negative for neck pain.   Skin:  Positive for itching and rash.   Neurological:  Negative for headaches.   All other systems reviewed and are negative.             Objective     /68 (BP Location: Left arm, Patient Position: Sitting, BP Cuff Size: Adult long)   Pulse 98   Temp 37 °C (98.6 °F) (Temporal)   Resp 14   Ht 1.778 m (5' 10\")   Wt (!) 132 kg (292 lb)   SpO2 94%   BMI 41.90 kg/m²      Physical Exam  Vitals reviewed.   Constitutional:       General: He is not in acute distress.     Appearance: Normal appearance. He is not ill-appearing, toxic-appearing or diaphoretic.   HENT:      Head: Normocephalic and atraumatic.      Nose: Nose normal.      Mouth/Throat:      Mouth: Mucous membranes are moist.      Pharynx: Oropharynx is clear.   Eyes:      Extraocular Movements: Extraocular movements intact.      Conjunctiva/sclera: Conjunctivae normal.      " Pupils: Pupils are equal, round, and reactive to light.   Cardiovascular:      Rate and Rhythm: Normal rate.   Pulmonary:      Effort: Pulmonary effort is normal.      Breath sounds: Normal breath sounds.   Musculoskeletal:      Cervical back: Normal range of motion. No rigidity or crepitus. No pain with movement. Normal range of motion.   Lymphadenopathy:      Cervical: No cervical adenopathy.   Skin:     General: Skin is warm and dry.             Comments: Surgical incision of left neck with sutures in place. Some swelling to upper portion of surgical incision. Faint erythema with some increased warmth to area. No fluctuance. Erythematous maculopapular rash of chest and upper back. See clinical pictures below.   Neurological:      General: No focal deficit present.      Mental Status: He is alert and oriented to person, place, and time.                                              Assessment & Plan  Blood pressure check  - See BP log copied below.  - BP elevated in clinic at 158/85.  - He has follow up with PCP on Friday.         Primary hypertension  - Chronic, Treated/managed by PCP.  - STOP Diovan 40mg, will re-start patient on DIOVAN-HCT. States he was previously stable on this medication for 10 years. Recently changed to DIOVAN. Will start back on DIOVAN-HCT 0.5 tablet daily. Follow up with PCP as scheduled on Friday.  - Advised on importance of medication adherence, BP monitoring/BP log and follow up with PCP. ER precautions discussed.        Orders:    valsartan-hydrochlorothiazide (DIOVAN-HCT) 160-25 MG per tablet; Take 0.5 Tablets by mouth every day for 30 days.    History of left-sided carotid endarterectomy  - S/P left carotid endarterectomy on 5/21/2025. Faint erythema around surgical site with some increased warmth. Will start patient on clindamycin due to penicillin allergy.  ER precautions discussed.    Orders:    clindamycin (CLEOCIN) 300 MG Cap; Take 1 Capsule by mouth 3 times a day for 5  days.    Rash and other nonspecific skin eruption    Orders:    clindamycin (CLEOCIN) 300 MG Cap; Take 1 Capsule by mouth 3 times a day for 5 days.    triamcinolone acetonide (KENALOG) 0.025 % Cream; Apply 1 Application topically 2 times a day for 7 days.                     I personally reviewed prior external notes and test results pertinent to today's visit.    Differential diagnoses, supportive care measures and indications for immediate follow-up discussed with patient. Pathogenesis of diagnosis discussed including typical length and natural progression.      Instructed to return to urgent care or nearest emergency department if symptoms fail to improve, for any change in condition, further concerns, or new concerning symptoms.    Patient states understanding and agrees with the plan of care and discharge instructions.                 My total time spent caring for the patient on the day of the encounter was 40 minutes including reviewing prior external notes/test results pertinent to todays visit, obtaining patient hx, physical exam, discussing plan of care, supportive care, appropriate follow up and indications for immediate follow up. This does not include time spent on separately billable procedures/tests.

## 2025-05-28 NOTE — ASSESSMENT & PLAN NOTE
- Chronic, Treated/managed by PCP.  - STOP Diovan 40mg, will re-start patient on DIOVAN-HCT. States he was previously stable on this medication for 10 years. Recently changed to DIOVAN. Will start back on DIOVAN-HCT 0.5 tablet daily. Follow up with PCP as scheduled on Friday.  - Advised on importance of medication adherence, BP monitoring/BP log and follow up with PCP. ER precautions discussed.        Orders:    valsartan-hydrochlorothiazide (DIOVAN-HCT) 160-25 MG per tablet; Take 0.5 Tablets by mouth every day for 30 days.

## 2025-05-30 ENCOUNTER — TELEPHONE (OUTPATIENT)
Dept: NEUROLOGY | Facility: MEDICAL CENTER | Age: 70
End: 2025-05-30

## 2025-05-30 ENCOUNTER — APPOINTMENT (OUTPATIENT)
Dept: PHYSICAL THERAPY | Facility: REHABILITATION | Age: 70
End: 2025-05-30
Attending: INTERNAL MEDICINE
Payer: MEDICARE

## 2025-05-30 ENCOUNTER — OFFICE VISIT (OUTPATIENT)
Dept: MEDICAL GROUP | Facility: MEDICAL CENTER | Age: 70
End: 2025-05-30
Payer: MEDICARE

## 2025-05-30 VITALS
HEIGHT: 68 IN | DIASTOLIC BLOOD PRESSURE: 72 MMHG | HEART RATE: 84 BPM | OXYGEN SATURATION: 95 % | BODY MASS INDEX: 47.74 KG/M2 | TEMPERATURE: 98.5 F | WEIGHT: 315 LBS | SYSTOLIC BLOOD PRESSURE: 138 MMHG

## 2025-05-30 DIAGNOSIS — I10 PRIMARY HYPERTENSION: Chronic | ICD-10-CM

## 2025-05-30 DIAGNOSIS — R73.09 IMPAIRED GLUCOSE METABOLISM: ICD-10-CM

## 2025-05-30 DIAGNOSIS — E66.01 MORBID OBESITY (HCC): Chronic | ICD-10-CM

## 2025-05-30 DIAGNOSIS — E11.9 DIABETES MELLITUS WITHOUT COMPLICATION (HCC): ICD-10-CM

## 2025-05-30 DIAGNOSIS — I70.0 AORTIC ATHEROSCLEROSIS (HCC): ICD-10-CM

## 2025-05-30 DIAGNOSIS — E78.5 DYSLIPIDEMIA: Primary | Chronic | ICD-10-CM

## 2025-05-30 DIAGNOSIS — R31.29 MICROSCOPIC HEMATURIA: ICD-10-CM

## 2025-05-30 PROBLEM — Z98.890 S/P CAROTID ENDARTERECTOMY: Status: ACTIVE | Noted: 2025-05-30

## 2025-05-30 PROBLEM — I65.29 CAROTID STENOSIS: Status: ACTIVE | Noted: 2025-05-30

## 2025-05-30 RX ORDER — HYDROCHLOROTHIAZIDE 12.5 MG/1
12.5 CAPSULE ORAL DAILY
Qty: 100 CAPSULE | Refills: 1 | Status: SHIPPED | OUTPATIENT
Start: 2025-05-30

## 2025-05-30 RX ORDER — CLOBETASOL PROPIONATE 0.5 MG/G
1 CREAM TOPICAL 2 TIMES DAILY PRN
Qty: 60 G | Refills: 1 | Status: SHIPPED | OUTPATIENT
Start: 2025-05-30

## 2025-05-30 RX ORDER — TIRZEPATIDE 2.5 MG/.5ML
2.5 INJECTION, SOLUTION SUBCUTANEOUS
Qty: 2 ML | Refills: 2 | Status: SHIPPED | OUTPATIENT
Start: 2025-05-30 | End: 2025-06-11

## 2025-05-30 RX ORDER — VALSARTAN 40 MG/1
80 TABLET ORAL DAILY
Qty: 200 TABLET | Refills: 1 | Status: SHIPPED | OUTPATIENT
Start: 2025-05-30 | End: 2026-07-04

## 2025-05-30 ASSESSMENT — FIBROSIS 4 INDEX: FIB4 SCORE: 1.632

## 2025-05-30 NOTE — PROGRESS NOTES
Subjective:     Darci Davis is a 70 y.o. male who presents for Hospital Follow-up.    HPI:   Recently hospitalized for left carotid endarterectomy  Patient is here with his wife. [facility] Follow-up, Blood Pressure Problem (Urgent care changed his meds yesterday //Wants to get his meds evened out ), and Rash (neck)  Recent records reviewed  3/31/25 question amaurosis fugax left eye in january, saw  ophthalmology will obtain old records, echo, CT angiogram carotid  4/9/25 echo normal LV size, mild LVH, normal systolic function EF 63%, normal wall motion, grade 1 diastolic dysfunction, no evidence of right-to-left shunt on saline study, ascending aorta 3.8 cm  4/24/25 CT head and CTA neck with and without postprocessing, LICA 70 to 90% stenosis,GÓMEZ<50% stenosis, possible 1 to 2 mm right anterior communicating artery aneurysm versus infundibula, aberrant right subclavian takeoff, aortic atherosclerosis    5/6/25  vascular surgery consultation note severe left carotid stenosis, proceed with carotid endarterectomy  5/21/25  vascular surgery operative note left carotid endarterectomy  Prior to last admission, patient had been on valsartan 40 mg daily, previously had been on valsartan /25 mg going back to January, but since starting Ozempic he has lost weight, seen urgent care 5/28/25 for rash over his neck area that started oral lesions noticed this past Saturday when he took his postoperative bandage and tape off the anterior and posterior neck area, itching type rash, no fever, no history of cellulitis, patient was given Kenalog 0.025% cream and clindamycin, 5 days of clindamycin no side effects of diarrhea.  Patient uses Dial soap, has been careful not to scrub his carotid endarterectomy site.  Still notices that the endarterectomy site left side is a bit swollen and is increased in size compared to left but no pain, no difficulty  with speech or swallowing.  Has seen vascular surgery in follow-up.  Blood pressure was elevated also at urgent care 158/85,       Current medicines (including reconciliation performed today)  Current Medications[1]    Allergies:   Latex, Pcn [penicillins], and Zocor [simvastatin]    Social History[2]             Seen urgent care 5/28/25 blood pressure running higher since May 23 140s to 180s over  on Diovan    Uses dial soap and pantene shanpoo       This past saturday took bandages and tape off the      Current Medications[3]        umbilical hernia  3/10/25  [place] surgical note symptomatic primary umbilical hernia, recommended robotic assisted laparoscopic repair with peritoneal placement of mesh, BMI 41, recommend BMI 38 or less or 25 pound weight loss to reduce risk of recurrence and operative complication, will allow 3 months to reach ideal body weight     Status post knee left replacement  6/08  right meniscus repair  5/11 MRI left knee medial meniscus tear, moderate chondral thinning, chondromalacia  6/11  ortho left knee medial and lateral meniscectomy  7/12  left knee injection  12/12 now sees dr.mendez ortho  1/13  ortho note, injection left knee  5/21/13  ortho note, injection left knee  3/31/15  ortho note; left knee injection steroid  5/29/15  ortho note knee arthritis,second injection euflexxa left knee  7/11/16  orthopedic operative note, left total knee arthroplasty  1/31/17  orthopedic note 6 months status post left total knee, x-ray left knee 3 view good component position and alignment, follow-up 6 months repeat x-rays  8/27/19  orthopedic note injury right knee, x-ray loss of medial joint space findings consistent with lateral meniscus tear, obtain MRI follow-up after imaging completed    s/p carotid left arterectomy  3/31/25 question amaurosis fugax left eye in january, saw   ophthalmology will obtain old records, echo, CT angiogram carotid  4/9/25 echo normal LV size, mild LVH, normal systolic function EF 63%, normal wall motion, grade 1 diastolic dysfunction, no evidence of right-to-left shunt on saline study, ascending aorta 3.8 cm  4/24/25 CT head and CTA neck with and without postprocessing, LICA 70 to 90% stenosis,GÓMEZ<50% stenosis, possible 1 to 2 mm right anterior communicating artery aneurysm versus infundibula, aberrant right subclavian takeoff, aortic atherosclerosis    5/6/25  vascular surgery consultation note severe left carotid stenosis, proceed with carotid endarterectomy  5/21/25  vascular surgery operative note left carotid endarterectomy     shoulder right pain  8/5/24 xray right shoulder arthritis   11/23/24 referral physical therapy  1/30/25 referral to [facility], increase celebrex from 100 mg qday to 200 mg qday     Renal cyst  12/9/13 ultrasound abdomen left renal 18 mm hypoechoic mass most likely cysts, MRI or CT to clarify  12/13/13 CT abd/pelvis with and without; 8 mm simple appearing cyst in the lower pole the left kidney which is discordant in size with the recent ultrasound. Followup ultrasound surveillance or MRI examination may be of value to further study the left kidney,11 mm in diameter simple cyst in the midpole region of the right kidney  11/17/14 ultrasound renal kidneys are poorly visualized due to poor sonographic penetration of the current exam,no focal renal cyst or mass is identified in the left kidney as seen on prior imaging studies at this time     Preventative health  10/9/14 zoster vaccine  5/28/15 tdap  6/5/19 prevnar  7/2/20 pneumovax   3/7/22 declines colonoscopy, cologuard ordered  10/17/24 flu   10/29/24 vit d 30  3/21/25 psa 4.0     obesity  7/2/13 declines bariatric eval  7/12/13 apnea link AHI 2, desaturation <89% for 27 min, total sleep time 6 hours 34 min, negative   12/2/13  referral bariatric  surgery  10/9/14 BMI 44.3; referral to  weight loss bariatric  5/28/15 BMI 46.3 has seen  and declines bariatric surgery, trial orlistat  12/29/15 BMI 46.3   2/4/16 patient called, insurance does not cover bariatric surgery  6/16/16 BMI 45.2  9/16/16 BMI 42.8   3/6/17 BMI 44.3 insurance not cover bariatric, declines nutrition consultation; 20-30 minutes bike per day  12/26/17 BMI 45.3  7/2/20 BMI 45.89 referral weight management   1/19/21 BMI 43.0  3/7/22 weight 314 lb started ozempic   8/2/22 BMI 42.1 on semaglutide 0.5 mg weekly   1/16/23 has restarted ozempic 1 mg  5/29/24 BMI 42.12  [facility] start semaglutide 0.25 mg weekly  10/17/24 BMI 41 on semaglutide 0.5 mg, increase to 1 mg  1/30/25 BMI 41 on ozempic 1 mg,increase to 2 mg  3/31/25 BMI 41.42 off ozempic 1 mg for 2 weeks nausea     neck pain  2/11/16 spine [place] note evaluation cervical pain, x-rays cervical spine from february 2016 showed lordosis C4-C6 and DJD, slight C4-C5 spondylolisthesis, will obtain cervical flexion and extension x-rays, continue naprosyn and robaxin as needed, start physical therapy, follow-up 6 weeks if no improvement consider MRI     microscopic hematuria  6/28/16 UA 2-5 RBC  7/5/16 UA negative blood  4/6/17 UA negative blood  6/7/19 UA negative blood  7/16/20 UA negative blood  3/16/22 UA negative blood  6/2/23 UA negative blood  10/29/24 UA negative blood  3/21/25 UA 6-10 RBC     macular degeneration   5/17/21 dr.shieh cortés retina note 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, follow-up 3 weeks  8/25/22  retina note neovascular amd   1/16/23  eye exam neovascular amd   5/9/23  retina note follow up 6 months  1/2/24  [facility] eye exam macular degeneration   3/6/25 [facility] exam dry amd      low back pain  12/12 x-ray hip bilateral mild DJD  12/12 x-ray lumbar spine multilevel DJD and arthropathy, mild to moderate  dextroconvex scoliosis     leukocytosis  9/23/08 wbc 12.0 (74%N,19.6%L)  5/15/09 wbc 9.1  3/11/11 wbc 10.9  12/10/12 wbc 9.1  10/10/14 wbc 10.8  1/4/16 wbc 10.3  6/28/16 wbc 13.1  7/12/16 wbc 15.1  4/6/17 wbc 9.6  12/27/17 wbc 10  6/7/19 wbc 11.6  7/16/20 wbc 9.3  3/16/22 wbc 10.9  6/2/23 wbc 7.1  5/25/24 wbc 9.5  10/29/24 wbc 12.3 (65.8%N,20.5%L)  3/21/25 wbc 12.3 (69%N,19%L), flow cytometry no abnormal myeloid, B-cell, T-cell, or NK population cells  5/16/25 wbc 12.4     knee pain right  8/27/19  orthopedic note injury right knee, x-ray loss of medial joint space findings consistent with lateral meniscus tear, obtain MRI follow-up after imaging completed  10/1/19  orthopedic evaluation right knee pain, MRI right knee shows medial and patellofemoral degenerative joint changes options discussed conservative treatment versus right arthroscopy, patient elects to proceed with arthroscopy right knee  11/13/24 on celebrex as needed pain       hypogonadism  10/10/14 testosterone 201,free testosterone 49  1/6/16 testosterone 150,free testosterone 28  2/12/16 testosterone 179,prolactin 7,FSH 2.5,LH 1.0,SHBG 37  2/16/16 will try androgel 20.25 mg apply two per day  6/16/16 off androgel     Hypertension  9/08 urine mac 9, intolerant ace cough  Was on diovan hct 320/12.5 caused lightheadedness, taking half pill a day  10/14/11 on diovan 320/12.5 mg  11/3/11 change to diovan 320 mg, combo dose caused fatigue  12/6/12 change to diovan 160/25 mg daily  12/12 urine mac 4 on diovan hct 160/25 mg  12/2/13 urine mac <0.5 on diovan hct 160/25 mg  10/10/14 urine mac 6 on diovan 160/25 mg  6/3/15 urine mac <0.5 on diovan 160/25 mg  1/6/16 urine mac <0.5 on diovan hct 160/25 mg  4/6/17 urine mac <0.7 on diovan hct 160/25 mg  5/16/18 ultrasound carotid negative   7/16/20 urine mac<1.2 on diovan hct 160/25 mg   2/18/21 urine mac<1.2  on diovan hct 160/25 mg   3/16/22 urine mac<1.2 on diovan hct 160/25 mg   6/3/23 urine  mac<1.2 on diovan hct 160/25 mg  5/28/24 bun 20,creat 0.88,GFR 92 on diovan hct 160-25 mg per  [facility]  10/29/24 urine mac<1.2 on diovan hct 160/25 mg  1/30/25 change diovan hct 160/25 mg to diovan 40 mg lower blood pressures with weight loss  3/21/25 urine mac 9 on diovan 40 mg  4/9/25 echo normal LV size, mild LVH, normal systolic function EF 63%, normal wall motion, grade 1 diastolic dysfunction, no evidence of right-to-left shunt on saline study, ascending aorta 3.8 cm     Hepatic steatosis  12/9/13 AST 19,ALT 15  12/9/13 ultrasound abdomen echogenic inhomogenous liver nonspecific finding often found to represent steatosis  10/10/14 AST 17,ALT 15  6/3/15 AST 17,ALT 18  1/4/16 AST 14,ALT 19  2/12/16 AST 27,ALT 30  6/21/16 AST 14,ALT 16  4/6/17 AST 16,ALT 14  10/29/24 AST 11,ALT 9  10/29/24 LITTLE fibrosure; fibrosis score 0.22 (F0-F1), steatosis score 0.3 (S0), LITTLE score 0.0 (N0),AST 11,ALT 9,GGT 20  3/21/25 AST 16,ALT 11     Dyslipidemia  9/08 chol 213, trig 119, hdl 49, ldl 140  5/09 chol 171,trig 118,hdl 51,ldl 96  9/10 hold pravachol  3/11 chol 247,trig 143,hdl 51,ldl 167   3/11 start lipitor samples  5/11 need to try crestor per insurance, trial of crestor 10 mg  10/14/11 on lipitor 20 mg insurance cleared  10/24/11 chol 203,trig 102,hdl 52,ldl 131 off med; start lipitor 20 mg  12/12 chol 167,trig 114,hdl 53,ldl 91 on lipitor 20 mg  12/2/13 chol 199,trig 167,hdl 52,ldl 114 on lipitor 20 mg  10/10/14 chol 198,trig 140,hdl 55,ldl 115 on lipitor 20 mg  6/3/15 chol 162,trig 159,hdl 50,ldl 80 on lipitor 20 mg  12/29/15 off lipitor  1/6/16 chol 233,trig 147,hdl 52,ldl 142 off lipitor  1/7/16 start pravachol 20 mg  2/12/16 chol 190,trig 134,hdl 46,ldl 117 on pravachol 20 mg  6/21/16 chol 184,trig 131,hdl 54,ldl 104 on pravachol 20 mg  4/6/17 chol 190,trig 147,hdl 51,ldl 110 on pravachol 20 mg  12/27/17 chol 215,trig 201,hdl 53,ldl 122 on pravachol 20 mg  6/7/19 chol 200,trig 195,hdl 50,ldl 111 on  pravachol 20 mg  7/16/20 chol 203,trig 115,hdl 55,ldl 125 on pravachol 20 mg  7/17/20 change to crestor 10 mg  1/19/21 back on pravachol 20 mg (intolerant to crestor)  2/18/21 chol 211,trig 175,hdl 47,ldl 129 on pravastatin 20 mg  2/23/21 change to lipitor 20 mg and stop pravastatin  3/16/22 chol 189,trig 193,hdl 49,ldl 101 on lipitor 20 mg  6/3/23 chol 246,trig 140,hdl 51,ldl 167 on lipitor 20 mg  5/25/24 chol 182,trig 118,hdl 51,ldl 107 on lipitor 20 mg  10/29/24 chol 175,trig 108,hdl 54,ldl 99 on lipitor 20 mg  3/21/25 chol 170,trig 178,hdl 46,ldl 88,lipoprotein a 118 on lipitor 20 mg  3/31/25 increase lipitor to 80 mg  4/30/25 chol 152,treig 141,hdl 43,ldl 81,apo b 85 on lipitor 80 mg for only one week will repeat end of june  (intolerant crestor, pravastatin not as effective)     diabetes  10/10/14 bs 114  6/3/15 A1c 5.9%,bs 94  1/6/16 bs 104,A1c 6.1%  6/21/16 A1c 5.8%  4/6/17 bs 104  12/27/17 A1c 5.8%  6/7/19 A1c 6%  7/16/20 A1c 6.4%  7/17/20 start metformin  mg, order for freestyle lite meter and strips to pharmacy, patient will bring here for instruction  1/19/21 off metformin   2/18/21 A1c 6.4% no meds  5/17/21   retina note 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, follow-up 3 weeks  3/16/22 A1c 6.1%  3/7/22 started ozempic 0.25 mg weekly  4/19/22 increase to ozempic 0.5 mg weekly  1/2/24  [facility] eye exam macular degeneration   5/28/24 A1c 6.1% on ozempic 0.5 mg per  [facility]   10/17/24 increase ozempic to 1 mg weekly  10/29/24 A1c 5.7% on ozempic 1 mg weekly  1/30/25 increase ozempic to 2 mg   2/9/25 patient prefers to stay on the ozempic 1 mg dose  3/21/25 A1c 5.8% on ozempic 1 mg  4/1/25 off ozempic 1 mg x 2 weeks nausea, will restart  Intolerant metformin      BPH  5/28/15 start Flomax  12/29/15 start cialis 5 mg daily   4/6/17 psa 2.8  6/7/19 psa 2.1  7/2/20 could not tolerate flomax, trial doxazosin 2 mg  7/16/20 psa 2.3  3/7/22 off  "doxasozin   3/16/22 psa 2.9  6/3/23 psa 3.7  5/29/24  [facility] [place] resume flomax 0.4 mg  10/29/24 psa 4.5 on flomax   3/21/25 psa 4.0 on flomax    Aortic atherosclerosis  4/24/25 CT head and CTA neck with and without postprocessing, aortic atherosclerosis       abn psa  4/6/17 psa 2.85  6/7/19 psa 2.16  7/16/20 psa 239  3/16/22 psa 2.96  6/2/23 psa 3.73    Problem List[4]      ROS           Objective     /72   Pulse 84   Temp 36.9 °C (98.5 °F) (Temporal)   Ht 1.735 m (5' 8.31\")   Wt (!) 289 kg (637 lb 2.1 oz)   SpO2 95%   BMI 96.01 kg/m²      Physical Exam  Vitals and nursing note reviewed.   Constitutional:       General: He is not in acute distress.     Appearance: Normal appearance. He is not ill-appearing.   HENT:      Head: Normocephalic and atraumatic.      Right Ear: External ear normal.      Left Ear: External ear normal.      Nose: Nose normal.   Eyes:      Conjunctiva/sclera: Conjunctivae normal.   Cardiovascular:      Rate and Rhythm: Normal rate and regular rhythm.   Pulmonary:      Effort: Pulmonary effort is normal.      Breath sounds: Normal breath sounds.   Abdominal:      General: There is no distension.   Musculoskeletal:         General: No swelling.      Cervical back: Neck supple.   Skin:     Coloration: Skin is not jaundiced.      Findings: No bruising.   Neurological:      General: No focal deficit present.      Mental Status: He is alert.   Psychiatric:         Mood and Affect: Mood normal.         Behavior: Behavior normal.             Assessment & Plan       Assessment    Weight 289 pounds    Hctz 12.5 mg   Valsartan 80 mg   Mounjaro   Clobetasol           - Chart and discharge summary were reviewed.   - Hospitalization and results reviewed with patient.   - Medications reviewed including instructions regarding high risk medications, dosing and side effects.  - Recommended Services: No services needed at this time  - Advance directive/POLST on file?  Yes             "     [1]  Current Outpatient Medications   Medication Sig Dispense Refill   • clindamycin (CLEOCIN) 300 MG Cap Take 1 Capsule by mouth 3 times a day for 5 days. 15 Capsule 0   • triamcinolone acetonide (KENALOG) 0.025 % Cream Apply 1 Application topically 2 times a day for 7 days. 80 g 0   • acetaminophen (TYLENOL) 500 MG Tab Take 1,000 mg by mouth every 6 hours as needed for Mild Pain.     • Multiple Vitamins-Minerals (PRESERVISION AREDS PO) Take 1 Tablet by mouth 2 times a day.     • Avacincaptad Pegol (IZERVAY) 2 MG/0.1ML Solution Administer 2 mg into affected eye(s) see administration instructions. Injection Every Two Months     • tamsulosin (FLOMAX) 0.4 MG capsule TAKE 1 CAPSULE BY MOUTH EVERY DAY. INDICATIONS: URINE SYMPTOMS 100 Capsule 2   • aspirin 81 MG EC tablet Take 1 Tablet by mouth every day. 100 Tablet 2   • Semaglutide, 1 MG/DOSE, (OZEMPIC, 1 MG/DOSE,) 4 MG/3ML Solution Pen-injector Inject 1 mg under the skin every 7 days. 9 mL 3   • atorvastatin (LIPITOR) 80 MG tablet Take 1 Tablet by mouth every evening. 100 Tablet 0   • celecoxib (CELEBREX) 200 MG Cap Take 1 Capsule by mouth 1 time a day as needed for Moderate Pain (shoulder pain). 100 Capsule 1     No current facility-administered medications for this visit.   [2]  Social History  Tobacco Use   • Smoking status: Former     Current packs/day: 0.00     Average packs/day: 1 pack/day for 15.0 years (15.0 ttl pk-yrs)     Types: Cigarettes     Start date: 1981     Quit date: 1996     Years since quittin.4     Passive exposure: Past   • Smokeless tobacco: Never   • Tobacco comments:     10 pack year history   Vaping Use   • Vaping status: Never Used   Substance Use Topics   • Alcohol use: No     Comment: none   • Drug use: No   [3]  Current Outpatient Medications   Medication Sig Dispense Refill   • clindamycin (CLEOCIN) 300 MG Cap Take 1 Capsule by mouth 3 times a day for 5 days. 15 Capsule 0   • triamcinolone acetonide (KENALOG) 0.025 %  Cream Apply 1 Application topically 2 times a day for 7 days. 80 g 0   • valsartan-hydrochlorothiazide (DIOVAN-HCT) 160-25 MG per tablet Take 0.5 Tablets by mouth every day for 30 days. 15 Tablet 0   • acetaminophen (TYLENOL) 500 MG Tab Take 1,000 mg by mouth every 6 hours as needed for Mild Pain.     • Multiple Vitamins-Minerals (PRESERVISION AREDS PO) Take 1 Tablet by mouth 2 times a day.     • Avacincaptad Pegol (IZERVAY) 2 MG/0.1ML Solution Administer 2 mg into affected eye(s) see administration instructions. Injection Every Two Months     • tamsulosin (FLOMAX) 0.4 MG capsule TAKE 1 CAPSULE BY MOUTH EVERY DAY. INDICATIONS: URINE SYMPTOMS 100 Capsule 2   • aspirin 81 MG EC tablet Take 1 Tablet by mouth every day. 100 Tablet 2   • Semaglutide, 1 MG/DOSE, (OZEMPIC, 1 MG/DOSE,) 4 MG/3ML Solution Pen-injector Inject 1 mg under the skin every 7 days. 9 mL 3   • atorvastatin (LIPITOR) 80 MG tablet Take 1 Tablet by mouth every evening. 100 Tablet 0   • celecoxib (CELEBREX) 200 MG Cap Take 1 Capsule by mouth 1 time a day as needed for Moderate Pain (shoulder pain). 100 Capsule 1     No current facility-administered medications for this visit.   [4]  Patient Active Problem List  Diagnosis   • Hypertension   • Morbid obesity (HCC)   • S/p knee left replacement   • Allergic rhinitis   • Preventative health care   • Dyslipidemia   • Shoulder pain, right   • Low back pain   • Hepatic steatosis   • Renal cyst   • BPH (benign prostatic hypertrophy)   • Hypogonadism male   • Neck pain   • Knee pain, right   • Macular degeneration   • History of tobacco use   • Diabetes mellitus, type II (HCC)   • Leukocytosis   • Abnormal PSA   • Umbilical hernia   • Microscopic hematuria   • Sleep apnea risk   • S/p carotid left endarterectomy   • Aortic atherosclerosis (HCC)    URINE SYMPTOMS 100 Capsule 2    aspirin 81 MG EC tablet Take 1 Tablet by mouth every day. 100 Tablet 2    Semaglutide, 1 MG/DOSE, (OZEMPIC, 1 MG/DOSE,) 4 MG/3ML Solution Pen-injector Inject 1 mg under the skin every 7 days. 9 mL 3    atorvastatin (LIPITOR) 80 MG tablet Take 1 Tablet by mouth every evening. 100 Tablet 0    celecoxib (CELEBREX) 200 MG Cap Take 1 Capsule by mouth 1 time a day as needed for Moderate Pain (shoulder pain). 100 Capsule 1     No current facility-administered medications for this visit.   [2]   Social History  Tobacco Use    Smoking status: Former     Current packs/day: 0.00     Average packs/day: 1 pack/day for 15.0 years (15.0 ttl pk-yrs)     Types: Cigarettes     Start date: 1981     Quit date: 1996     Years since quittin.4     Passive exposure: Past    Smokeless tobacco: Never    Tobacco comments:     10 pack year history   Vaping Use    Vaping status: Never Used   Substance Use Topics    Alcohol use: No     Comment: none    Drug use: No   [3]   Current Outpatient Medications   Medication Sig Dispense Refill    clindamycin (CLEOCIN) 300 MG Cap Take 1 Capsule by mouth 3 times a day for 5 days. 15 Capsule 0    triamcinolone acetonide (KENALOG) 0.025 % Cream Apply 1 Application topically 2 times a day for 7 days. 80 g 0    valsartan-hydrochlorothiazide (DIOVAN-HCT) 160-25 MG per tablet Take 0.5 Tablets by mouth every day for 30 days. 15 Tablet 0    acetaminophen (TYLENOL) 500 MG Tab Take 1,000 mg by mouth every 6 hours as needed for Mild Pain.      Multiple Vitamins-Minerals (PRESERVISION AREDS PO) Take 1 Tablet by mouth 2 times a day.      Avacincaptad Pegol (IZERVAY) 2 MG/0.1ML Solution Administer 2 mg into affected eye(s) see administration instructions. Injection Every Two Months      tamsulosin (FLOMAX) 0.4 MG capsule TAKE 1 CAPSULE BY MOUTH EVERY DAY. INDICATIONS: URINE SYMPTOMS 100 Capsule 2    aspirin 81 MG EC tablet Take 1 Tablet by mouth every day. 100 Tablet 2     Semaglutide, 1 MG/DOSE, (OZEMPIC, 1 MG/DOSE,) 4 MG/3ML Solution Pen-injector Inject 1 mg under the skin every 7 days. 9 mL 3    atorvastatin (LIPITOR) 80 MG tablet Take 1 Tablet by mouth every evening. 100 Tablet 0    celecoxib (CELEBREX) 200 MG Cap Take 1 Capsule by mouth 1 time a day as needed for Moderate Pain (shoulder pain). 100 Capsule 1     No current facility-administered medications for this visit.   [4]   Patient Active Problem List  Diagnosis    Hypertension    Morbid obesity (HCC)    S/p knee left replacement    Allergic rhinitis    Preventative health care    Dyslipidemia    Shoulder pain, right    Low back pain    Hepatic steatosis    Renal cyst    BPH (benign prostatic hypertrophy)    Hypogonadism male    Neck pain    Knee pain, right    Macular degeneration    History of tobacco use    Diabetes mellitus, type II (HCC)    Leukocytosis    Abnormal PSA    Umbilical hernia    Microscopic hematuria    Sleep apnea risk    S/p carotid left endarterectomy    Aortic atherosclerosis (HCC)

## 2025-06-01 VITALS
HEART RATE: 84 BPM | SYSTOLIC BLOOD PRESSURE: 138 MMHG | WEIGHT: 289 LBS | TEMPERATURE: 98.5 F | BODY MASS INDEX: 43.8 KG/M2 | HEIGHT: 68 IN | OXYGEN SATURATION: 95 % | DIASTOLIC BLOOD PRESSURE: 72 MMHG

## 2025-06-03 ENCOUNTER — TELEPHONE (OUTPATIENT)
Dept: MEDICAL GROUP | Facility: MEDICAL CENTER | Age: 70
End: 2025-06-03
Payer: MEDICARE

## 2025-06-03 NOTE — TELEPHONE ENCOUNTER
Need PAR   (1) Mounjaro 2.5 mg / 0.5 mL oral pen injector, take 2.5 mg under the skin every 7 days  (2) 2 mL per 28 days  (3) diagnosis: Diabetes type 2  (4) ICD 10: E11.9

## 2025-06-04 ENCOUNTER — APPOINTMENT (OUTPATIENT)
Dept: PHYSICAL THERAPY | Facility: REHABILITATION | Age: 70
End: 2025-06-04
Attending: INTERNAL MEDICINE
Payer: MEDICARE

## 2025-06-06 ENCOUNTER — TELEPHONE (OUTPATIENT)
Dept: MEDICAL GROUP | Facility: MEDICAL CENTER | Age: 70
End: 2025-06-06
Payer: MEDICARE

## 2025-06-06 ENCOUNTER — APPOINTMENT (OUTPATIENT)
Dept: PHYSICAL THERAPY | Facility: REHABILITATION | Age: 70
End: 2025-06-06
Attending: INTERNAL MEDICINE
Payer: MEDICARE

## 2025-06-06 NOTE — TELEPHONE ENCOUNTER
VOICEMAIL  1. Caller Name: [facility] (prior Auth office)   Call Back Number: [phone]    2. Message: about the prior Auth for the Mounjaro medication    Please give them a call back   Reference number for the case is [id number]

## 2025-06-09 NOTE — PROGRESS NOTES
VASCULAR SURGERY                    Postop Note  _________________________________    6/10/2025    Mr. Davis is a 70 year old male who is here to follow-up after undergoing left carotid endarteretomy with bovine patch about 3 weeks ago. His left side revealed stenosis at 70-90%.  His right carotid is less than 50% stenosed. He had visual disturbance and dizziness prior to seeing Dr. Rosas.  This has resolved.  He is being treated for macular degeneration and has ocular injections done by ophthalmology.     Patient reports some throbbing on the left side of his neck and jaw although overall he is doing well.  He says he is progressing daily and is eager to get back on his tractor. He ambulates with a cane.    An another note, there was a potential allergic reaction from dressings which has resolved.     Vitals  There were no vitals taken for this visit.    Exam  Left neck incision healing well  Clean, dry and intact  No erythema or drainage      Diagnosis/Assessment:  s/p left carotid endarterectomy with bovine patch      Plan:      We have had a long discussion regarding the healing process and the next steps to come.  His throbbing should resolve.  He will have carotid duplex preformed in 6 months and then follow with Dr. Rosas.      We have reviewed his medications as well as diet and lifestyle modification.     The patient and his wife demonstrate a clear understanding of our discussion and have no further questions at this time. They will reach out with any questions or concerns via phone or My Chart.    Follow up:     6 months with MEENA Paul  [facility]  [phone]  [address], [place] [address]

## 2025-06-09 NOTE — TELEPHONE ENCOUNTER
Could you please call them to see what information they need from us regarding the Mounjaro prior authorization?

## 2025-06-10 ENCOUNTER — OFFICE VISIT (OUTPATIENT)
Facility: MEDICAL CENTER | Age: 70
End: 2025-06-10
Payer: MEDICARE

## 2025-06-10 VITALS
SYSTOLIC BLOOD PRESSURE: 136 MMHG | DIASTOLIC BLOOD PRESSURE: 72 MMHG | HEART RATE: 85 BPM | HEIGHT: 70 IN | BODY MASS INDEX: 41.66 KG/M2 | TEMPERATURE: 98.8 F | OXYGEN SATURATION: 96 % | WEIGHT: 291.01 LBS

## 2025-06-10 DIAGNOSIS — I65.22 CAROTID STENOSIS, ASYMPTOMATIC, LEFT: Primary | ICD-10-CM

## 2025-06-10 ASSESSMENT — FIBROSIS 4 INDEX: FIB4 SCORE: 1.632

## 2025-06-11 ENCOUNTER — TELEPHONE (OUTPATIENT)
Dept: MEDICAL GROUP | Facility: MEDICAL CENTER | Age: 70
End: 2025-06-11

## 2025-06-11 RX ORDER — SEMAGLUTIDE 1.34 MG/ML
1 INJECTION, SOLUTION SUBCUTANEOUS
Qty: 9 ML | Refills: 3 | Status: SHIPPED | OUTPATIENT
Start: 2025-06-11 | End: 2025-07-10

## 2025-06-11 RX ORDER — VALSARTAN 80 MG/1
80 TABLET ORAL 2 TIMES DAILY
Qty: 200 TABLET | Refills: 2 | Status: SHIPPED | OUTPATIENT
Start: 2025-06-11 | End: 2025-07-10

## 2025-06-23 RX ORDER — ATORVASTATIN CALCIUM 80 MG/1
80 TABLET, FILM COATED ORAL NIGHTLY
Qty: 100 TABLET | Refills: 2 | Status: SHIPPED | OUTPATIENT
Start: 2025-06-23 | End: 2026-07-28

## 2025-07-07 RX ORDER — ATORVASTATIN CALCIUM 80 MG/1
80 TABLET, FILM COATED ORAL NIGHTLY
Qty: 100 TABLET | Refills: 1 | Status: SHIPPED | OUTPATIENT
Start: 2025-07-07 | End: 2026-08-11

## 2025-07-07 NOTE — TELEPHONE ENCOUNTER
Received request via: Pharmacy    Was the patient seen in the last year in this department? Yes    Does the patient have an active prescription (recently filled or refills available) for medication(s) requested? No    [facility]     Does the patient have shelter Plus and need 100-day supply? (This applies to ALL medications) Yes, quantity updated to 100 days

## 2025-07-08 ENCOUNTER — HOSPITAL ENCOUNTER (OUTPATIENT)
Dept: LAB | Facility: MEDICAL CENTER | Age: 70
End: 2025-07-08
Attending: INTERNAL MEDICINE
Payer: MEDICARE

## 2025-07-08 DIAGNOSIS — R31.29 MICROSCOPIC HEMATURIA: ICD-10-CM

## 2025-07-08 DIAGNOSIS — R73.09 IMPAIRED GLUCOSE METABOLISM: ICD-10-CM

## 2025-07-08 DIAGNOSIS — I10 PRIMARY HYPERTENSION: Chronic | ICD-10-CM

## 2025-07-08 DIAGNOSIS — E78.5 DYSLIPIDEMIA: Chronic | ICD-10-CM

## 2025-07-08 LAB
APPEARANCE UR: CLEAR
BILIRUB UR QL STRIP.AUTO: NEGATIVE
CHOLEST SERPL-MCNC: 133 MG/DL (ref 100–199)
COLOR UR: YELLOW
CREAT UR-MCNC: 101 MG/DL
EST. AVERAGE GLUCOSE BLD GHB EST-MCNC: 123 MG/DL
GLUCOSE UR STRIP.AUTO-MCNC: NEGATIVE MG/DL
HBA1C MFR BLD: 5.9 % (ref 4–5.6)
HDLC SERPL-MCNC: 45 MG/DL
KETONES UR STRIP.AUTO-MCNC: NEGATIVE MG/DL
LDLC SERPL CALC-MCNC: 57 MG/DL
LEUKOCYTE ESTERASE UR QL STRIP.AUTO: NEGATIVE
MICRO URNS: NORMAL
MICROALBUMIN UR-MCNC: <1.2 MG/DL
MICROALBUMIN/CREAT UR: NORMAL MG/G (ref 0–30)
NITRITE UR QL STRIP.AUTO: NEGATIVE
PH UR STRIP.AUTO: 7.5 [PH] (ref 5–8)
PROT UR QL STRIP: NEGATIVE MG/DL
RBC UR QL AUTO: NEGATIVE
SP GR UR STRIP.AUTO: 1.02
TRIGL SERPL-MCNC: 153 MG/DL (ref 0–149)
UROBILINOGEN UR STRIP.AUTO-MCNC: 0.2 EU/DL

## 2025-07-08 PROCEDURE — 80061 LIPID PANEL: CPT

## 2025-07-08 PROCEDURE — 36415 COLL VENOUS BLD VENIPUNCTURE: CPT

## 2025-07-08 PROCEDURE — 82043 UR ALBUMIN QUANTITATIVE: CPT

## 2025-07-08 PROCEDURE — 83036 HEMOGLOBIN GLYCOSYLATED A1C: CPT

## 2025-07-08 PROCEDURE — 82570 ASSAY OF URINE CREATININE: CPT

## 2025-07-08 PROCEDURE — 81003 URINALYSIS AUTO W/O SCOPE: CPT

## 2025-07-10 ENCOUNTER — APPOINTMENT (OUTPATIENT)
Dept: MEDICAL GROUP | Facility: MEDICAL CENTER | Age: 70
End: 2025-07-10
Payer: MEDICARE

## 2025-07-10 VITALS
TEMPERATURE: 98.1 F | DIASTOLIC BLOOD PRESSURE: 62 MMHG | OXYGEN SATURATION: 95 % | HEART RATE: 75 BPM | SYSTOLIC BLOOD PRESSURE: 140 MMHG | WEIGHT: 295.7 LBS | HEIGHT: 68 IN | BODY MASS INDEX: 44.82 KG/M2

## 2025-07-10 DIAGNOSIS — E66.01 MORBID OBESITY (HCC): Chronic | ICD-10-CM

## 2025-07-10 DIAGNOSIS — E11.9 DIABETES MELLITUS WITHOUT COMPLICATION (HCC): ICD-10-CM

## 2025-07-10 DIAGNOSIS — Z12.11 COLON CANCER SCREENING: ICD-10-CM

## 2025-07-10 DIAGNOSIS — I10 PRIMARY HYPERTENSION: Primary | Chronic | ICD-10-CM

## 2025-07-10 DIAGNOSIS — Z00.00 PREVENTATIVE HEALTH CARE: Chronic | ICD-10-CM

## 2025-07-10 PROCEDURE — 3077F SYST BP >= 140 MM HG: CPT | Performed by: INTERNAL MEDICINE

## 2025-07-10 PROCEDURE — 3078F DIAST BP <80 MM HG: CPT | Performed by: INTERNAL MEDICINE

## 2025-07-10 PROCEDURE — 99214 OFFICE O/P EST MOD 30 MIN: CPT | Performed by: INTERNAL MEDICINE

## 2025-07-10 RX ORDER — TIRZEPATIDE 2.5 MG/.5ML
2.5 INJECTION, SOLUTION SUBCUTANEOUS
Qty: 2 ML | Refills: 1 | Status: SHIPPED | OUTPATIENT
Start: 2025-07-10

## 2025-07-10 RX ORDER — VALSARTAN 320 MG/1
320 TABLET ORAL DAILY
Qty: 100 TABLET | Refills: 2 | Status: SHIPPED | OUTPATIENT
Start: 2025-07-10 | End: 2025-10-18

## 2025-07-10 ASSESSMENT — FIBROSIS 4 INDEX: FIB4 SCORE: 1.632

## 2025-07-10 NOTE — PROGRESS NOTES
Subjective     Darci Davis is a 70 y.o. male who presents with Follow-Up and Other (Bp )            HPI      Here for follow up hypertension, patient is here with his wife, has been checking his blood pressures at home and sending me updates in MyChart, blood pressure still running higher on Diovan 80 mg twice daily, also on HCTZ 12.5 mg daily, blood pressure readings from June 27 through July 3 running 140s to 160/70-85, keeps active working in the yard, no exercise outside of that.  Patient has been on Ozempic for diabetes initial was A1c 6.4% but unable to tolerate doses higher than 1 mg of the Ozempic due to nausea, stopped the Ozempic and have been reluctant to start Mounjaro.  Dyslipidemia on Lipitor 80 mg, recent labs July 20 cholesterol 133, HDL 45, LDL 57.  Previously intolerant of Crestor and pravastatin.  No tobacco, no alcohol.  Patient is status post left carotid endarterectomy May 21, doing well postoperatively and continues on baby aspirin daily.  Also has not had a colonoscopy, did not complete Cologuard testing.  Tries to follow good diet, on Ozempic did have weight loss, noticed that the medication suppresses appetite.  In general he will try to limit sweets, candies, processed foods in his diet.  Has declined sleep apnea evaluation   Medications, allergies, medical history, surgical history, social history, family history  reviewed and updated        Current Medications[1]                           umbilical hernia  3/10/25  [place] surgical note symptomatic primary umbilical hernia, recommended robotic assisted laparoscopic repair with peritoneal placement of mesh, BMI 41, recommend BMI 38 or less or 25 pound weight loss to reduce risk of recurrence and operative complication, will allow 3 months to reach ideal body weight     Status post knee left replacement  6/08  right meniscus repair  5/11 MRI left knee medial meniscus tear, moderate chondral thinning,  chondromalacia  6/11 dr.sobiek bolanos left knee medial and lateral meniscectomy  7/12  left knee injection  12/12 now sees  ortho  1/13  ortho note, injection left knee  5/21/13  ortho note, injection left knee  3/31/15  ortho note; left knee injection steroid  5/29/15  ortho note knee arthritis,second injection euflexxa left knee  7/11/16  orthopedic operative note, left total knee arthroplasty  1/31/17  orthopedic note 6 months status post left total knee, x-ray left knee 3 view good component position and alignment, follow-up 6 months repeat x-rays  8/27/19  orthopedic note injury right knee, x-ray loss of medial joint space findings consistent with lateral meniscus tear, obtain MRI follow-up after imaging completed     s/p carotid left arterectomy  3/31/25 question amaurosis fugax left eye in january, saw  ophthalmology will obtain old records, echo, CT angiogram carotid  4/9/25 echo normal LV size, mild LVH, normal systolic function EF 63%, normal wall motion, grade 1 diastolic dysfunction, no evidence of right-to-left shunt on saline study, ascending aorta 3.8 cm  4/24/25 CT head and CTA neck with and without postprocessing, LICA 70 to 90% stenosis,GÓMEZ<50% stenosis, possible 1 to 2 mm right anterior communicating artery aneurysm versus infundibula, aberrant right subclavian takeoff, aortic atherosclerosis    5/6/25  vascular surgery consultation note severe left carotid stenosis, proceed with carotid endarterectomy  5/21/25  vascular surgery operative note left carotid endarterectomy  6/10/25 [facility] vascular surgery repeat ultraound carotid duplex six months    sleep apnea risk factors  3/31/25 stop bang risk, no snoring, no stopping breathing, occasional fatigue, does have hypertension, BMI greater than 35, age greater than 50, male, unknown exercise; at least stop bang risk 4; patient declines sleep apnea  evaluation     shoulder right pain  8/5/24 xray right shoulder arthritis   11/23/24 referral physical therapy  1/30/25 referral to [facility], increase celebrex from 100 mg qday to 200 mg qday     Renal cyst  12/9/13 ultrasound abdomen left renal 18 mm hypoechoic mass most likely cysts, MRI or CT to clarify  12/13/13 CT abd/pelvis with and without; 8 mm simple appearing cyst in the lower pole the left kidney which is discordant in size with the recent ultrasound. Followup ultrasound surveillance or MRI examination may be of value to further study the left kidney,11 mm in diameter simple cyst in the midpole region of the right kidney  11/17/14 ultrasound renal kidneys are poorly visualized due to poor sonographic penetration of the current exam,no focal renal cyst or mass is identified in the left kidney as seen on prior imaging studies at this time     Preventative health  10/9/14 zoster vaccine  5/28/15 tdap  6/5/19 prevnar  7/2/20 pneumovax   3/7/22 declines colonoscopy, cologuard ordered  10/17/24 flu   10/29/24 vit d 30  3/21/25 psa 4.0     obesity  7/2/13 declines bariatric eval  7/12/13 apnea link AHI 2, desaturation <89% for 27 min, total sleep time 6 hours 34 min, negative   12/2/13  referral bariatric surgery  10/9/14 BMI 44.3; referral to  weight loss bariatric  5/28/15 BMI 46.3 has seen  and declines bariatric surgery, trial orlistat  12/29/15 BMI 46.3   2/4/16 patient called, insurance does not cover bariatric surgery  6/16/16 BMI 45.2  9/16/16 BMI 42.8   3/6/17 BMI 44.3 insurance not cover bariatric, declines nutrition consultation; 20-30 minutes bike per day  12/26/17 BMI 45.3  7/2/20 BMI 45.89 referral weight management   1/19/21 BMI 43.0  3/7/22 weight 314 lb started ozempic   8/2/22 BMI 42.1 on semaglutide 0.5 mg weekly   1/16/23 has restarted ozempic 1 mg  5/29/24 BMI 42.12  [facility] start semaglutide 0.25 mg weekly  10/17/24 BMI 41 on semaglutide 0.5 mg,  increase to 1 mg  1/30/25 BMI 41 on ozempic 1 mg,increase to 2 mg  3/31/25 BMI 41.42 off ozempic 1 mg for 2 weeks nausea  5/30/25 BMI 43.55 weight 289 lb on ozempic 1 mg, change to mounjaro 2.5 mg  Unable to tolerate ozempic above 1 mg     neck pain  2/11/16 spine [place] note evaluation cervical pain, x-rays cervical spine from february 2016 showed lordosis C4-C6 and DJD, slight C4-C5 spondylolisthesis, will obtain cervical flexion and extension x-rays, continue naprosyn and robaxin as needed, start physical therapy, follow-up 6 weeks if no improvement consider MRI     microscopic hematuria  6/28/16 UA 2-5 RBC  7/5/16 UA negative blood  4/6/17 UA negative blood  6/7/19 UA negative blood  7/16/20 UA negative blood  3/16/22 UA negative blood  6/2/23 UA negative blood  10/29/24 UA negative blood  3/21/25 UA 6-10 RBC  7/8/25 UA negative blood     macular degeneration   5/17/21 dr.shieh cortés retina note 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, follow-up 3 weeks  8/25/22  retina note neovascular amd   1/16/23  eye exam neovascular amd   5/9/23  retina note follow up 6 months  1/2/24  [facility] eye exam macular degeneration   3/6/25  [facility] eye exam dry amd      low back pain  12/12 x-ray hip bilateral mild DJD  12/12 x-ray lumbar spine multilevel DJD and arthropathy, mild to moderate dextroconvex scoliosis     leukocytosis  9/23/08 wbc 12.0 (74%N,19.6%L)  5/15/09 wbc 9.1  3/11/11 wbc 10.9  12/10/12 wbc 9.1  10/10/14 wbc 10.8  1/4/16 wbc 10.3  6/28/16 wbc 13.1  7/12/16 wbc 15.1  4/6/17 wbc 9.6  12/27/17 wbc 10  6/7/19 wbc 11.6  7/16/20 wbc 9.3  3/16/22 wbc 10.9  6/2/23 wbc 7.1  5/25/24 wbc 9.5  10/29/24 wbc 12.3 (65.8%N,20.5%L)  3/21/25 wbc 12.3 (69%N,19%L), flow cytometry no abnormal myeloid, B-cell, T-cell, or NK population cells  5/16/25 wbc 12.4     knee pain right  8/27/19  orthopedic note injury right knee, x-ray loss of medial  joint space findings consistent with lateral meniscus tear, obtain MRI follow-up after imaging completed  10/1/19  orthopedic evaluation right knee pain, MRI right knee shows medial and patellofemoral degenerative joint changes options discussed conservative treatment versus right arthroscopy, patient elects to proceed with arthroscopy right knee  11/13/24 on celebrex as needed pain       hypogonadism  10/10/14 testosterone 201,free testosterone 49  1/6/16 testosterone 150,free testosterone 28  2/12/16 testosterone 179,prolactin 7,FSH 2.5,LH 1.0,SHBG 37  2/16/16 will try androgel 20.25 mg apply two per day  6/16/16 off androgel     Hypertension  9/08 urine mac 9, intolerant ace cough  Was on diovan hct 320/12.5 caused lightheadedness, taking half pill a day  10/14/11 on diovan 320/12.5 mg  11/3/11 change to diovan 320 mg, combo dose caused fatigue  12/6/12 change to diovan 160/25 mg daily  12/12 urine mac 4 on diovan hct 160/25 mg  12/2/13 urine mac <0.5 on diovan hct 160/25 mg  10/10/14 urine mac 6 on diovan 160/25 mg  6/3/15 urine mac <0.5 on diovan 160/25 mg  1/6/16 urine mac <0.5 on diovan hct 160/25 mg  4/6/17 urine mac <0.7 on diovan hct 160/25 mg  5/16/18 ultrasound carotid negative   7/16/20 urine mac<1.2 on diovan hct 160/25 mg   2/18/21 urine mac<1.2  on diovan hct 160/25 mg   3/16/22 urine mac<1.2 on diovan hct 160/25 mg   6/3/23 urine mac<1.2 on diovan hct 160/25 mg  5/28/24 bun 20,creat 0.88,GFR 92 on diovan hct 160-25 mg per  [facility]  10/29/24 urine mac<1.2 on diovan hct 160/25 mg  1/30/25 change diovan hct 160/25 mg to diovan 40 mg lower blood pressures with weight loss  3/21/25 urine mac 9 on diovan 40 mg  4/9/25 echo normal LV size, mild LVH, normal systolic function EF 63%, normal wall motion, grade 1 diastolic dysfunction, no evidence of right-to-left shunt on saline study, ascending aorta 3.8 cm  5/28/25 urgent care note elevated blood pressure on diovan 40 mg, changed to  diovan 80 mg and hctz 12.5 mg  6/9/25 home blood pressures 6/1/25 to 6/7/25, blood pressures running 131-160/70-80, increase diovan to 80 mg twice daily and continue hctz 12.5 mg daily  7/8/25 urine mac<1.2  increase to diovan 160 mg bid     Hepatic steatosis  12/9/13 AST 19,ALT 15  12/9/13 ultrasound abdomen echogenic inhomogenous liver nonspecific finding often found to represent steatosis  10/10/14 AST 17,ALT 15  6/3/15 AST 17,ALT 18  1/4/16 AST 14,ALT 19  2/12/16 AST 27,ALT 30  6/21/16 AST 14,ALT 16  4/6/17 AST 16,ALT 14  10/29/24 AST 11,ALT 9  10/29/24 LITTLE fibrosure; fibrosis score 0.22 (F0-F1), steatosis score 0.3 (S0), LITTLE score 0.0 (N0),AST 11,ALT 9,GGT 20  3/21/25 AST 16,ALT 11  5/16/25 AST 17,ALT 9     Dyslipidemia  9/08 chol 213, trig 119, hdl 49, ldl 140  5/09 chol 171,trig 118,hdl 51,ldl 96  9/10 hold pravachol  3/11 chol 247,trig 143,hdl 51,ldl 167   3/11 start lipitor samples  5/11 need to try crestor per insurance, trial of crestor 10 mg  10/14/11 on lipitor 20 mg insurance cleared  10/24/11 chol 203,trig 102,hdl 52,ldl 131 off med; start lipitor 20 mg  12/12 chol 167,trig 114,hdl 53,ldl 91 on lipitor 20 mg  12/2/13 chol 199,trig 167,hdl 52,ldl 114 on lipitor 20 mg  10/10/14 chol 198,trig 140,hdl 55,ldl 115 on lipitor 20 mg  6/3/15 chol 162,trig 159,hdl 50,ldl 80 on lipitor 20 mg  12/29/15 off lipitor  1/6/16 chol 233,trig 147,hdl 52,ldl 142 off lipitor  1/7/16 start pravachol 20 mg  2/12/16 chol 190,trig 134,hdl 46,ldl 117 on pravachol 20 mg  6/21/16 chol 184,trig 131,hdl 54,ldl 104 on pravachol 20 mg  4/6/17 chol 190,trig 147,hdl 51,ldl 110 on pravachol 20 mg  12/27/17 chol 215,trig 201,hdl 53,ldl 122 on pravachol 20 mg  6/7/19 chol 200,trig 195,hdl 50,ldl 111 on pravachol 20 mg  7/16/20 chol 203,trig 115,hdl 55,ldl 125 on pravachol 20 mg  7/17/20 change to crestor 10 mg  1/19/21 back on pravachol 20 mg (intolerant to crestor)  2/18/21 chol 211,trig 175,hdl 47,ldl 129 on pravastatin 20  mg  2/23/21 change to lipitor 20 mg and stop pravastatin  3/16/22 chol 189,trig 193,hdl 49,ldl 101 on lipitor 20 mg  6/3/23 chol 246,trig 140,hdl 51,ldl 167 on lipitor 20 mg  5/25/24 chol 182,trig 118,hdl 51,ldl 107 on lipitor 20 mg  10/29/24 chol 175,trig 108,hdl 54,ldl 99 on lipitor 20 mg  3/21/25 chol 170,trig 178,hdl 46,ldl 88,lipoprotein a 118 on lipitor 20 mg  3/31/25 increase lipitor to 80 mg  4/30/25 chol 152,treig 141,hdl 43,ldl 81,apo b 85 on lipitor 80 mg for only one week will repeat end of june 7/8/25 chol 133,trig 153,hdl 45,ldl 57 on lipitor 80 mg  (intolerant crestor, pravastatin not as effective)     diabetes  10/10/14 bs 114  6/3/15 A1c 5.9%,bs 94  1/6/16 bs 104,A1c 6.1%  6/21/16 A1c 5.8%  4/6/17 bs 104  12/27/17 A1c 5.8%  6/7/19 A1c 6%  7/16/20 A1c 6.4%  7/17/20 start metformin  mg, order for freestyle lite meter and strips to pharmacy, patient will bring here for instruction  1/19/21 off metformin   2/18/21 A1c 6.4% no meds  5/17/21   retina note 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, follow-up 3 weeks  3/16/22 A1c 6.1%  3/7/22 started ozempic 0.25 mg weekly  4/19/22 increase to ozempic 0.5 mg weekly  1/2/24  [facility] eye exam macular degeneration   5/28/24 A1c 6.1% on ozempic 0.5 mg per  [facility]   10/17/24 increase ozempic to 1 mg weekly  10/29/24 A1c 5.7% on ozempic 1 mg weekly  1/30/25 increase ozempic to 2 mg   2/9/25 patient prefers to stay on the ozempic 1 mg dose  3/21/25 A1c 5.8% on ozempic 1 mg  4/1/25 off ozempic 1 mg x 2 weeks nausea, will restart  7/8/25 A1c 5.9%  Intolerant metformin      BPH  5/28/15 start Flomax  12/29/15 start cialis 5 mg daily   4/6/17 psa 2.8  6/7/19 psa 2.1  7/2/20 could not tolerate flomax, trial doxazosin 2 mg  7/16/20 psa 2.3  3/7/22 off doxasozin   3/16/22 psa 2.9  6/3/23 psa 3.7  5/29/24  [facility] resume flomax 0.4 mg  10/29/24 psa 4.5 on flomax   3/21/25 psa 4.0 on flomax    "  Aortic atherosclerosis  4/24/25 CT head and CTA neck with and without postprocessing, aortic atherosclerosis                        Problem List[2]                 Patient Care Team:  Phil Kohler M.D. as PCP - General (Internal Medicine)  Grecia Mena as Patient Advocate (Pharmacy)    ROS           Objective     BP (!) 140/62   Pulse 75   Temp 36.7 °C (98.1 °F) (Temporal)   Ht 1.735 m (5' 8.31\")   Wt (!) 134 kg (295 lb 11.2 oz)   SpO2 95%   BMI 44.56 kg/m²      Physical Exam  Vitals and nursing note reviewed.   Constitutional:       Appearance: Normal appearance.   HENT:      Head: Normocephalic and atraumatic.      Right Ear: External ear normal.      Left Ear: External ear normal.      Nose: Nose normal.   Eyes:      Conjunctiva/sclera: Conjunctivae normal.   Cardiovascular:      Rate and Rhythm: Normal rate and regular rhythm.   Pulmonary:      Effort: Pulmonary effort is normal. No respiratory distress.      Breath sounds: Normal breath sounds.   Abdominal:      General: There is no distension.   Musculoskeletal:         General: No deformity.      Cervical back: Neck supple.   Skin:     Coloration: Skin is not jaundiced.      Findings: No bruising.   Neurological:      Mental Status: He is alert.   Psychiatric:         Mood and Affect: Mood normal.         Behavior: Behavior normal.                                  Assessment & Plan         Assessment  #1 hypertension blood pressure not ideally controlled on Diovan 80 mg twice a day, hydrochlorothiazide 12.5 mg    #2 dyslipidemia 7/8/25 chol 133,trig 153,hdl 45,ldl 57 on lipitor 80 mg    #3 diabetes mellitus when off Ozempic, A1c prior to initiation of Ozempic A1c was 6.4%    #4 status post left-sided carotid endarterectomy 5/21/25    #5 BMI 44.56, morbid obesity    #6 risk factors for sleep apnea, declined sleep evaluation    Plan  #1 increased Diovan to 320 mg once daily, check blood pressures daily, ensure adequate water intake, monitor " for lightheadedness or dizziness    #2 continue Lipitor 80 mg, reviewed lab results from 7/8/25 with him    #3 change from Ozempic to Mounjaro since he could not tolerate higher doses of Ozempic, start Mounjaro 2.5 mg every 7 days, medication can cause nausea, bloating, abdominal pain, reflux, loose stools, constipation, send me an update after 2 to 3 weeks on the starting dose, also send me an update on his weight and blood pressures as once he can lose weight we might be able to decrease the Diovan or hydrochlorothiazide and titrate upwards every 4 weeks as tolerated    #4 continue work on a good nutrition program try to get regular exercise outside of working in his yard on the tractor    #5 declines colonoscopy, will order Cologuard    #6 follow-up 3 months                 [1]   Current Outpatient Medications   Medication Sig Dispense Refill    atorvastatin (LIPITOR) 80 MG tablet Take 1 Tablet by mouth every evening. 100 Tablet 1    Semaglutide, 1 MG/DOSE, (OZEMPIC, 1 MG/DOSE,) 4 MG/3ML Solution Pen-injector Inject 1 mg under the skin every 7 days. 9 mL 3    valsartan (DIOVAN) 80 MG Tab Take 1 Tablet by mouth 2 times a day. 200 Tablet 2    hydrochlorothiazide (MICROZIDE) 12.5 MG capsule Take 1 Capsule by mouth every day. 100 Capsule 1    valsartan (DIOVAN) 40 MG Tab Take 2 Tablets by mouth every day. 200 Tablet 1    clobetasol (TEMOVATE) 0.05 % Cream Apply 1 Application topically 2 times a day as needed (neck rash). 60 g 1    acetaminophen (TYLENOL) 500 MG Tab Take 1,000 mg by mouth every 6 hours as needed for Mild Pain.      Multiple Vitamins-Minerals (PRESERVISION AREDS PO) Take 1 Tablet by mouth 2 times a day.      Avacincaptad Pegol (IZERVAY) 2 MG/0.1ML Solution Administer 2 mg into affected eye(s) see administration instructions. Injection Every Two Months      tamsulosin (FLOMAX) 0.4 MG capsule TAKE 1 CAPSULE BY MOUTH EVERY DAY. INDICATIONS: URINE SYMPTOMS 100 Capsule 2    aspirin 81 MG EC tablet Take 1  Tablet by mouth every day. 100 Tablet 2    celecoxib (CELEBREX) 200 MG Cap Take 1 Capsule by mouth 1 time a day as needed for Moderate Pain (shoulder pain). 100 Capsule 1     No current facility-administered medications for this visit.   [2]   Patient Active Problem List  Diagnosis    Hypertension    Morbid obesity (HCC)    S/p knee left replacement    Allergic rhinitis    Preventative health care    Dyslipidemia    Shoulder pain, right    Low back pain    Hepatic steatosis    Renal cyst    BPH (benign prostatic hypertrophy)    Hypogonadism male    Neck pain    Knee pain, right    Macular degeneration    History of tobacco use    Diabetes mellitus, type II (HCC)    Leukocytosis    Abnormal PSA    Umbilical hernia    Microscopic hematuria    Sleep apnea risk    S/p carotid left endarterectomy    Aortic atherosclerosis (HCC)

## 2025-07-15 ENCOUNTER — TELEPHONE (OUTPATIENT)
Dept: HEALTH INFORMATION MANAGEMENT | Facility: OTHER | Age: 70
End: 2025-07-15
Payer: MEDICARE

## 2025-09-12 ENCOUNTER — APPOINTMENT (OUTPATIENT)
Dept: LAB | Facility: MEDICAL CENTER | Age: 70
End: 2025-09-12
Payer: MEDICARE

## (undated) DEVICE — GLOVE SZ 8 BIOGEL PI MICRO - PF LF (50PR/BX)

## (undated) DEVICE — VESSELOOP MAXI BLUE STERILE- SURG-I-LOOP (10EA/BX)

## (undated) DEVICE — MEDICINE CUP STERILE 2 OZ (100/CA)

## (undated) DEVICE — SUTURE GENERAL

## (undated) DEVICE — Device

## (undated) DEVICE — DRESSING TRANSPARENT FILM TEGADERM 4 X 4.75" (50EA/BX)"

## (undated) DEVICE — SOD. CHL. INJ. 0.9% 1000 ML - (14EA/CA 60CA/PF)

## (undated) DEVICE — SENSOR OXIMETER ADULT SPO2 RD SET (20EA/BX)

## (undated) DEVICE — KIT RADIAL ARTERY 20GA W/MAX BARRIER AND BIOPATCH (5EA/CA) #10740 IS FOR THE SET RADIAL ARTERIAL

## (undated) DEVICE — GOWN SURGEONS LARGE - (32/CA)

## (undated) DEVICE — SUTURE 5-0 PROLENE BLUE C-1 HS 1 X 30 (36EA/BX)"

## (undated) DEVICE — GOWN WARMING STANDARD FLEX - (30/CA)

## (undated) DEVICE — WIPE SURGICAL INSTRUMENT VISIWIPE 2-7/8IN X 2-7/8IN (20EA/PK)

## (undated) DEVICE — SET LEADWIRE 5 LEAD BEDSIDE DISPOSABLE ECG (1SET OF 5/EA)

## (undated) DEVICE — STAPLER SKIN DISP - (6/BX 10BX/CA) VISISTAT

## (undated) DEVICE — GLOVE BIOGEL PI ORTHO SZ 6 SURGICAL PF LF (40PR/BX)

## (undated) DEVICE — DRAPE MAGNETIC (INSTRA-MAG) - (30/CA)

## (undated) DEVICE — TOWELS CLOTH SURGICAL - (4/PK 20PK/CA)

## (undated) DEVICE — CHLORAPREP 26 ML APPLICATOR - ORANGE TINT(25/CA)

## (undated) DEVICE — SUTURE 2-0 ETHILON FS - (36/BX) 18 INCH

## (undated) DEVICE — KIT SURGIFLO W/OUT THROMBIN - (6EA/BX)

## (undated) DEVICE — PACK AV FISTULA (2EA/CA)

## (undated) DEVICE — SUTURE 2-0 VICRYL PLUS CT-1 - 8 X 18 INCH(12/BX)

## (undated) DEVICE — DRAPE SURGICAL U 77X120 - (10/CA)

## (undated) DEVICE — ELECTRODE DUAL RETURN W/ CORD - (50/PK)

## (undated) DEVICE — DRAIN J-VAC 7MM FLAT - (10EA/CA)

## (undated) DEVICE — SUTURE CV

## (undated) DEVICE — LACTATED RINGERS INJ 1000 ML - (14EA/CA 60CA/PF)

## (undated) DEVICE — COVER LIGHT HANDLE ALC PLUS DISP (18EA/BX)

## (undated) DEVICE — SODIUM CHL IRRIGATION 0.9% 1000ML (12EA/CA)

## (undated) DEVICE — SET EXTENSION WITH 2 PORTS (48EA/CA) ***PART #2C8610 IS A SUBSTITUTE*****

## (undated) DEVICE — SYRINGE 30 ML LL (56/BX)

## (undated) DEVICE — SUCTION INSTRUMENT YANKAUER BULBOUS TIP W/O VENT (50EA/CA)

## (undated) DEVICE — SLEEVE VASO DVT COMPRESSION CALF MED - (10PR/CA)

## (undated) DEVICE — TUBING CLEARLINK DUO-VENT - C-FLO (48EA/CA)

## (undated) DEVICE — DECANTER FLD BLS - (50/CA)

## (undated) DEVICE — TUBE E-T HI-LO CUFF 7.0MM (10EA/PK)

## (undated) DEVICE — POLY UMBILICAL TAPE 1/8X30 - (36/BX)

## (undated) DEVICE — RESERVOIR SUCTION 100 CC - SILICONE (20EA/CA)

## (undated) DEVICE — MAT PATIENT POSITIONING PREVALON (10EA/CA)

## (undated) DEVICE — SUTURE 4-0 30CM STRATAFIX SPIRAL PS-2 (12EA/BX)

## (undated) DEVICE — INTRAOP NEURO IN OR 1:1 PER 15 MIN

## (undated) DEVICE — SUTURE 3-0 SILK 12 X 18 IN - (36/BX)

## (undated) DEVICE — SPONGE GAUZESTER 4 X 4 4PLY - (128PK/CA)

## (undated) DEVICE — SODIUM CHL. INJ. 0.9% 500ML (24EA/CA 50CA/PF)

## (undated) DEVICE — CANISTER SUCTION 3000ML MECHANICAL FILTER AUTO SHUTOFF MEDI-VAC NONSTERILE LF DISP (40EA/CA)